# Patient Record
Sex: FEMALE | Race: BLACK OR AFRICAN AMERICAN | NOT HISPANIC OR LATINO | Employment: UNEMPLOYED | RURAL
[De-identification: names, ages, dates, MRNs, and addresses within clinical notes are randomized per-mention and may not be internally consistent; named-entity substitution may affect disease eponyms.]

---

## 2021-12-31 ENCOUNTER — HOSPITAL ENCOUNTER (INPATIENT)
Facility: HOSPITAL | Age: 41
LOS: 8 days | Discharge: ANOTHER HOSPITAL | DRG: 463 | End: 2022-01-08
Attending: FAMILY MEDICINE | Admitting: INTERNAL MEDICINE

## 2021-12-31 DIAGNOSIS — L03.90 CELLULITIS: ICD-10-CM

## 2021-12-31 DIAGNOSIS — I10 HYPERTENSION: ICD-10-CM

## 2021-12-31 DIAGNOSIS — R57.9 SHOCK: ICD-10-CM

## 2021-12-31 DIAGNOSIS — B19.20 HEPATITIS C: ICD-10-CM

## 2021-12-31 DIAGNOSIS — U07.1 COVID-19: ICD-10-CM

## 2021-12-31 DIAGNOSIS — G93.41 ENCEPHALOPATHY, METABOLIC: ICD-10-CM

## 2021-12-31 DIAGNOSIS — L02.419 CELLULITIS AND ABSCESS OF LEG: Primary | ICD-10-CM

## 2021-12-31 DIAGNOSIS — D62 ACUTE BLOOD LOSS ANEMIA: ICD-10-CM

## 2021-12-31 DIAGNOSIS — I10 PRIMARY HYPERTENSION: ICD-10-CM

## 2021-12-31 DIAGNOSIS — N17.9 AKI (ACUTE KIDNEY INJURY): ICD-10-CM

## 2021-12-31 DIAGNOSIS — L03.119 CELLULITIS AND ABSCESS OF LEG: Primary | ICD-10-CM

## 2021-12-31 DIAGNOSIS — A41.9 SEPSIS, DUE TO UNSPECIFIED ORGANISM, UNSPECIFIED WHETHER ACUTE ORGAN DYSFUNCTION PRESENT: ICD-10-CM

## 2021-12-31 DIAGNOSIS — M72.6 NECROTIZING FASCIITIS: ICD-10-CM

## 2021-12-31 LAB
ALBUMIN SERPL BCP-MCNC: 2.7 G/DL (ref 3.5–5)
ALBUMIN/GLOB SERPL: 0.7 {RATIO}
ALP SERPL-CCNC: 104 U/L (ref 37–98)
ALT SERPL W P-5'-P-CCNC: 25 U/L (ref 13–56)
AMORPH PHOS CRY #/AREA URNS LPF: ABNORMAL /LPF
ANION GAP SERPL CALCULATED.3IONS-SCNC: 21 MMOL/L (ref 7–16)
ANISOCYTOSIS BLD QL SMEAR: ABNORMAL
AST SERPL W P-5'-P-CCNC: 31 U/L (ref 15–37)
B-HCG UR QL: NEGATIVE
BACTERIA #/AREA URNS HPF: ABNORMAL /HPF
BASOPHILS # BLD AUTO: 0.01 K/UL (ref 0–0.2)
BASOPHILS NFR BLD AUTO: 0.2 % (ref 0–1)
BILIRUB SERPL-MCNC: 1.8 MG/DL (ref 0–1.2)
BILIRUB UR QL STRIP: ABNORMAL
BUN SERPL-MCNC: 18 MG/DL (ref 7–18)
BUN/CREAT SERPL: 6 (ref 6–20)
CALCIUM SERPL-MCNC: 8.6 MG/DL (ref 8.5–10.1)
CHLORIDE SERPL-SCNC: 98 MMOL/L (ref 98–107)
CLARITY UR: ABNORMAL
CO2 SERPL-SCNC: 19 MMOL/L (ref 21–32)
COLOR UR: ABNORMAL
CREAT SERPL-MCNC: 2.84 MG/DL (ref 0.55–1.02)
CRENATED CELLS: ABNORMAL
CTP QC/QA: YES
DIFFERENTIAL METHOD BLD: ABNORMAL
EOSINOPHIL # BLD AUTO: 0 K/UL (ref 0–0.5)
EOSINOPHIL NFR BLD AUTO: 0 % (ref 1–4)
ERYTHROCYTE [DISTWIDTH] IN BLOOD BY AUTOMATED COUNT: 17 % (ref 11.5–14.5)
FINE GRAN CASTS #/AREA URNS LPF: ABNORMAL /LPF
GLOBULIN SER-MCNC: 4 G/DL (ref 2–4)
GLUCOSE SERPL-MCNC: 129 MG/DL (ref 74–106)
GLUCOSE UR STRIP-MCNC: NEGATIVE MG/DL
HCT VFR BLD AUTO: 44.6 % (ref 38–47)
HGB BLD-MCNC: 14.8 G/DL (ref 12–16)
IMM GRANULOCYTES # BLD AUTO: 0.07 K/UL (ref 0–0.04)
IMM GRANULOCYTES NFR BLD: 1.2 % (ref 0–0.4)
KETONES UR STRIP-SCNC: 15 MG/DL
LACTATE SERPL-SCNC: 1.8 MMOL/L (ref 0.4–2)
LACTATE SERPL-SCNC: 3.2 MMOL/L (ref 0.4–2)
LEUKOCYTE ESTERASE UR QL STRIP: NEGATIVE
LYMPHOCYTES # BLD AUTO: 0.4 K/UL (ref 1–4.8)
LYMPHOCYTES NFR BLD AUTO: 6.8 % (ref 27–41)
LYMPHOCYTES NFR BLD MANUAL: 11 % (ref 27–41)
MCH RBC QN AUTO: 26.5 PG (ref 27–31)
MCHC RBC AUTO-ENTMCNC: 33.2 G/DL (ref 32–36)
MCV RBC AUTO: 79.8 FL (ref 80–96)
MONOCYTES # BLD AUTO: 0.81 K/UL (ref 0–0.8)
MONOCYTES NFR BLD AUTO: 13.9 % (ref 2–6)
MONOCYTES NFR BLD MANUAL: 14 % (ref 2–6)
MPC BLD CALC-MCNC: 12.7 FL (ref 9.4–12.4)
MUCOUS THREADS #/AREA URNS HPF: ABNORMAL /HPF
MYELOCYTES NFR BLD MANUAL: 1 %
NEUTROPHILS # BLD AUTO: 4.55 K/UL (ref 1.8–7.7)
NEUTROPHILS NFR BLD AUTO: 77.9 % (ref 53–65)
NEUTS BAND NFR BLD MANUAL: 32 % (ref 1–5)
NEUTS SEG NFR BLD MANUAL: 42 % (ref 50–62)
NITRITE UR QL STRIP: NEGATIVE
NRBC # BLD AUTO: 0 X10E3/UL
NRBC, AUTO (.00): 0 %
OVALOCYTES BLD QL SMEAR: ABNORMAL
PH UR STRIP: 5 PH UNITS
PLATELET # BLD AUTO: 169 K/UL (ref 150–400)
PLATELET MORPHOLOGY: ABNORMAL
POLYCHROMASIA BLD QL SMEAR: ABNORMAL
POTASSIUM SERPL-SCNC: 4 MMOL/L (ref 3.5–5.1)
PROT SERPL-MCNC: 6.7 G/DL (ref 6.4–8.2)
PROT UR QL STRIP: 100
RBC # BLD AUTO: 5.59 M/UL (ref 4.2–5.4)
RBC # UR STRIP: ABNORMAL /UL
RBC #/AREA URNS HPF: ABNORMAL /HPF
SARS-COV-2 RDRP RESP QL NAA+PROBE: POSITIVE
SODIUM SERPL-SCNC: 134 MMOL/L (ref 136–145)
SP GR UR STRIP: >=1.03
SQUAMOUS #/AREA URNS LPF: ABNORMAL /LPF
TRICHOMONAS #/AREA URNS HPF: ABNORMAL /HPF
UROBILINOGEN UR STRIP-ACNC: 4 MG/DL
WBC # BLD AUTO: 5.84 K/UL (ref 4.5–11)
WBC #/AREA URNS HPF: ABNORMAL /HPF
YEAST #/AREA URNS HPF: ABNORMAL /HPF

## 2021-12-31 PROCEDURE — 81025 URINE PREGNANCY TEST: CPT | Performed by: FAMILY MEDICINE

## 2021-12-31 PROCEDURE — 96366 THER/PROPH/DIAG IV INF ADDON: CPT

## 2021-12-31 PROCEDURE — 85025 COMPLETE CBC W/AUTO DIFF WBC: CPT | Performed by: FAMILY MEDICINE

## 2021-12-31 PROCEDURE — 63600175 PHARM REV CODE 636 W HCPCS: Performed by: HOSPITALIST

## 2021-12-31 PROCEDURE — 25000003 PHARM REV CODE 250: Performed by: FAMILY MEDICINE

## 2021-12-31 PROCEDURE — 96365 THER/PROPH/DIAG IV INF INIT: CPT

## 2021-12-31 PROCEDURE — 99222 PR INITIAL HOSPITAL CARE,LEVL II: ICD-10-PCS | Mod: ,,, | Performed by: HOSPITALIST

## 2021-12-31 PROCEDURE — 87635 SARS-COV-2 COVID-19 AMP PRB: CPT | Performed by: FAMILY MEDICINE

## 2021-12-31 PROCEDURE — 11000001 HC ACUTE MED/SURG PRIVATE ROOM

## 2021-12-31 PROCEDURE — 99285 EMERGENCY DEPT VISIT HI MDM: CPT | Mod: 25

## 2021-12-31 PROCEDURE — 25000003 PHARM REV CODE 250: Performed by: HOSPITALIST

## 2021-12-31 PROCEDURE — 81001 URINALYSIS AUTO W/SCOPE: CPT | Performed by: FAMILY MEDICINE

## 2021-12-31 PROCEDURE — 99284 PR EMERGENCY DEPT VISIT,LEVEL IV: ICD-10-PCS | Mod: ,,, | Performed by: FAMILY MEDICINE

## 2021-12-31 PROCEDURE — 80053 COMPREHEN METABOLIC PANEL: CPT | Performed by: FAMILY MEDICINE

## 2021-12-31 PROCEDURE — 63600175 PHARM REV CODE 636 W HCPCS: Performed by: FAMILY MEDICINE

## 2021-12-31 PROCEDURE — 87040 BLOOD CULTURE FOR BACTERIA: CPT | Performed by: FAMILY MEDICINE

## 2021-12-31 PROCEDURE — 81003 URINALYSIS AUTO W/O SCOPE: CPT | Performed by: FAMILY MEDICINE

## 2021-12-31 PROCEDURE — 96361 HYDRATE IV INFUSION ADD-ON: CPT

## 2021-12-31 PROCEDURE — 36415 COLL VENOUS BLD VENIPUNCTURE: CPT | Performed by: FAMILY MEDICINE

## 2021-12-31 PROCEDURE — 99284 EMERGENCY DEPT VISIT MOD MDM: CPT | Mod: ,,, | Performed by: FAMILY MEDICINE

## 2021-12-31 PROCEDURE — 83605 ASSAY OF LACTIC ACID: CPT | Performed by: FAMILY MEDICINE

## 2021-12-31 PROCEDURE — 99222 1ST HOSP IP/OBS MODERATE 55: CPT | Mod: ,,, | Performed by: HOSPITALIST

## 2021-12-31 RX ORDER — TRAZODONE HYDROCHLORIDE 50 MG/1
50 TABLET ORAL NIGHTLY PRN
Status: DISCONTINUED | OUTPATIENT
Start: 2021-12-31 | End: 2022-01-03

## 2021-12-31 RX ORDER — BISACODYL 5 MG
10 TABLET, DELAYED RELEASE (ENTERIC COATED) ORAL DAILY PRN
Status: DISCONTINUED | OUTPATIENT
Start: 2021-12-31 | End: 2022-01-08 | Stop reason: HOSPADM

## 2021-12-31 RX ORDER — ONDANSETRON 2 MG/ML
8 INJECTION INTRAMUSCULAR; INTRAVENOUS EVERY 6 HOURS PRN
Status: DISCONTINUED | OUTPATIENT
Start: 2021-12-31 | End: 2022-01-08 | Stop reason: HOSPADM

## 2021-12-31 RX ORDER — ENOXAPARIN SODIUM 100 MG/ML
40 INJECTION SUBCUTANEOUS EVERY 24 HOURS
Status: DISCONTINUED | OUTPATIENT
Start: 2021-12-31 | End: 2022-01-01

## 2021-12-31 RX ORDER — ONDANSETRON 2 MG/ML
4 INJECTION INTRAMUSCULAR; INTRAVENOUS
Status: COMPLETED | OUTPATIENT
Start: 2021-12-31 | End: 2021-12-31

## 2021-12-31 RX ORDER — OXYCODONE HYDROCHLORIDE 5 MG/1
5 TABLET ORAL EVERY 4 HOURS PRN
Status: DISCONTINUED | OUTPATIENT
Start: 2021-12-31 | End: 2022-01-08 | Stop reason: HOSPADM

## 2021-12-31 RX ORDER — SIMETHICONE 80 MG
1 TABLET,CHEWABLE ORAL 3 TIMES DAILY PRN
Status: DISCONTINUED | OUTPATIENT
Start: 2021-12-31 | End: 2022-01-08 | Stop reason: HOSPADM

## 2021-12-31 RX ORDER — MORPHINE SULFATE 4 MG/ML
4 INJECTION, SOLUTION INTRAMUSCULAR; INTRAVENOUS
Status: COMPLETED | OUTPATIENT
Start: 2021-12-31 | End: 2021-12-31

## 2021-12-31 RX ORDER — SODIUM CHLORIDE 9 MG/ML
INJECTION, SOLUTION INTRAVENOUS CONTINUOUS
Status: DISCONTINUED | OUTPATIENT
Start: 2021-12-31 | End: 2022-01-02

## 2021-12-31 RX ORDER — ACETAMINOPHEN 500 MG
1000 TABLET ORAL EVERY 6 HOURS PRN
Status: DISCONTINUED | OUTPATIENT
Start: 2021-12-31 | End: 2022-01-06

## 2021-12-31 RX ORDER — GUAIFENESIN/DEXTROMETHORPHAN 100-10MG/5
10 SYRUP ORAL EVERY 6 HOURS PRN
Status: DISCONTINUED | OUTPATIENT
Start: 2021-12-31 | End: 2022-01-08 | Stop reason: HOSPADM

## 2021-12-31 RX ORDER — SODIUM CHLORIDE 9 MG/ML
INJECTION, SOLUTION INTRAVENOUS
Status: COMPLETED | OUTPATIENT
Start: 2021-12-31 | End: 2021-12-31

## 2021-12-31 RX ORDER — HYDRALAZINE HYDROCHLORIDE 20 MG/ML
10 INJECTION INTRAMUSCULAR; INTRAVENOUS
Status: COMPLETED | OUTPATIENT
Start: 2021-12-31 | End: 2021-12-31

## 2021-12-31 RX ADMIN — ENOXAPARIN SODIUM 40 MG: 40 INJECTION SUBCUTANEOUS at 10:12

## 2021-12-31 RX ADMIN — PIPERACILLIN AND TAZOBACTAM 4.5 G: 4; .5 INJECTION, POWDER, LYOPHILIZED, FOR SOLUTION INTRAVENOUS; PARENTERAL at 10:12

## 2021-12-31 RX ADMIN — MORPHINE SULFATE 4 MG: 4 INJECTION INTRAVENOUS at 07:12

## 2021-12-31 RX ADMIN — SODIUM CHLORIDE 1000 ML/HR: 9 INJECTION, SOLUTION INTRAVENOUS at 07:12

## 2021-12-31 RX ADMIN — OXYCODONE HYDROCHLORIDE 5 MG: 5 TABLET ORAL at 10:12

## 2021-12-31 RX ADMIN — VANCOMYCIN HYDROCHLORIDE 1500 MG: 5 INJECTION, POWDER, LYOPHILIZED, FOR SOLUTION INTRAVENOUS at 07:12

## 2021-12-31 RX ADMIN — ONDANSETRON 4 MG: 2 INJECTION INTRAMUSCULAR; INTRAVENOUS at 07:12

## 2021-12-31 RX ADMIN — SODIUM CHLORIDE 100 ML/HR: 9 INJECTION, SOLUTION INTRAVENOUS at 10:12

## 2021-12-31 RX ADMIN — HYDRALAZINE HYDROCHLORIDE 10 MG: 20 INJECTION INTRAMUSCULAR; INTRAVENOUS at 09:12

## 2021-12-31 NOTE — ED TRIAGE NOTES
Presents to ED c/o pain, swelling, redness and warmth noted to left groin that has been ongoing x3-4 days.

## 2022-01-01 ENCOUNTER — ANESTHESIA (OUTPATIENT)
Dept: SURGERY | Facility: HOSPITAL | Age: 42
DRG: 463 | End: 2022-01-01

## 2022-01-01 ENCOUNTER — ANESTHESIA EVENT (OUTPATIENT)
Dept: SURGERY | Facility: HOSPITAL | Age: 42
DRG: 463 | End: 2022-01-01

## 2022-01-01 PROBLEM — L03.90 CELLULITIS: Status: ACTIVE | Noted: 2022-01-01

## 2022-01-01 PROBLEM — U07.1 COVID-19: Status: ACTIVE | Noted: 2022-01-01

## 2022-01-01 LAB
ANION GAP SERPL CALCULATED.3IONS-SCNC: 21 MMOL/L (ref 7–16)
ANISOCYTOSIS BLD QL SMEAR: ABNORMAL
APTT PPP: 38.8 SECONDS (ref 25.2–37.3)
BASOPHILS # BLD AUTO: 0.05 K/UL (ref 0–0.2)
BASOPHILS NFR BLD AUTO: 0.7 % (ref 0–1)
BUN SERPL-MCNC: 28 MG/DL (ref 7–18)
BUN/CREAT SERPL: 8 (ref 6–20)
CALCIUM SERPL-MCNC: 8 MG/DL (ref 8.5–10.1)
CHLORIDE SERPL-SCNC: 96 MMOL/L (ref 98–107)
CO2 SERPL-SCNC: 16 MMOL/L (ref 21–32)
CREAT SERPL-MCNC: 3.59 MG/DL (ref 0.55–1.02)
CRENATED CELLS: ABNORMAL
CRP SERPL-MCNC: 38.2 MG/DL (ref 0–0.8)
DIFFERENTIAL METHOD BLD: ABNORMAL
EOSINOPHIL # BLD AUTO: 0 K/UL (ref 0–0.5)
EOSINOPHIL NFR BLD AUTO: 0 % (ref 1–4)
ERYTHROCYTE [DISTWIDTH] IN BLOOD BY AUTOMATED COUNT: 16.3 % (ref 11.5–14.5)
ERYTHROCYTE [SEDIMENTATION RATE] IN BLOOD BY WESTERGREN METHOD: 16 MM/HR (ref 0–20)
EST. AVERAGE GLUCOSE BLD GHB EST-MCNC: 87 MG/DL
GLUCOSE SERPL-MCNC: 125 MG/DL (ref 74–106)
HBA1C MFR BLD HPLC: 5.2 % (ref 4.5–6.6)
HCT VFR BLD AUTO: 44.1 % (ref 38–47)
HGB BLD-MCNC: 15.3 G/DL (ref 12–16)
IMM GRANULOCYTES # BLD AUTO: 0.07 K/UL (ref 0–0.04)
IMM GRANULOCYTES NFR BLD: 1 % (ref 0–0.4)
INR BLD: 1.13 (ref 0.9–1.1)
LYMPHOCYTES # BLD AUTO: 0.57 K/UL (ref 1–4.8)
LYMPHOCYTES NFR BLD AUTO: 7.8 % (ref 27–41)
LYMPHOCYTES NFR BLD MANUAL: 12 % (ref 27–41)
MCH RBC QN AUTO: 26.6 PG (ref 27–31)
MCHC RBC AUTO-ENTMCNC: 34.7 G/DL (ref 32–36)
MCV RBC AUTO: 76.6 FL (ref 80–96)
METAMYELOCYTES NFR BLD MANUAL: 3 %
MONOCYTES # BLD AUTO: 1.04 K/UL (ref 0–0.8)
MONOCYTES NFR BLD AUTO: 14.2 % (ref 2–6)
MONOCYTES NFR BLD MANUAL: 14 % (ref 2–6)
MPC BLD CALC-MCNC: ABNORMAL G/DL
MYELOCYTES NFR BLD MANUAL: 1 %
NEUTROPHILS # BLD AUTO: 5.61 K/UL (ref 1.8–7.7)
NEUTROPHILS NFR BLD AUTO: 76.3 % (ref 53–65)
NEUTS BAND NFR BLD MANUAL: 52 % (ref 1–5)
NEUTS SEG NFR BLD MANUAL: 18 % (ref 50–62)
NRBC # BLD AUTO: 0 X10E3/UL
NRBC, AUTO (.00): 0 %
PLATELET # BLD AUTO: 148 K/UL (ref 150–400)
PLATELET MORPHOLOGY: ABNORMAL
POLYCHROMASIA BLD QL SMEAR: ABNORMAL
POTASSIUM SERPL-SCNC: 4.1 MMOL/L (ref 3.5–5.1)
PROTHROMBIN TIME: 14.5 SECONDS (ref 11.7–14.7)
RBC # BLD AUTO: 5.76 M/UL (ref 4.2–5.4)
SODIUM SERPL-SCNC: 129 MMOL/L (ref 136–145)
TARGETS BLD QL SMEAR: ABNORMAL
WBC # BLD AUTO: 7.34 K/UL (ref 4.5–11)

## 2022-01-01 PROCEDURE — 63600175 PHARM REV CODE 636 W HCPCS: Performed by: STUDENT IN AN ORGANIZED HEALTH CARE EDUCATION/TRAINING PROGRAM

## 2022-01-01 PROCEDURE — 36000707: Performed by: STUDENT IN AN ORGANIZED HEALTH CARE EDUCATION/TRAINING PROGRAM

## 2022-01-01 PROCEDURE — 27000177 HC AIRWAY, LARYNGEAL MASK: Performed by: ANESTHESIOLOGY

## 2022-01-01 PROCEDURE — 83036 HEMOGLOBIN GLYCOSYLATED A1C: CPT | Performed by: INTERNAL MEDICINE

## 2022-01-01 PROCEDURE — 71000033 HC RECOVERY, INTIAL HOUR: Performed by: STUDENT IN AN ORGANIZED HEALTH CARE EDUCATION/TRAINING PROGRAM

## 2022-01-01 PROCEDURE — M0245 HC IV INFUSION, BAMLANIVIMAB/ETESEVIMAB, INCL POST ADMIN MONIT: HCPCS | Performed by: STUDENT IN AN ORGANIZED HEALTH CARE EDUCATION/TRAINING PROGRAM

## 2022-01-01 PROCEDURE — 63600175 PHARM REV CODE 636 W HCPCS: Performed by: NURSE ANESTHETIST, CERTIFIED REGISTERED

## 2022-01-01 PROCEDURE — 27000676 HC TUBING PRIMARY PLUMSET: Performed by: ANESTHESIOLOGY

## 2022-01-01 PROCEDURE — 25000003 PHARM REV CODE 250: Performed by: STUDENT IN AN ORGANIZED HEALTH CARE EDUCATION/TRAINING PROGRAM

## 2022-01-01 PROCEDURE — 36000706: Performed by: STUDENT IN AN ORGANIZED HEALTH CARE EDUCATION/TRAINING PROGRAM

## 2022-01-01 PROCEDURE — 85730 THROMBOPLASTIN TIME PARTIAL: CPT | Performed by: HOSPITALIST

## 2022-01-01 PROCEDURE — 87075 CULTR BACTERIA EXCEPT BLOOD: CPT | Performed by: STUDENT IN AN ORGANIZED HEALTH CARE EDUCATION/TRAINING PROGRAM

## 2022-01-01 PROCEDURE — 25000003 PHARM REV CODE 250: Performed by: HOSPITALIST

## 2022-01-01 PROCEDURE — D9220A PRA ANESTHESIA: Mod: ,,, | Performed by: ANESTHESIOLOGY

## 2022-01-01 PROCEDURE — 25000003 PHARM REV CODE 250: Performed by: INTERNAL MEDICINE

## 2022-01-01 PROCEDURE — 11004 DBRDMT SKIN XTRNL GENT&PER: CPT | Mod: ,,, | Performed by: STUDENT IN AN ORGANIZED HEALTH CARE EDUCATION/TRAINING PROGRAM

## 2022-01-01 PROCEDURE — 85610 PROTHROMBIN TIME: CPT | Performed by: HOSPITALIST

## 2022-01-01 PROCEDURE — 25000003 PHARM REV CODE 250: Performed by: NURSE ANESTHETIST, CERTIFIED REGISTERED

## 2022-01-01 PROCEDURE — 87070 CULTURE OTHR SPECIMN AEROBIC: CPT | Performed by: INTERNAL MEDICINE

## 2022-01-01 PROCEDURE — 27000510 HC BLANKET BAIR HUGGER ANY SIZE: Performed by: ANESTHESIOLOGY

## 2022-01-01 PROCEDURE — 87070 CULTURE OTHR SPECIMN AEROBIC: CPT | Performed by: STUDENT IN AN ORGANIZED HEALTH CARE EDUCATION/TRAINING PROGRAM

## 2022-01-01 PROCEDURE — 86140 C-REACTIVE PROTEIN: CPT | Performed by: HOSPITALIST

## 2022-01-01 PROCEDURE — C1729 CATH, DRAINAGE: HCPCS | Performed by: STUDENT IN AN ORGANIZED HEALTH CARE EDUCATION/TRAINING PROGRAM

## 2022-01-01 PROCEDURE — 27000260 *HC AIRWAY ORAL: Performed by: ANESTHESIOLOGY

## 2022-01-01 PROCEDURE — 27201423 OPTIME MED/SURG SUP & DEVICES STERILE SUPPLY: Performed by: STUDENT IN AN ORGANIZED HEALTH CARE EDUCATION/TRAINING PROGRAM

## 2022-01-01 PROCEDURE — 11000001 HC ACUTE MED/SURG PRIVATE ROOM

## 2022-01-01 PROCEDURE — 27000716 HC OXISENSOR PROBE, ANY SIZE: Performed by: ANESTHESIOLOGY

## 2022-01-01 PROCEDURE — 63600175 PHARM REV CODE 636 W HCPCS: Performed by: HOSPITALIST

## 2022-01-01 PROCEDURE — 99233 SBSQ HOSP IP/OBS HIGH 50: CPT | Mod: ,,, | Performed by: INTERNAL MEDICINE

## 2022-01-01 PROCEDURE — 27000655: Performed by: ANESTHESIOLOGY

## 2022-01-01 PROCEDURE — 80048 BASIC METABOLIC PNL TOTAL CA: CPT | Performed by: HOSPITALIST

## 2022-01-01 PROCEDURE — 85025 COMPLETE CBC W/AUTO DIFF WBC: CPT | Performed by: HOSPITALIST

## 2022-01-01 PROCEDURE — 37000009 HC ANESTHESIA EA ADD 15 MINS: Performed by: STUDENT IN AN ORGANIZED HEALTH CARE EDUCATION/TRAINING PROGRAM

## 2022-01-01 PROCEDURE — 36415 COLL VENOUS BLD VENIPUNCTURE: CPT | Performed by: HOSPITALIST

## 2022-01-01 PROCEDURE — D9220A PRA ANESTHESIA: ICD-10-PCS | Mod: ,,, | Performed by: ANESTHESIOLOGY

## 2022-01-01 PROCEDURE — 88304 SURGICAL PATHOLOGY: ICD-10-PCS | Mod: 26,XU,, | Performed by: PATHOLOGY

## 2022-01-01 PROCEDURE — 99233 PR SUBSEQUENT HOSPITAL CARE,LEVL III: ICD-10-PCS | Mod: ,,, | Performed by: INTERNAL MEDICINE

## 2022-01-01 PROCEDURE — 85651 RBC SED RATE NONAUTOMATED: CPT | Performed by: HOSPITALIST

## 2022-01-01 PROCEDURE — 37000008 HC ANESTHESIA 1ST 15 MINUTES: Performed by: STUDENT IN AN ORGANIZED HEALTH CARE EDUCATION/TRAINING PROGRAM

## 2022-01-01 PROCEDURE — 88304 TISSUE EXAM BY PATHOLOGIST: CPT | Mod: 26,XU,, | Performed by: PATHOLOGY

## 2022-01-01 PROCEDURE — 88304 TISSUE EXAM BY PATHOLOGIST: CPT | Mod: SUR | Performed by: STUDENT IN AN ORGANIZED HEALTH CARE EDUCATION/TRAINING PROGRAM

## 2022-01-01 PROCEDURE — 11004 PR DEBRIDE NECROTIC SKIN/ TISSUE, GENIT & PERINM: ICD-10-PCS | Mod: ,,, | Performed by: STUDENT IN AN ORGANIZED HEALTH CARE EDUCATION/TRAINING PROGRAM

## 2022-01-01 RX ORDER — DIPHENHYDRAMINE HYDROCHLORIDE 50 MG/ML
25 INJECTION INTRAMUSCULAR; INTRAVENOUS EVERY 6 HOURS PRN
Status: DISCONTINUED | OUTPATIENT
Start: 2022-01-01 | End: 2022-01-01 | Stop reason: HOSPADM

## 2022-01-01 RX ORDER — HYDROMORPHONE HYDROCHLORIDE 2 MG/ML
2 INJECTION, SOLUTION INTRAMUSCULAR; INTRAVENOUS; SUBCUTANEOUS EVERY 6 HOURS PRN
Status: DISCONTINUED | OUTPATIENT
Start: 2022-01-01 | End: 2022-01-01

## 2022-01-01 RX ORDER — NALOXONE HCL 0.4 MG/ML
0.02 VIAL (ML) INJECTION
Status: DISCONTINUED | OUTPATIENT
Start: 2022-01-01 | End: 2022-01-04 | Stop reason: SDUPTHER

## 2022-01-01 RX ORDER — CLINDAMYCIN PHOSPHATE 900 MG/50ML
900 INJECTION, SOLUTION INTRAVENOUS
Status: DISCONTINUED | OUTPATIENT
Start: 2022-01-01 | End: 2022-01-04

## 2022-01-01 RX ORDER — CHOLECALCIFEROL (VITAMIN D3) 25 MCG
1000 TABLET ORAL DAILY
Status: DISCONTINUED | OUTPATIENT
Start: 2022-01-01 | End: 2022-01-03

## 2022-01-01 RX ORDER — ZINC SULFATE 50(220)MG
220 CAPSULE ORAL DAILY
Status: COMPLETED | OUTPATIENT
Start: 2022-01-01 | End: 2022-01-03

## 2022-01-01 RX ORDER — MEPERIDINE HYDROCHLORIDE 25 MG/ML
25 INJECTION INTRAMUSCULAR; INTRAVENOUS; SUBCUTANEOUS EVERY 10 MIN PRN
Status: DISCONTINUED | OUTPATIENT
Start: 2022-01-01 | End: 2022-01-01 | Stop reason: HOSPADM

## 2022-01-01 RX ORDER — ASCORBIC ACID 500 MG
500 TABLET ORAL DAILY
Status: DISCONTINUED | OUTPATIENT
Start: 2022-01-01 | End: 2022-01-03

## 2022-01-01 RX ORDER — HYDROMORPHONE HYDROCHLORIDE 2 MG/ML
INJECTION, SOLUTION INTRAMUSCULAR; INTRAVENOUS; SUBCUTANEOUS
Status: DISCONTINUED | OUTPATIENT
Start: 2022-01-01 | End: 2022-01-01

## 2022-01-01 RX ORDER — ONDANSETRON 2 MG/ML
4 INJECTION INTRAMUSCULAR; INTRAVENOUS DAILY PRN
Status: DISCONTINUED | OUTPATIENT
Start: 2022-01-01 | End: 2022-01-01 | Stop reason: HOSPADM

## 2022-01-01 RX ORDER — FENTANYL CITRATE 50 UG/ML
INJECTION, SOLUTION INTRAMUSCULAR; INTRAVENOUS
Status: DISCONTINUED | OUTPATIENT
Start: 2022-01-01 | End: 2022-01-01

## 2022-01-01 RX ORDER — MORPHINE SULFATE 10 MG/ML
4 INJECTION INTRAMUSCULAR; INTRAVENOUS; SUBCUTANEOUS EVERY 5 MIN PRN
Status: DISCONTINUED | OUTPATIENT
Start: 2022-01-01 | End: 2022-01-01 | Stop reason: HOSPADM

## 2022-01-01 RX ORDER — LIDOCAINE HYDROCHLORIDE 20 MG/ML
INJECTION INTRAVENOUS
Status: DISCONTINUED | OUTPATIENT
Start: 2022-01-01 | End: 2022-01-01

## 2022-01-01 RX ORDER — DEXAMETHASONE SODIUM PHOSPHATE 4 MG/ML
6 INJECTION, SOLUTION INTRA-ARTICULAR; INTRALESIONAL; INTRAMUSCULAR; INTRAVENOUS; SOFT TISSUE EVERY 24 HOURS
Status: DISCONTINUED | OUTPATIENT
Start: 2022-01-01 | End: 2022-01-01

## 2022-01-01 RX ORDER — ONDANSETRON 2 MG/ML
4 INJECTION INTRAMUSCULAR; INTRAVENOUS EVERY 6 HOURS PRN
Status: DISCONTINUED | OUTPATIENT
Start: 2022-01-01 | End: 2022-01-08 | Stop reason: HOSPADM

## 2022-01-01 RX ORDER — ONDANSETRON 2 MG/ML
INJECTION INTRAMUSCULAR; INTRAVENOUS
Status: DISCONTINUED | OUTPATIENT
Start: 2022-01-01 | End: 2022-01-01

## 2022-01-01 RX ORDER — FAMOTIDINE 20 MG/1
20 TABLET, FILM COATED ORAL 2 TIMES DAILY
Status: DISCONTINUED | OUTPATIENT
Start: 2022-01-01 | End: 2022-01-04

## 2022-01-01 RX ORDER — DEXAMETHASONE SODIUM PHOSPHATE 4 MG/ML
4 INJECTION, SOLUTION INTRA-ARTICULAR; INTRALESIONAL; INTRAMUSCULAR; INTRAVENOUS; SOFT TISSUE EVERY 24 HOURS
Status: DISCONTINUED | OUTPATIENT
Start: 2022-01-02 | End: 2022-01-02

## 2022-01-01 RX ORDER — SODIUM CHLORIDE 9 MG/ML
INJECTION, SOLUTION INTRAVENOUS CONTINUOUS
Status: CANCELLED | OUTPATIENT
Start: 2022-01-01

## 2022-01-01 RX ORDER — HYDROMORPHONE HYDROCHLORIDE 2 MG/ML
0.5 INJECTION, SOLUTION INTRAMUSCULAR; INTRAVENOUS; SUBCUTANEOUS EVERY 5 MIN PRN
Status: DISCONTINUED | OUTPATIENT
Start: 2022-01-01 | End: 2022-01-01 | Stop reason: HOSPADM

## 2022-01-01 RX ORDER — PROPOFOL 10 MG/ML
VIAL (ML) INTRAVENOUS
Status: DISCONTINUED | OUTPATIENT
Start: 2022-01-01 | End: 2022-01-01

## 2022-01-01 RX ORDER — PROCHLORPERAZINE EDISYLATE 5 MG/ML
5 INJECTION INTRAMUSCULAR; INTRAVENOUS EVERY 6 HOURS PRN
Status: DISCONTINUED | OUTPATIENT
Start: 2022-01-01 | End: 2022-01-08 | Stop reason: HOSPADM

## 2022-01-01 RX ORDER — HYDROMORPHONE HCL IN 0.9% NACL 6 MG/30 ML
PATIENT CONTROLLED ANALGESIA SYRINGE INTRAVENOUS CONTINUOUS
Status: DISCONTINUED | OUTPATIENT
Start: 2022-01-01 | End: 2022-01-03

## 2022-01-01 RX ORDER — HEPARIN SODIUM 5000 [USP'U]/ML
5000 INJECTION, SOLUTION INTRAVENOUS; SUBCUTANEOUS EVERY 8 HOURS
Status: DISCONTINUED | OUTPATIENT
Start: 2022-01-01 | End: 2022-01-04

## 2022-01-01 RX ADMIN — PROPOFOL 150 MG: 10 INJECTION, EMULSION INTRAVENOUS at 10:01

## 2022-01-01 RX ADMIN — Medication 1000 UNITS: at 01:01

## 2022-01-01 RX ADMIN — OXYCODONE HYDROCHLORIDE 5 MG: 5 TABLET ORAL at 05:01

## 2022-01-01 RX ADMIN — FAMOTIDINE 20 MG: 20 TABLET, FILM COATED ORAL at 09:01

## 2022-01-01 RX ADMIN — ONDANSETRON 4 MG: 2 INJECTION INTRAMUSCULAR; INTRAVENOUS at 11:01

## 2022-01-01 RX ADMIN — HYDROMORPHONE HYDROCHLORIDE 0.2 MG: 2 INJECTION, SOLUTION INTRAMUSCULAR; INTRAVENOUS; SUBCUTANEOUS at 11:01

## 2022-01-01 RX ADMIN — LIDOCAINE HYDROCHLORIDE 50 MG: 20 INJECTION, SOLUTION INTRAVENOUS at 10:01

## 2022-01-01 RX ADMIN — CLINDAMYCIN IN 5 PERCENT DEXTROSE 900 MG: 18 INJECTION, SOLUTION INTRAVENOUS at 09:01

## 2022-01-01 RX ADMIN — HYDROMORPHONE HYDROCHLORIDE 0.4 MG: 2 INJECTION, SOLUTION INTRAMUSCULAR; INTRAVENOUS; SUBCUTANEOUS at 11:01

## 2022-01-01 RX ADMIN — HEPARIN SODIUM 5000 UNITS: 5000 INJECTION INTRAVENOUS; SUBCUTANEOUS at 01:01

## 2022-01-01 RX ADMIN — FENTANYL CITRATE 100 MCG: 50 INJECTION INTRAMUSCULAR; INTRAVENOUS at 10:01

## 2022-01-01 RX ADMIN — HYDROMORPHONE HYDROCHLORIDE 2 MG: 2 INJECTION INTRAMUSCULAR; INTRAVENOUS; SUBCUTANEOUS at 01:01

## 2022-01-01 RX ADMIN — PIPERACILLIN AND TAZOBACTAM 4.5 G: 4; .5 INJECTION, POWDER, FOR SOLUTION INTRAVENOUS at 01:01

## 2022-01-01 RX ADMIN — SODIUM CHLORIDE, POTASSIUM CHLORIDE, SODIUM LACTATE AND CALCIUM CHLORIDE: 600; 310; 30; 20 INJECTION, SOLUTION INTRAVENOUS at 12:01

## 2022-01-01 RX ADMIN — ZINC SULFATE 220 MG (50 MG) CAPSULE 220 MG: CAPSULE at 01:01

## 2022-01-01 RX ADMIN — SODIUM CHLORIDE 700 MG: 9 INJECTION, SOLUTION INTRAVENOUS at 05:01

## 2022-01-01 RX ADMIN — HEPARIN SODIUM 5000 UNITS: 5000 INJECTION INTRAVENOUS; SUBCUTANEOUS at 09:01

## 2022-01-01 RX ADMIN — OXYCODONE HYDROCHLORIDE 5 MG: 5 TABLET ORAL at 09:01

## 2022-01-01 RX ADMIN — FAMOTIDINE 20 MG: 20 TABLET, FILM COATED ORAL at 08:01

## 2022-01-01 RX ADMIN — SODIUM CHLORIDE, POTASSIUM CHLORIDE, SODIUM LACTATE AND CALCIUM CHLORIDE: 600; 310; 30; 20 INJECTION, SOLUTION INTRAVENOUS at 10:01

## 2022-01-01 RX ADMIN — OXYCODONE HYDROCHLORIDE AND ACETAMINOPHEN 500 MG: 500 TABLET ORAL at 01:01

## 2022-01-01 RX ADMIN — Medication: at 05:01

## 2022-01-01 RX ADMIN — DEXAMETHASONE SODIUM PHOSPHATE 6 MG: 4 INJECTION, SOLUTION INTRAMUSCULAR; INTRAVENOUS at 09:01

## 2022-01-01 RX ADMIN — HYDROMORPHONE HYDROCHLORIDE 0.4 MG: 2 INJECTION, SOLUTION INTRAMUSCULAR; INTRAVENOUS; SUBCUTANEOUS at 12:01

## 2022-01-01 RX ADMIN — PIPERACILLIN AND TAZOBACTAM 4.5 G: 4; .5 INJECTION, POWDER, FOR SOLUTION INTRAVENOUS at 08:01

## 2022-01-01 RX ADMIN — CLINDAMYCIN IN 5 PERCENT DEXTROSE 900 MG: 18 INJECTION, SOLUTION INTRAVENOUS at 07:01

## 2022-01-01 NOTE — ED PROVIDER NOTES
Encounter Date: 12/31/2021    SCRIBE #1 NOTE: I, Lorna Azar, am scribing for, and in the presence of,  Katia Jarquin. I have scribed the entire note.       History     Chief Complaint   Patient presents with    Groin Swelling    Groin Pain     The patient is a 41 year old female with complaints of swelling and pain of the groin region. She states the issue began 3 days ago and has progressively gotten worse. She reports the swelling and pain is in the groin extending into the left labia, down to the upper part of the left thigh, and upwards towards the anterior abdomen. She also reports having chills with the other symptoms. She denies any dysuria, hematuria, fever, nausea, vomiting or diarrhea. The patient has a history of hypertension but is not compliant with her medication because she can not afford it. She is currently on her menstrual cycle.    The history is provided by the patient. No  was used.     Review of patient's allergies indicates:  No Known Allergies  Past Medical History:   Diagnosis Date    Hypertension      History reviewed. No pertinent surgical history.  History reviewed. No pertinent family history.  Social History     Tobacco Use    Smoking status: Current Some Day Smoker    Smokeless tobacco: Never Used   Substance Use Topics    Alcohol use: Not Currently    Drug use: Never     Review of Systems   Constitutional: Positive for chills. Negative for fever.   Gastrointestinal: Positive for abdominal pain. Negative for diarrhea, nausea and vomiting.   Genitourinary: Negative for dysuria and hematuria.        Swelling and pain of the genital and groin area   All other systems reviewed and are negative.      Physical Exam     Initial Vitals [12/31/21 1705]   BP Pulse Resp Temp SpO2   (!) 171/118 (!) 130 20 99.1 °F (37.3 °C) 99 %      MAP       --         Physical Exam    Constitutional: She appears well-developed and well-nourished.   HENT:   Head: Normocephalic.    Eyes: Conjunctivae and EOM are normal. Pupils are equal, round, and reactive to light.   Neck: Neck supple.   Normal range of motion.  Cardiovascular: Tachycardia present.    Pulmonary/Chest: She has wheezes (Mild expiratory wheezes).   Abdominal: Abdomen is soft. There is abdominal tenderness (LLQ has some induration and tenderness).   Genitourinary:    Genitourinary Comments: Tenderness, induration, and redness over the groin bilaterally     Musculoskeletal:         General: Normal range of motion.      Cervical back: Normal range of motion and neck supple.     Neurological: She is alert and oriented to person, place, and time.   Skin: Skin is warm and dry.   Psychiatric: She has a normal mood and affect. Thought content normal.         Medical Screening Exam   See Full Note    ED Course   Procedures  Labs Reviewed   COMPREHENSIVE METABOLIC PANEL - Abnormal; Notable for the following components:       Result Value    Sodium 134 (*)     CO2 19 (*)     Anion Gap 21 (*)     Glucose 129 (*)     Creatinine 2.84 (*)     Albumin 2.7 (*)     Bilirubin, Total 1.8 (*)     Alk Phos 104 (*)     eGFR 19 (*)     All other components within normal limits   URINALYSIS, REFLEX TO URINE CULTURE - Abnormal; Notable for the following components:    Color, UA Orange (*)     Clarity, UA Cloudy (*)     Protein,   (*)     Ketones, UA 15  (*)     Urobilinogen, UA 4.0 (*)     Bilirubin, UA Moderate (*)     Blood, UA Trace-Intact (*)     Specific Gravity, UA >=1.030 (*)     All other components within normal limits   CBC WITH DIFFERENTIAL - Abnormal; Notable for the following components:    RBC 5.59 (*)     MCV 79.8 (*)     MCH 26.5 (*)     RDW 17.0 (*)     MPV 12.7 (*)     Neutrophils % 77.9 (*)     Lymphocytes % 6.8 (*)     Monocytes % 13.9 (*)     Eosinophils % 0.0 (*)     Immature Granulocytes % 1.2 (*)     Lymphocytes, Absolute 0.40 (*)     Monocytes, Absolute 0.81 (*)     Immature Granulocytes, Absolute 0.07 (*)     All other  components within normal limits   MANUAL DIFFERENTIAL - Abnormal; Notable for the following components:    Segmented Neutrophils, Man % 42 (*)     Bands, Man % 32 (*)     Lymphocytes, Man % 11 (*)     Monocytes, Man % 14 (*)     Platelet Morphology Few Large Platelets (*)     All other components within normal limits   LACTIC ACID, PLASMA - Abnormal; Notable for the following components:    Lactic Acid 3.2 (*)     All other components within normal limits   URINALYSIS, MICROSCOPIC - Abnormal; Notable for the following components:    Fine Granular Casts, UA 0-2 (*)     Amorphous Crystals, UA Few (*)     All other components within normal limits   CULTURE, BLOOD   CULTURE, BLOOD   CBC W/ AUTO DIFFERENTIAL    Narrative:     The following orders were created for panel order CBC auto differential.  Procedure                               Abnormality         Status                     ---------                               -----------         ------                     CBC with Differential[[id number]]        Abnormal            Final result               Manual Differential[[id number]]          Abnormal            Final result                 Please view results for these tests on the individual orders.   LACTIC ACID, PLASMA   SARS-COV-2 RNA AMPLIFICATION, QUAL   POCT URINE PREGNANCY          Imaging Results          US Soft Tissue, Groin Left (Final result)  Result time 12/31/21 20:58:27   Procedure changed from US Pelvis Complete Non OB     Final result by Tan Diop MD (12/31/21 20:58:27)                 Impression:      Soft tissue edema in the left mons pubis area.  Potential small phlegmon or very early abscess measuring 9.5 mm in this area      Electronically signed by: Tna Diop  Date:    12/31/2021  Time:    20:58             Narrative:    EXAMINATION:  US SOFT TISSUE, GROIN LEFT    CLINICAL HISTORY:  inflammation;.  .  Pain and swelling left groin region    TECHNIQUE:  Real-time ultrasound images  are captured and archived.    COMPARISON:  None    FINDINGS:  There is soft tissue edema in the left mons pubis region.  There is a small 9.5 mm hypoechoic area in the subcutaneous fat in this region which could represent phlegmon or very early abscess.    Left common femoral artery and vein and left greater saphenous vein are patent at this level.                                 Medications   acetaminophen tablet 1,000 mg (has no administration in time range)   bisacodyL EC tablet 10 mg (has no administration in time range)   dextromethorphan-guaiFENesin  mg/5 ml liquid 10 mL (has no administration in time range)   ondansetron injection 8 mg (has no administration in time range)   simethicone chewable tablet 80 mg (has no administration in time range)   traZODone tablet 50 mg (has no administration in time range)   oxyCODONE immediate release tablet 5 mg (has no administration in time range)   enoxaparin injection 40 mg (has no administration in time range)   piperacillin-tazobactam (ZOSYN) 4.5 g in dextrose 5 % in water (D5W) 5 % 100 mL IVPB (MB+) (has no administration in time range)   0.9%  NaCl infusion (has no administration in time range)   piperacillin-tazobactam (ZOSYN) 4.5 g in dextrose 5 % in water (D5W) 5 % 100 mL IVPB (MB+) (has no administration in time range)   vancomycin (VANCOCIN) 1,750 mg in dextrose 5 % 500 mL IVPB (has no administration in time range)   vancomycin - pharmacy to dose (has no administration in time range)   morphine injection 4 mg (4 mg Intravenous Given 12/31/21 1906)   ondansetron injection 4 mg (4 mg Intravenous Given 12/31/21 1906)   vancomycin (VANCOCIN) 1,500 mg in dextrose 5 % 250 mL IVPB (1,500 mg Intravenous New Bag 12/31/21 1948)   0.9%  NaCl infusion ( Intravenous Stopped 12/31/21 1952)   hydrALAZINE injection 10 mg (10 mg Intravenous Given 12/31/21 2102)     Medical Decision Making:   ED Management:  Discussed case with Dr. Mccain who accepted patient admission  for cellulitis             Attending Attestation:           Physician Attestation for Scribe:  Physician Attestation Statement for Scribe #1: I, Katia Jarquin MD, reviewed documentation, as scribed by Lorna Azar in my presence, and it is both accurate and complete.                   Clinical Impression:   Final diagnoses:  [L03.119, L02.419] Cellulitis and abscess of leg (Primary)          ED Disposition Condition    Admit               Katia Jarquin MD  12/31/21 2121

## 2022-01-01 NOTE — PROGRESS NOTES
[facility] Medicine  Progress Note    Patient Name: Flor Kelley  MRN: [id number]  Patient Class: IP- Inpatient   Admission Date: 12/31/2021  Length of Stay: 1 days  Attending Physician: Jairo Anne MD  Primary Care Provider: Primary Doctor No        Subjective:     Principal Problem:Necrotizing fasciitis        HPI:  42 yo F presents to the ED with worsening LLE pain and swelling.  She states that she has not had any trauma to LLE but has noticed redness and swelling worsening over the past several days.  She has not had F/C but states that area has become warm and tender.  CT done in ED showed small abscess only.  Lactic acid 3.2 but repeat 1.8.  She has elevated BP and says she has not been able to afford any meds and that she has not seen a PCP since her diagnosis of HTN.  Her creat is 2.8 and is most likely chronic.  She has marked pronteinuria.      Patient has had a dry cough and has lost her appetitie in the last two days.  She has not had COVID vaccinations and she did attend a [employer] party recently and she is now COVID positive.  She is not hypoxic or SOB at present.  She is currently on her menstrual cycle and urine pregnancy is negative.        Overview/Hospital Course:  No notes on file    Interval History:     Pt appears to be in a lot of discomfort d/t pain, marked swelling and erythema noted in the groin and extending to her thigh. She is having trouble even moving her legs.     Discussed about ab infusion for covid, she would like to get it for now, however will have to go to surgery first since that's urgent.    She is very tachycardic, likely d/t sepsis and pain, also having Acute kidney injury, will increase her fluids.    She is low threshold transfer to icu given the extent of her infection.     Review of Systems   Constitutional: Positive for fatigue and fever. Negative for appetite change, chills and unexpected weight change.   HENT: Negative for  congestion, mouth sores, nosebleeds, sinus pain, sore throat and trouble swallowing.    Eyes: Negative for visual disturbance.   Respiratory: Positive for cough. Negative for apnea, chest tightness and shortness of breath.    Cardiovascular: Negative for chest pain, palpitations and leg swelling.   Gastrointestinal: Negative for abdominal pain, blood in stool, constipation, diarrhea, nausea and vomiting.   Endocrine: Negative for polydipsia and polyuria.   Genitourinary: Negative for decreased urine volume, difficulty urinating, dysuria, frequency and hematuria.   Musculoskeletal: Negative for arthralgias, back pain and neck pain.   Skin: Positive for color change.        Abscess/cellulitis in groin and thigh.   Neurological: Negative for syncope, light-headedness and headaches.   Hematological: Does not bruise/bleed easily.   Psychiatric/Behavioral: Negative for confusion and suicidal ideas.     Objective:     Vital Signs (Most Recent):  Temp: 98.1 °F (36.7 °C) (01/01/22 0725)  Pulse: (!) 120 (01/01/22 0725)  Resp: (!) 22 (01/01/22 0907)  BP: (!) 134/104 (01/01/22 0725)  SpO2: (!) 94 % (01/01/22 0725) Vital Signs (24h Range):  Temp:  [97.4 °F (36.3 °C)-99.1 °F (37.3 °C)] 98.1 °F (36.7 °C)  Pulse:  [119-133] 120  Resp:  [18-22] 22  SpO2:  [94 %-99 %] 94 %  BP: (118-180)/() 134/104     Weight: 82.4 kg (181 lb 9.6 oz)  Body mass index is 35.47 kg/m².    Intake/Output Summary (Last 24 hours) at 1/1/2022 1201  Last data filed at 1/1/2022 1137  Gross per 24 hour   Intake 1850 ml   Output --   Net 1850 ml      Physical Exam    Significant Labs: All pertinent labs within the past 24 hours have been reviewed.    Significant Imaging: I have reviewed all pertinent imaging results/findings within the past 24 hours.      Assessment/Plan:      * Necrotizing fasciitis    OR 1/1/22    Vanc, zosyn and clindamycin      Hypertension  Hold any therapy d/t sepsis      COVID-19  Steriods,Ppx anticoagulation and H2  blockers  Monitor for oxygen requirement      Pharmacy consulted for monoclonal ab.      Cellulitis  Continue IV zosyn and IV vancomycin, added clindamycin by Dr Murrieta  Follow blood cultures  Being taken to the OR 1/1/22  Has developed necrotizing fascitis most likely, says she kept delaying in seeking medical attention until it got unbearable.           VTE Risk Mitigation (From admission, onward)         Ordered     heparin (porcine) injection 5,000 Units  Every 8 hours         01/01/22 0628                Discharge Planning   JOSÉ MIGUEL:      Code Status: Full Code   Is the patient medically ready for discharge?:     Reason for patient still in hospital (select all that apply): Treatment                     Rehmat PAUL Anne MD  Department of Hospital Medicine   [facility]

## 2022-01-01 NOTE — SUBJECTIVE & OBJECTIVE
No current facility-administered medications on file prior to encounter.     No current outpatient medications on file prior to encounter.       Review of patient's allergies indicates:  No Known Allergies    Past Medical History:   Diagnosis Date    Hypertension      Past Surgical History:   Procedure Laterality Date    NO PAST SURGERIES       Family History     Problem Relation (Age of Onset)    Hypertension Mother        Tobacco Use    Smoking status: Current Some Day Smoker    Smokeless tobacco: Never Used   Substance and Sexual Activity    Alcohol use: Not Currently    Drug use: Never    Sexual activity: Not Currently     Review of Systems   Constitutional: Negative.  Negative for fatigue and unexpected weight change.   HENT: Negative.  Negative for trouble swallowing.    Eyes: Negative.    Respiratory: Negative.  Negative for chest tightness and shortness of breath.    Cardiovascular: Negative.  Negative for chest pain.   Gastrointestinal: Negative.  Negative for abdominal pain, blood in stool and nausea.   Endocrine: Negative.    Genitourinary: Negative.  Negative for hematuria.   Musculoskeletal: Negative.  Negative for back pain and myalgias.        Left thigh/groin/pubic pain    Neurological: Negative.  Negative for dizziness, speech difficulty, weakness and light-headedness.   Psychiatric/Behavioral: Negative.  Negative for agitation and behavioral problems.     Objective:     Vital Signs (Most Recent):  Temp: 97.4 °F (36.3 °C) (01/01/22 0400)  Pulse: (!) 133 (01/01/22 0400)  Resp: 18 (01/01/22 0532)  BP: 118/85 (01/01/22 0400)  SpO2: 97 % (01/01/22 0400) Vital Signs (24h Range):  Temp:  [97.4 °F (36.3 °C)-99.1 °F (37.3 °C)] 97.4 °F (36.3 °C)  Pulse:  [119-133] 133  Resp:  [18-20] 18  SpO2:  [96 %-99 %] 97 %  BP: (118-180)/() 118/85     Weight: 82.4 kg (181 lb 9.6 oz)  Body mass index is 35.47 kg/m².    Physical Exam  Constitutional:       General: She is not in acute distress.      Appearance: Normal appearance.   HENT:      Head: Normocephalic and atraumatic.      Nose: Nose normal.   Eyes:      General: No scleral icterus.     Pupils: Pupils are equal, round, and reactive to light.   Cardiovascular:      Rate and Rhythm: Normal rate.   Pulmonary:      Effort: Pulmonary effort is normal. No respiratory distress.      Breath sounds: Normal breath sounds.   Abdominal:      General: There is no distension.      Palpations: Abdomen is soft.      Tenderness: There is no abdominal tenderness. There is no guarding.   Musculoskeletal:         General: Normal range of motion.      Cervical back: Normal range of motion and neck supple.        Legs:       Comments: Erythema and induration across the suprapubic area extending down the left groin and left thigh; there is necrotic skin in the left inguinal region   Skin:     General: Skin is warm.      Coloration: Skin is not jaundiced.   Neurological:      General: No focal deficit present.      Mental Status: She is alert and oriented to person, place, and time.      Cranial Nerves: No cranial nerve deficit.   Psychiatric:         Mood and Affect: Mood normal.         Significant Labs:  I have reviewed all pertinent lab results within the past 24 hours.  CBC:   Recent Labs   Lab 12/31/21  1720   WBC 5.84   RBC 5.59*   HGB 14.8   HCT 44.6      MCV 79.8*   MCH 26.5*   MCHC 33.2     BMP:   Recent Labs   Lab 01/01/22  0639   *   *   K 4.1   CL 96*   CO2 16*   BUN 28*   CREATININE 3.59*   CALCIUM 8.0*       Significant Diagnostics:  I have reviewed all pertinent imaging results/findings within the past 24 hours.

## 2022-01-01 NOTE — NURSING
Received pt from ED via stretcher. Nadn. resp even and unlabored. Safety measures intact. C/o of severe pubic pain. VSS. Voiced no complaints. Will continue to monitor.

## 2022-01-01 NOTE — OP NOTE
[facility] - Periop Services  Surgery Department  Operative Note    SUMMARY     Date of Procedure: 1/1/2022     Procedure: Procedure(s) (LRB):  DEBRIDEMENT, LEFT LOWER EXTREMITY/Groin/Pubis (Left)     Surgeon(s) and Role:     * Brandyn Murrieta, DO - Primary    Assisting Surgeon: None    Pre-Operative Diagnosis: Cellulitis and abscess of leg [L03.119, L02.419]    Post-Operative Diagnosis: Post-Op Diagnosis Codes:     * Cellulitis and abscess of leg [L03.119, L02.419]    Anesthesia: General    Operative Findings (including complications, if any):  Necrotizing soft tissue infection extending down to the fascia in the left upper medial thigh and extending to the bilateral labia    Description of Technical Procedures:  Patient was brought to the operating room placed on the operating table in the supine/lithotomy position.  Patient underwent general anesthesia per the anesthesia team.  The groin and bilateral inner thighs were then prepped and draped in usual sterile fashion.  There was necrotic skin on the left medial thigh.  We used electrocautery and circumferentially dissected this necrotic skin and extended the incision down through the subcutaneous tissue and down to the muscle.  We found necrotic fat and some disease fascia which we excised and sent to pathology.  The total length of the incision on the left medial thigh will 20 by 5 x 8 cm.  Bleeding was controlled electrocautery.  Cultures were taken and sent to microbiology.  There was a fluctuance over the left labia with some necrotic skin which measured 3 x 3 cm. We then made a 5 cm vertical elliptical incision around this necrotic skin and excised down to subcutaneous tissue.  We probed through the subcutaneous tissue and connected with a pocket of purulent drainage in the right labia extended all the way down inferiorly.  We made a counter incision with the cautery at the inferior border of the right labia and connected the tunnel.  More purulent  drainage was expressed and cultured and sent to microbiology.  We irrigated all the cavities and then packed with Kerlix, 4x4s and ABD pads.  I inserted a Steele catheter; patient was awakened, extubated and taken to the PACU in stable condition.      Estimated Blood Loss (EBL): 15 cc           Implants: * No implants in log *    Specimens:   Specimen (24h ago, onward)             Start     Ordered    01/01/22 1136  Surgical Pathology  RELEASE UPON ORDERING         01/01/22 1136                        Condition: Good    Disposition: PACU - hemodynamically stable.    Attestation: I was present and scrubbed for the entire procedure.

## 2022-01-01 NOTE — NURSING
Pt returned from via regular bed. resp even & unlabored. Pt drowsy but responds to verbal stimuli. VS obtained. Dressing to groin and left thigh intact. Pt denies needs. Safety ms ongoing. cb in reach. Wctm.

## 2022-01-01 NOTE — ANESTHESIA PROCEDURE NOTES
Intubation  Performed by: Coby Padron CRNA  Authorized by: Matt Brewer MD     Intubation:     Induction:  Intravenous    Intubated:  Postinduction    Attempts:  1    Attempted By:  CRNA    Difficult Airway Encountered?: No      Complications:  None    Airway Device:  Supraglottic airway/LMA    Airway Device Size:  4.0    Style/Cuff Inflation:  Cuffed    Placement Verified By:  Capnometry    Complicating Factors:  None    Findings Post-Intubation:  BS equal bilateral and atraumatic/condition of teeth unchanged

## 2022-01-01 NOTE — ASSESSMENT & PLAN NOTE
Will start BB.  Patient has not sees PCP since diagnosis  Renal failure most likely chronic due to HTN.  Will need to follow up as OP.

## 2022-01-01 NOTE — ASSESSMENT & PLAN NOTE
Steriods,Ppx anticoagulation and H2 blockers  Monitor for oxygen requirement      Pharmacy consulted for monoclonal ab.

## 2022-01-01 NOTE — ANESTHESIA POSTPROCEDURE EVALUATION
Anesthesia Post Evaluation    Patient: Flor Kelley    Procedure(s) Performed: Procedure(s) (LRB):  DEBRIDEMENT, LEFT LOWER EXTREMITY/Groin/Pubis (Left)    Final Anesthesia Type: general      Patient location during evaluation: PACU  Post-procedure vital signs: reviewed and stable  Pain management: adequate  Airway patency: patent    PONV status at discharge: No PONV  Anesthetic complications: no      Cardiovascular status: hemodynamically stable  Respiratory status: unassisted  Hydration status: euvolemic  Follow-up not needed.          Vitals Value Taken Time   /73 01/01/22 1500   Temp 36.7 °C (98 °F) 01/01/22 1500   Pulse 121 01/01/22 1500   Resp 22 01/01/22 1727   SpO2 94 % 01/01/22 1500         Event Time   Out of Recovery 01/01/2022 12:37:26         Pain/Angel Luis Score: Pain Rating Prior to Med Admin: 10 (1/1/2022  5:27 PM)  Pain Rating Post Med Admin: 4 (1/1/2022  6:32 AM)  Angel Luis Score: 9 (1/1/2022 12:36 PM)

## 2022-01-01 NOTE — SUBJECTIVE & OBJECTIVE
Interval History:     Pt appears to be in a lot of discomfort d/t pain, marked swelling and erythema noted in the groin and extending to her thigh. She is having trouble even moving her legs.     Discussed about ab infusion for covid, she would like to get it for now, however will have to go to surgery first since that's urgent.    She is very tachycardic, likely d/t sepsis and pain, also having Acute kidney injury, will increase her fluids.    She is low threshold transfer to icu given the extent of her infection.     Review of Systems   Constitutional: Positive for fatigue and fever. Negative for appetite change, chills and unexpected weight change.   HENT: Negative for congestion, mouth sores, nosebleeds, sinus pain, sore throat and trouble swallowing.    Eyes: Negative for visual disturbance.   Respiratory: Positive for cough. Negative for apnea, chest tightness and shortness of breath.    Cardiovascular: Negative for chest pain, palpitations and leg swelling.   Gastrointestinal: Negative for abdominal pain, blood in stool, constipation, diarrhea, nausea and vomiting.   Endocrine: Negative for polydipsia and polyuria.   Genitourinary: Negative for decreased urine volume, difficulty urinating, dysuria, frequency and hematuria.   Musculoskeletal: Negative for arthralgias, back pain and neck pain.   Skin: Positive for color change.        Abscess/cellulitis in groin and thigh.   Neurological: Negative for syncope, light-headedness and headaches.   Hematological: Does not bruise/bleed easily.   Psychiatric/Behavioral: Negative for confusion and suicidal ideas.     Objective:     Vital Signs (Most Recent):  Temp: 98.1 °F (36.7 °C) (01/01/22 0725)  Pulse: (!) 120 (01/01/22 0725)  Resp: (!) 22 (01/01/22 0907)  BP: (!) 134/104 (01/01/22 0725)  SpO2: (!) 94 % (01/01/22 0725) Vital Signs (24h Range):  Temp:  [97.4 °F (36.3 °C)-99.1 °F (37.3 °C)] 98.1 °F (36.7 °C)  Pulse:  [119-133] 120  Resp:  [18-22] 22  SpO2:  [94 %-99  %] 94 %  BP: (118-180)/() 134/104     Weight: 82.4 kg (181 lb 9.6 oz)  Body mass index is 35.47 kg/m².    Intake/Output Summary (Last 24 hours) at 1/1/2022 1201  Last data filed at 1/1/2022 1137  Gross per 24 hour   Intake 1850 ml   Output --   Net 1850 ml      Physical Exam    Significant Labs: All pertinent labs within the past 24 hours have been reviewed.    Significant Imaging: I have reviewed all pertinent imaging results/findings within the past 24 hours.

## 2022-01-01 NOTE — PLAN OF CARE
Problem: Adult Inpatient Plan of Care  Goal: Plan of Care Review  Outcome: Ongoing, Progressing  Goal: Patient-Specific Goal (Individualized)  Outcome: Ongoing, Progressing  Goal: Absence of Hospital-Acquired Illness or Injury  Outcome: Ongoing, Progressing  Goal: Optimal Comfort and Wellbeing  Outcome: Ongoing, Progressing  Goal: Readiness for Transition of Care  Outcome: Ongoing, Progressing     Problem: Bariatric Environmental Safety  Goal: Safety Maintained with Care  Outcome: Ongoing, Progressing

## 2022-01-01 NOTE — ASSESSMENT & PLAN NOTE
Continue IV zosyn and IV vancomycin, added clindamycin by Dr Murrieta  Follow blood cultures  Being taken to the OR 1/1/22  Has developed necrotizing fascitis most likely, says she kept delaying in seeking medical attention until it got unbearable.

## 2022-01-01 NOTE — HPI
41-year-old  female presented to the ER with complaints of lower left extremity pain in the thigh was spreading up to the groin and pubic area.  Patient states this started on Tuesday and she thought she has had a little folliculitis and this has progressively worsened.  Patient did develop cough few days ago and has tested positive for COVID.  Soft tissue ultrasound performed which showed phlegmon versus early abscess around the area.  This morning there was necrotic skin in the thigh in the pubic area is extremely tender and indurated.  Denies any chest pain or current shortness of breath

## 2022-01-01 NOTE — ANESTHESIA PREPROCEDURE EVALUATION
01/01/2022  Flor Kelley is a 41 y.o., female.    Anesthesia Evaluation    I have reviewed the Patient Summary Reports.   I have reviewed the NPO Status.   I have reviewed the Medications.     Review of Systems         Anesthesia Plan  Type of Anesthesia, risks & benefits discussed:  Anesthesia Type:  general    Patient's Preference:   Plan Factors:          Intra-op Monitoring Plan: standard ASA monitors  Intra-op Monitoring Plan Comments:   Post Op Pain Control Plan: IV/PO Opioids PRN  Post Op Pain Control Plan Comments:     Induction:   IV  Beta Blocker:         Informed Consent: Patient understands risks and agrees with Anesthesia plan.  Questions answered. Anesthesia consent signed with patient.  ASA Score: 3     Day of Surgery Review of History & Physical:            Ready For Surgery From Anesthesia Perspective.     NPO greater than 8 hours  NAC  NKDA    Hct 44  Cr 3.6  Ca 8.0    HTN  COVID positive (airborne/contact/droplet precautions)  Necrotizing fasciitis of left thigh, groin and pubic region  CKD (likely ROSHAN superimposed upon CKD)  Hypocalcemia    Airway exam deferred (COVID precautions); adequate ROM at neck.

## 2022-01-01 NOTE — PROGRESS NOTES
Pharmacy consulted to assist in the management of vancomycin therapy for this 41 year-old female with suspected skin and soft tissue infection. Goal vancomycin trough for this indication is 10-15 mcg/mL. Current patient info includes estimated CrCl of 26, BUN=18, Scr=2.84, weight=95.3 kg. Based on this will initiate vancomycin 1750 mg IV every 48 hours starting 12/31 at 2330. Trough ordered for 1/4 at 2300  before 3rd dose. Pharmacy will continue to monitor daily and make adjustments as necessary.    Thank you for the consult,  Yael Little, PharmD  Ext. [phone]

## 2022-01-01 NOTE — ASSESSMENT & PLAN NOTE
To the OR for debridement of left thigh, groin and pubic region.    Risks and benefits explained with the patient including risks for infection, bleeding, injury to surrounding structures, hematoma/seroma formation with need for possible evacuation, possible open.  The patient verbalized understanding, agrees and wishes to proceed with surgery.    We will add clindamycin IV; continue Zosyn and vancomycin until cultures speciate.    NPO; Starting Heparin SQ q8; stop Eliquis for now (patient has not received a dose yet).

## 2022-01-01 NOTE — HPI
40 yo F presents to the ED with worsening LLE pain and swelling.  She states that she has not had any trauma to LLE but has noticed redness and swelling worsening over the past several days.  She has not had F/C but states that area has become warm and tender.  CT done in ED showed small abscess only.  Lactic acid 3.2 but repeat 1.8.  She has elevated BP and says she has not been able to afford any meds and that she has not seen a PCP since her diagnosis of HTN.  Her creat is 2.8 and is most likely chronic.  She has marked pronteinuria.      Patient has had a dry cough and has lost her appetitie in the last two days.  She has not had COVID vaccinations and she did attend a [place] party recently and she is now COVID positive.  She is not hypoxic or SOB at present.  She is currently on her menstrual cycle and urine pregnancy is negative.

## 2022-01-01 NOTE — ASSESSMENT & PLAN NOTE
Steriods, anticoagulation and H2 blockers  Monitor for oxygen requirement  Recommend monoclonal antibodies if can be given IP.

## 2022-01-01 NOTE — PLAN OF CARE
[facility] No family present at the hospital.

## 2022-01-01 NOTE — TRANSFER OF CARE
Anesthesia Transfer of Care Note    Patient: Flor Kelley    Procedure(s) Performed: Procedure(s) (LRB):  DEBRIDEMENT, LEFT LOWER EXTREMITY/Groin/Pubis (Left)    Patient location: PACU    Anesthesia Type: general    Transport from OR: Transported from OR on room air with adequate spontaneous ventilation    Post pain: adequate analgesia    Post assessment: no apparent anesthetic complications    Post vital signs: stable    Level of consciousness: responds to stimulation    Nausea/Vomiting: no nausea/vomiting    Complications: none    Transfer of care protocol was followed      Last vitals:   Visit Vitals  /83 (BP Location: Right arm, Patient Position: Lying)   Pulse (!) 116   Temp 36.6 °C (97.8 °F) (Oral)   Resp (!) 24   Ht 5' (1.524 m)   Wt 82.4 kg (181 lb 9.6 oz)   LMP 12/31/2021   SpO2 97%   Breastfeeding No   BMI 35.47 kg/m²

## 2022-01-01 NOTE — CONSULTS
[address]  General Surgery  Consult Note    Patient Name: Flor Kelley  MRN: [id number]  Code Status: Full Code  Admission Date: 12/31/2021  Hospital Length of Stay: 1 days  Attending Physician: Jairo Anne MD  Primary Care Provider: Primary Doctor No    Patient information was obtained from patient and ER records.     Inpatient consult to General Surgery  Consult performed by: Brandyn Murrieta DO  Consult ordered by: Flor Mccain MD        Subjective:     Principal Problem: Necrotizing fasciitis    History of Present Illness: 41-year-old  female presented to the ER with complaints of lower left extremity pain in the thigh was spreading up to the groin and pubic area.  Patient states this started on Tuesday and she thought she has had a little folliculitis and this has progressively worsened.  Patient did develop cough few days ago and has tested positive for COVID.  Soft tissue ultrasound performed which showed phlegmon versus early abscess around the area.  This morning there was necrotic skin in the thigh in the pubic area is extremely tender and indurated.  Denies any chest pain or current shortness of breath      No current facility-administered medications on file prior to encounter.     No current outpatient medications on file prior to encounter.       Review of patient's allergies indicates:  No Known Allergies    Past Medical History:   Diagnosis Date    Hypertension      Past Surgical History:   Procedure Laterality Date    NO PAST SURGERIES       Family History     Problem Relation (Age of Onset)    Hypertension Mother        Tobacco Use    Smoking status: Current Some Day Smoker    Smokeless tobacco: Never Used   Substance and Sexual Activity    Alcohol use: Not Currently    Drug use: Never    Sexual activity: Not Currently     Review of Systems   Constitutional: Negative.  Negative for fatigue and unexpected weight change.   HENT:  Negative.  Negative for trouble swallowing.    Eyes: Negative.    Respiratory: Negative.  Negative for chest tightness and shortness of breath.    Cardiovascular: Negative.  Negative for chest pain.   Gastrointestinal: Negative.  Negative for abdominal pain, blood in stool and nausea.   Endocrine: Negative.    Genitourinary: Negative.  Negative for hematuria.   Musculoskeletal: Negative.  Negative for back pain and myalgias.        Left thigh/groin/pubic pain    Neurological: Negative.  Negative for dizziness, speech difficulty, weakness and light-headedness.   Psychiatric/Behavioral: Negative.  Negative for agitation and behavioral problems.     Objective:     Vital Signs (Most Recent):  Temp: 97.4 °F (36.3 °C) (01/01/22 0400)  Pulse: (!) 133 (01/01/22 0400)  Resp: 18 (01/01/22 0532)  BP: 118/85 (01/01/22 0400)  SpO2: 97 % (01/01/22 0400) Vital Signs (24h Range):  Temp:  [97.4 °F (36.3 °C)-99.1 °F (37.3 °C)] 97.4 °F (36.3 °C)  Pulse:  [119-133] 133  Resp:  [18-20] 18  SpO2:  [96 %-99 %] 97 %  BP: (118-180)/() 118/85     Weight: 82.4 kg (181 lb 9.6 oz)  Body mass index is 35.47 kg/m².    Physical Exam  Constitutional:       General: She is not in acute distress.     Appearance: Normal appearance.   HENT:      Head: Normocephalic and atraumatic.      Nose: Nose normal.   Eyes:      General: No scleral icterus.     Pupils: Pupils are equal, round, and reactive to light.   Cardiovascular:      Rate and Rhythm: Normal rate.   Pulmonary:      Effort: Pulmonary effort is normal. No respiratory distress.      Breath sounds: Normal breath sounds.   Abdominal:      General: There is no distension.      Palpations: Abdomen is soft.      Tenderness: There is no abdominal tenderness. There is no guarding.   Musculoskeletal:         General: Normal range of motion.      Cervical back: Normal range of motion and neck supple.        Legs:       Comments: Erythema and induration across the suprapubic area extending down the  left groin and left thigh; there is necrotic skin in the left inguinal region   Skin:     General: Skin is warm.      Coloration: Skin is not jaundiced.   Neurological:      General: No focal deficit present.      Mental Status: She is alert and oriented to person, place, and time.      Cranial Nerves: No cranial nerve deficit.   Psychiatric:         Mood and Affect: Mood normal.         Significant Labs:  I have reviewed all pertinent lab results within the past 24 hours.  CBC:   Recent Labs   Lab 12/31/21  1720   WBC 5.84   RBC 5.59*   HGB 14.8   HCT 44.6      MCV 79.8*   MCH 26.5*   MCHC 33.2     BMP:   Recent Labs   Lab 01/01/22  0639   *   *   K 4.1   CL 96*   CO2 16*   BUN 28*   CREATININE 3.59*   CALCIUM 8.0*       Significant Diagnostics:  I have reviewed all pertinent imaging results/findings within the past 24 hours.    Assessment/Plan:     * Necrotizing fasciitis  To the OR for debridement of left thigh, groin and pubic region.    Risks and benefits explained with the patient including risks for infection, bleeding, injury to surrounding structures, hematoma/seroma formation with need for possible evacuation, possible open.  The patient verbalized understanding, agrees and wishes to proceed with surgery.    We will add clindamycin IV; continue Zosyn and vancomycin until cultures speciate.    NPO; Starting Heparin SQ q8; stop Eliquis for now (patient has not received a dose yet).      VTE Risk Mitigation (From admission, onward)         Ordered     heparin (porcine) injection 5,000 Units  Every 8 hours         01/01/22 0658                Thank you for your consult. I will follow-up with patient. Please contact us if you have any additional questions.    Brandyn Motley, DO  General Surgery  [facility] - [address] [place] [address]

## 2022-01-01 NOTE — ASSESSMENT & PLAN NOTE
Continue IV zosyn and IV vancomycin  Follow blood cultures  Appreciate surgical recommendations

## 2022-01-01 NOTE — SUBJECTIVE & OBJECTIVE
Past Medical History:   Diagnosis Date    Hypertension        Past Surgical History:   Procedure Laterality Date    NO PAST SURGERIES         Review of patient's allergies indicates:  No Known Allergies    No current facility-administered medications on file prior to encounter.     No current outpatient medications on file prior to encounter.     Family History     Problem Relation (Age of Onset)    Hypertension Mother        Tobacco Use    Smoking status: Current Some Day Smoker    Smokeless tobacco: Never Used   Substance and Sexual Activity    Alcohol use: Not Currently    Drug use: Never    Sexual activity: Not Currently     Review of Systems   Constitutional: Negative for appetite change, chills, fatigue, fever and unexpected weight change.   HENT: Negative for congestion, mouth sores, nosebleeds, sinus pain, sore throat and trouble swallowing.    Eyes: Negative for visual disturbance.   Respiratory: Positive for cough. Negative for apnea, chest tightness and shortness of breath.    Cardiovascular: Negative for chest pain, palpitations and leg swelling.   Gastrointestinal: Negative for abdominal pain, blood in stool, constipation, diarrhea, nausea and vomiting.   Endocrine: Negative for polydipsia and polyuria.   Genitourinary: Negative for decreased urine volume, difficulty urinating, dysuria, frequency and hematuria.   Musculoskeletal: Negative for arthralgias, back pain and neck pain.   Skin: Negative for rash.   Neurological: Negative for syncope, light-headedness and headaches.   Hematological: Does not bruise/bleed easily.   Psychiatric/Behavioral: Negative for confusion and suicidal ideas.     Objective:     Vital Signs (Most Recent):  Temp: 98.5 °F (36.9 °C) (01/01/22 0030)  Pulse: (!) 119 (01/01/22 0030)  Resp: 20 (01/01/22 0138)  BP: (!) 146/107 (01/01/22 0030)  SpO2: 97 % (01/01/22 0030) Vital Signs (24h Range):  Temp:  [98.5 °F (36.9 °C)-99.1 °F (37.3 °C)] 98.5 °F (36.9 °C)  Pulse:  [119-130]  119  Resp:  [20] 20  SpO2:  [96 %-99 %] 97 %  BP: (146-180)/(107-118) 146/107     Weight: 82.4 kg (181 lb 9.6 oz)  Body mass index is 35.47 kg/m².    Physical Exam  Vitals and nursing note reviewed. Exam conducted with a chaperone present.   Constitutional:       Appearance: She is obese. She is ill-appearing.   HENT:      Head: Atraumatic.      Mouth/Throat:      Mouth: Mucous membranes are moist.      Pharynx: Oropharynx is clear.   Eyes:      Conjunctiva/sclera: Conjunctivae normal.      Pupils: Pupils are equal, round, and reactive to light.   Cardiovascular:      Rate and Rhythm: Normal rate and regular rhythm.      Pulses: Normal pulses.      Heart sounds: Normal heart sounds.   Pulmonary:      Effort: Pulmonary effort is normal.      Breath sounds: Normal breath sounds.   Abdominal:      General: Abdomen is flat. Bowel sounds are normal.      Palpations: Abdomen is soft.   Musculoskeletal:         General: Normal range of motion.      Cervical back: Neck supple.   Skin:     General: Skin is warm and dry.      Capillary Refill: Capillary refill takes 2 to 3 seconds.      Coloration: Skin is not jaundiced or pale.      Findings: Erythema present. No bruising, lesion or rash.   Neurological:      General: No focal deficit present.      Mental Status: She is alert and oriented to person, place, and time.   Psychiatric:         Mood and Affect: Mood normal.           CRANIAL NERVES     CN III, IV, VI   Pupils are equal, round, and reactive to light.       Significant Labs: All pertinent labs within the past 24 hours have been reviewed.    Significant Imaging: I have reviewed all pertinent imaging results/findings within the past 24 hours.

## 2022-01-02 LAB
ALBUMIN SERPL BCP-MCNC: 1.3 G/DL (ref 3.5–5)
ALBUMIN/GLOB SERPL: 0.3 {RATIO}
ALP SERPL-CCNC: 66 U/L (ref 37–98)
ALT SERPL W P-5'-P-CCNC: 24 U/L (ref 13–56)
ANION GAP SERPL CALCULATED.3IONS-SCNC: 25 MMOL/L (ref 7–16)
AST SERPL W P-5'-P-CCNC: 51 U/L (ref 15–37)
BILIRUB SERPL-MCNC: 3.5 MG/DL (ref 0–1.2)
BUN SERPL-MCNC: 52 MG/DL (ref 7–18)
BUN/CREAT SERPL: 44 (ref 6–20)
CALCIUM SERPL-MCNC: 7.6 MG/DL (ref 8.5–10.1)
CHLORIDE SERPL-SCNC: 91 MMOL/L (ref 98–107)
CO2 SERPL-SCNC: 15 MMOL/L (ref 21–32)
CREAT SERPL-MCNC: 1.19 MG/DL (ref 0.55–1.02)
GLOBULIN SER-MCNC: 5.2 G/DL (ref 2–4)
GLUCOSE SERPL-MCNC: 106 MG/DL (ref 74–106)
POTASSIUM SERPL-SCNC: 4.4 MMOL/L (ref 3.5–5.1)
PROT SERPL-MCNC: 6.5 G/DL (ref 6.4–8.2)
SODIUM SERPL-SCNC: 127 MMOL/L (ref 136–145)

## 2022-01-02 PROCEDURE — 63600175 PHARM REV CODE 636 W HCPCS: Performed by: STUDENT IN AN ORGANIZED HEALTH CARE EDUCATION/TRAINING PROGRAM

## 2022-01-02 PROCEDURE — 99900035 HC TECH TIME PER 15 MIN (STAT)

## 2022-01-02 PROCEDURE — 80053 COMPREHEN METABOLIC PANEL: CPT | Performed by: INTERNAL MEDICINE

## 2022-01-02 PROCEDURE — 36415 COLL VENOUS BLD VENIPUNCTURE: CPT | Performed by: INTERNAL MEDICINE

## 2022-01-02 PROCEDURE — 25000003 PHARM REV CODE 250: Performed by: STUDENT IN AN ORGANIZED HEALTH CARE EDUCATION/TRAINING PROGRAM

## 2022-01-02 PROCEDURE — 99233 PR SUBSEQUENT HOSPITAL CARE,LEVL III: ICD-10-PCS | Mod: ,,, | Performed by: INTERNAL MEDICINE

## 2022-01-02 PROCEDURE — 11000001 HC ACUTE MED/SURG PRIVATE ROOM

## 2022-01-02 PROCEDURE — 25000003 PHARM REV CODE 250: Performed by: INTERNAL MEDICINE

## 2022-01-02 PROCEDURE — 99233 SBSQ HOSP IP/OBS HIGH 50: CPT | Mod: ,,, | Performed by: INTERNAL MEDICINE

## 2022-01-02 RX ORDER — SODIUM CHLORIDE, SODIUM GLUCONATE, SODIUM ACETATE, POTASSIUM CHLORIDE AND MAGNESIUM CHLORIDE 30; 37; 368; 526; 502 MG/100ML; MG/100ML; MG/100ML; MG/100ML; MG/100ML
1000 INJECTION, SOLUTION INTRAVENOUS CONTINUOUS
Status: DISCONTINUED | OUTPATIENT
Start: 2022-01-02 | End: 2022-01-03

## 2022-01-02 RX ADMIN — HEPARIN SODIUM 5000 UNITS: 5000 INJECTION INTRAVENOUS; SUBCUTANEOUS at 05:01

## 2022-01-02 RX ADMIN — FAMOTIDINE 20 MG: 20 TABLET, FILM COATED ORAL at 09:01

## 2022-01-02 RX ADMIN — HEPARIN SODIUM 5000 UNITS: 5000 INJECTION INTRAVENOUS; SUBCUTANEOUS at 01:01

## 2022-01-02 RX ADMIN — PIPERACILLIN AND TAZOBACTAM 4.5 G: 4; .5 INJECTION, POWDER, FOR SOLUTION INTRAVENOUS at 03:01

## 2022-01-02 RX ADMIN — PIPERACILLIN AND TAZOBACTAM 4.5 G: 4; .5 INJECTION, POWDER, FOR SOLUTION INTRAVENOUS at 07:01

## 2022-01-02 RX ADMIN — OXYCODONE HYDROCHLORIDE AND ACETAMINOPHEN 500 MG: 500 TABLET ORAL at 09:01

## 2022-01-02 RX ADMIN — CLINDAMYCIN IN 5 PERCENT DEXTROSE 900 MG: 18 INJECTION, SOLUTION INTRAVENOUS at 01:01

## 2022-01-02 RX ADMIN — CLINDAMYCIN IN 5 PERCENT DEXTROSE 900 MG: 18 INJECTION, SOLUTION INTRAVENOUS at 03:01

## 2022-01-02 RX ADMIN — Medication 1000 UNITS: at 09:01

## 2022-01-02 RX ADMIN — HEPARIN SODIUM 5000 UNITS: 5000 INJECTION INTRAVENOUS; SUBCUTANEOUS at 09:01

## 2022-01-02 RX ADMIN — Medication: at 11:01

## 2022-01-02 RX ADMIN — ZINC SULFATE 220 MG (50 MG) CAPSULE 220 MG: CAPSULE at 09:01

## 2022-01-02 RX ADMIN — PIPERACILLIN AND TAZOBACTAM 4.5 G: 4; .5 INJECTION, POWDER, FOR SOLUTION INTRAVENOUS at 12:01

## 2022-01-02 RX ADMIN — SODIUM CHLORIDE, SODIUM GLUCONATE, SODIUM ACETATE, POTASSIUM CHLORIDE AND MAGNESIUM CHLORIDE 1000 ML: 526; 502; 368; 37; 30 INJECTION, SOLUTION INTRAVENOUS at 01:01

## 2022-01-02 RX ADMIN — CLINDAMYCIN IN 5 PERCENT DEXTROSE 900 MG: 18 INJECTION, SOLUTION INTRAVENOUS at 07:01

## 2022-01-02 RX ADMIN — VANCOMYCIN HYDROCHLORIDE 1750 MG: 5 INJECTION, POWDER, LYOPHILIZED, FOR SOLUTION INTRAVENOUS at 09:01

## 2022-01-02 NOTE — PROGRESS NOTES
[facility] - [facility]  General Surgery  Progress Note    Subjective:     History of Present Illness:  41-year-old  female presented to the ER with complaints of lower left extremity pain in the thigh was spreading up to the groin and pubic area.  Patient states this started on Tuesday and she thought she has had a little folliculitis and this has progressively worsened.  Patient did develop cough few days ago and has tested positive for COVID.  Soft tissue ultrasound performed which showed phlegmon versus early abscess around the area.  This morning there was necrotic skin in the thigh in the pubic area is extremely tender and indurated.  Denies any chest pain or current shortness of breath      Post-Op Info:  Procedure(s) (LRB):  DEBRIDEMENT, LEFT LOWER EXTREMITY/Groin/Pubis (Left)   1 Day Post-Op     Interval History:  Stable, no acute events overnight.  Patient states pain has improved some    Medications:  Continuous Infusions:   sodium chloride 0.9% 150 mL/hr at 01/01/22 0934    hydromorphone in 0.9 % NaCl 6 mg/30 ml       Scheduled Meds:   ascorbic acid (vitamin C)  500 mg Oral Daily    clindamycin (CLEOCIN) IVPB  900 mg Intravenous Q8H    dexamethasone  4 mg Intravenous Daily    famotidine  20 mg Oral BID    heparin (porcine)  5,000 Units Subcutaneous Q8H    piperacillin-tazobactam (ZOSYN) IVPB  4.5 g Intravenous Q8H    vancomycin (VANCOCIN) IVPB  1,750 mg Intravenous Q48H    vitamin D  1,000 Units Oral Daily    zinc sulfate  220 mg Oral Daily     PRN Meds:acetaminophen, bisacodyL, dextromethorphan-guaiFENesin  mg/5 ml, naloxone, ondansetron, ondansetron, oxyCODONE, prochlorperazine, simethicone, traZODone, vancomycin - pharmacy to dose     Review of patient's allergies indicates:  No Known Allergies  Objective:     Vital Signs (Most Recent):  Temp: 98.2 °F (36.8 °C) (01/02/22 0455)  Pulse: 109 (01/02/22 0455)  Resp: (!) 22 (01/02/22 0455)  BP: 127/62  (01/02/22 0455)  SpO2: 98 % (01/02/22 0455) Vital Signs (24h Range):  Temp:  [97.5 °F (36.4 °C)-99.1 °F (37.3 °C)] 98.2 °F (36.8 °C)  Pulse:  [109-125] 109  Resp:  [20-25] 22  SpO2:  [93 %-98 %] 98 %  BP: (108-139)/(62-94) 127/62     Weight: 82.4 kg (181 lb 10.5 oz)  Body mass index is 35.48 kg/m².    Intake/Output - Last 3 Shifts       12/31 0700  01/01 0659 01/01 0700  01/02 0659 01/02 0700  01/03 0659    I.V. (mL/kg) 1000 (12.1)      IV Piggyback 350 1050     Total Intake(mL/kg) 1350 (16.4) 1050 (12.7)     Net +1350 +1050                  Physical Exam  Constitutional:       General: She is not in acute distress.     Appearance: Normal appearance.   HENT:      Head: Normocephalic.   Cardiovascular:      Rate and Rhythm: Normal rate.   Pulmonary:      Effort: Pulmonary effort is normal. No respiratory distress.   Abdominal:      General: There is no distension.      Tenderness: There is no abdominal tenderness.   Musculoskeletal:         General: Normal range of motion.      Comments: Dressing to pubic area and left thigh clean and intact   Skin:     General: Skin is warm.      Coloration: Skin is not jaundiced.   Neurological:      General: No focal deficit present.      Mental Status: She is alert and oriented to person, place, and time.      Cranial Nerves: No cranial nerve deficit.         Significant Labs:  I have reviewed all pertinent lab results within the past 24 hours.  CBC:   Recent Labs   Lab 01/01/22  0634   WBC 7.34   RBC 5.76*   HGB 15.3   HCT 44.1   *   MCV 76.6*   MCH 26.6*   MCHC 34.7     BMP:   Recent Labs   Lab 01/01/22  0639   *   *   K 4.1   CL 96*   CO2 16*   BUN 28*   CREATININE 3.59*   CALCIUM 8.0*       Significant Diagnostics:  I have reviewed all pertinent imaging results/findings within the past 24 hours.    Assessment/Plan:     * Necrotizing fasciitis  To the OR for debridement of left thigh, groin and pubic region.    Risks and benefits explained with the patient  including risks for infection, bleeding, injury to surrounding structures, hematoma/seroma formation with need for possible evacuation, possible open.  The patient verbalized understanding, agrees and wishes to proceed with surgery.    We will add clindamycin IV; continue Zosyn and vancomycin until cultures speciate.    NPO; Starting Heparin SQ q8; stop Eliquis for now (patient has not received a dose yet).    1/2:  Continue antibiotics.  Continue PCA.  Plan for OR tomorrow for debridement washout        Brandyn Motley, DO  General Surgery  [address]

## 2022-01-02 NOTE — PLAN OF CARE
Problem: Adult Inpatient Plan of Care  Goal: Plan of Care Review  Outcome: Ongoing, Progressing  Goal: Patient-Specific Goal (Individualized)  Outcome: Ongoing, Progressing  Goal: Absence of Hospital-Acquired Illness or Injury  Outcome: Ongoing, Progressing  Goal: Optimal Comfort and Wellbeing  Outcome: Ongoing, Progressing  Goal: Readiness for Transition of Care  Outcome: Ongoing, Progressing     Problem: Bariatric Environmental Safety  Goal: Safety Maintained with Care  Outcome: Ongoing, Progressing     Problem: Bariatric Environmental Safety  Goal: Safety Maintained with Care  Outcome: Ongoing, Progressing

## 2022-01-02 NOTE — SUBJECTIVE & OBJECTIVE
Interval History:  Stable, no acute events overnight.  Patient states pain has improved some    Medications:  Continuous Infusions:   sodium chloride 0.9% 150 mL/hr at 01/01/22 0934    hydromorphone in 0.9 % NaCl 6 mg/30 ml       Scheduled Meds:   ascorbic acid (vitamin C)  500 mg Oral Daily    clindamycin (CLEOCIN) IVPB  900 mg Intravenous Q8H    dexamethasone  4 mg Intravenous Daily    famotidine  20 mg Oral BID    heparin (porcine)  5,000 Units Subcutaneous Q8H    piperacillin-tazobactam (ZOSYN) IVPB  4.5 g Intravenous Q8H    vancomycin (VANCOCIN) IVPB  1,750 mg Intravenous Q48H    vitamin D  1,000 Units Oral Daily    zinc sulfate  220 mg Oral Daily     PRN Meds:acetaminophen, bisacodyL, dextromethorphan-guaiFENesin  mg/5 ml, naloxone, ondansetron, ondansetron, oxyCODONE, prochlorperazine, simethicone, traZODone, vancomycin - pharmacy to dose     Review of patient's allergies indicates:  No Known Allergies  Objective:     Vital Signs (Most Recent):  Temp: 98.2 °F (36.8 °C) (01/02/22 0455)  Pulse: 109 (01/02/22 0455)  Resp: (!) 22 (01/02/22 0455)  BP: 127/62 (01/02/22 0455)  SpO2: 98 % (01/02/22 0455) Vital Signs (24h Range):  Temp:  [97.5 °F (36.4 °C)-99.1 °F (37.3 °C)] 98.2 °F (36.8 °C)  Pulse:  [109-125] 109  Resp:  [20-25] 22  SpO2:  [93 %-98 %] 98 %  BP: (108-139)/(62-94) 127/62     Weight: 82.4 kg (181 lb 10.5 oz)  Body mass index is 35.48 kg/m².    Intake/Output - Last 3 Shifts       12/31 0700  01/01 0659 01/01 0700  01/02 0659 01/02 0700  01/03 0659    I.V. (mL/kg) 1000 (12.1)      IV Piggyback 350 1050     Total Intake(mL/kg) 1350 (16.4) 1050 (12.7)     Net +1350 +1050                  Physical Exam  Constitutional:       General: She is not in acute distress.     Appearance: Normal appearance.   HENT:      Head: Normocephalic.   Cardiovascular:      Rate and Rhythm: Normal rate.   Pulmonary:      Effort: Pulmonary effort is normal. No respiratory distress.   Abdominal:      General:  There is no distension.      Tenderness: There is no abdominal tenderness.   Musculoskeletal:         General: Normal range of motion.      Comments: Dressing to pubic area and left thigh clean and intact   Skin:     General: Skin is warm.      Coloration: Skin is not jaundiced.   Neurological:      General: No focal deficit present.      Mental Status: She is alert and oriented to person, place, and time.      Cranial Nerves: No cranial nerve deficit.         Significant Labs:  I have reviewed all pertinent lab results within the past 24 hours.  CBC:   Recent Labs   Lab 01/01/22  0634   WBC 7.34   RBC 5.76*   HGB 15.3   HCT 44.1   *   MCV 76.6*   MCH 26.6*   MCHC 34.7     BMP:   Recent Labs   Lab 01/01/22  0639   *   *   K 4.1   CL 96*   CO2 16*   BUN 28*   CREATININE 3.59*   CALCIUM 8.0*       Significant Diagnostics:  I have reviewed all pertinent imaging results/findings within the past 24 hours.

## 2022-01-02 NOTE — ASSESSMENT & PLAN NOTE
To the OR for debridement of left thigh, groin and pubic region.    Risks and benefits explained with the patient including risks for infection, bleeding, injury to surrounding structures, hematoma/seroma formation with need for possible evacuation, possible open.  The patient verbalized understanding, agrees and wishes to proceed with surgery.    We will add clindamycin IV; continue Zosyn and vancomycin until cultures speciate.    NPO; Starting Heparin SQ q8; stop Eliquis for now (patient has not received a dose yet).    1/2:  Continue antibiotics.  Continue PCA.  Plan for OR tomorrow for debridement washout

## 2022-01-02 NOTE — SUBJECTIVE & OBJECTIVE
Interval History:     Pt has no respiratory symptoms, no SOB COUGH OR DYSPNEA, satting >95% on ra, s/p antibody infusion yesterday, will therefore d/c steroids elle she is in the setting of active bacterial infection     Review of Systems   Constitutional: Positive for fatigue and fever. Negative for appetite change, chills and unexpected weight change.   HENT: Negative for congestion, mouth sores, nosebleeds, sinus pain, sore throat and trouble swallowing.    Eyes: Negative for visual disturbance.   Respiratory: Positive for cough. Negative for apnea, chest tightness and shortness of breath.    Cardiovascular: Negative for chest pain, palpitations and leg swelling.   Gastrointestinal: Negative for abdominal pain, blood in stool, constipation, diarrhea, nausea and vomiting.   Endocrine: Negative for polydipsia and polyuria.   Genitourinary: Negative for decreased urine volume, difficulty urinating, dysuria, frequency and hematuria.   Musculoskeletal: Negative for arthralgias, back pain and neck pain.   Skin: Positive for color change.        Abscess/cellulitis in groin and thigh.   Neurological: Negative for syncope, light-headedness and headaches.   Hematological: Does not bruise/bleed easily.   Psychiatric/Behavioral: Negative for confusion and suicidal ideas.     Objective:     Vital Signs (Most Recent):  Temp: 98.3 °F (36.8 °C) (01/02/22 1145)  Pulse: (!) 112 (01/02/22 1145)  Resp: 20 (01/02/22 1150)  BP: (!) 155/99 (01/02/22 1145)  SpO2: 99 % (01/02/22 1145) Vital Signs (24h Range):  Temp:  [97.5 °F (36.4 °C)-98.9 °F (37.2 °C)] 98.3 °F (36.8 °C)  Pulse:  [109-125] 112  Resp:  [20-22] 20  SpO2:  [93 %-99 %] 99 %  BP: (108-155)/(62-99) 155/99     Weight: 82.4 kg (181 lb 10.5 oz)  Body mass index is 35.48 kg/m².  No intake or output data in the 24 hours ending 01/02/22 1256   Physical Exam  Constitutional:       General: She is not in acute distress.     Appearance: Normal appearance.   HENT:      Head:  Normocephalic.   Cardiovascular:      Rate and Rhythm: Normal rate.   Pulmonary:      Effort: Pulmonary effort is normal. No respiratory distress.   Abdominal:      General: There is no distension.      Tenderness: There is no abdominal tenderness.   Musculoskeletal:         General: Normal range of motion.      Comments: Dressing to pubic area and left thigh clean and intact   Skin:     General: Skin is warm.      Coloration: Skin is not jaundiced.   Neurological:      General: No focal deficit present.      Mental Status: She is alert and oriented to person, place, and time.      Cranial Nerves: No cranial nerve deficit.         Significant Labs: All pertinent labs within the past 24 hours have been reviewed.    Significant Imaging: I have reviewed all pertinent imaging results/findings within the past 24 hours.

## 2022-01-02 NOTE — ASSESSMENT & PLAN NOTE
DVT Ppx and H2 blockers  Monitor for oxygen requirement  No steroids as she is asymptomatic  S/p bam therapy      Pharmacy consulted for monoclonal ab.

## 2022-01-02 NOTE — PROGRESS NOTES
[facility] - [facility] Medicine  Progress Note    Patient Name: Flor Kelley  MRN: [id number]  Patient Class: IP- Inpatient   Admission Date: 12/31/2021  Length of Stay: 2 days  Attending Physician: Jairo Anne MD  Primary Care Provider: Primary Doctor No        Subjective:     Principal Problem:Necrotizing fasciitis        HPI:  40 yo F presents to the ED with worsening LLE pain and swelling.  She states that she has not had any trauma to LLE but has noticed redness and swelling worsening over the past several days.  She has not had F/C but states that area has become warm and tender.  CT done in ED showed small abscess only.  Lactic acid 3.2 but repeat 1.8.  She has elevated BP and says she has not been able to afford any meds and that she has not seen a PCP since her diagnosis of HTN.  Her creat is 2.8 and is most likely chronic.  She has marked pronteinuria.      Patient has had a dry cough and has lost her appetitie in the last two days.  She has not had COVID vaccinations and she did attend a [employer] party recently and she is now COVID positive.  She is not hypoxic or SOB at present.  She is currently on her menstrual cycle and urine pregnancy is negative.        Overview/Hospital Course:  No notes on file    Interval History:     Pt has no respiratory symptoms, no SOB COUGH OR DYSPNEA, satting >95% on ra, s/p antibody infusion yesterday, will therefore d/c steroids elle she is in the setting of active bacterial infection     Review of Systems   Constitutional: Positive for fatigue and fever. Negative for appetite change, chills and unexpected weight change.   HENT: Negative for congestion, mouth sores, nosebleeds, sinus pain, sore throat and trouble swallowing.    Eyes: Negative for visual disturbance.   Respiratory: Positive for cough. Negative for apnea, chest tightness and shortness of breath.    Cardiovascular: Negative for chest pain, palpitations and leg swelling.    Gastrointestinal: Negative for abdominal pain, blood in stool, constipation, diarrhea, nausea and vomiting.   Endocrine: Negative for polydipsia and polyuria.   Genitourinary: Negative for decreased urine volume, difficulty urinating, dysuria, frequency and hematuria.   Musculoskeletal: Negative for arthralgias, back pain and neck pain.   Skin: Positive for color change.        Abscess/cellulitis in groin and thigh.   Neurological: Negative for syncope, light-headedness and headaches.   Hematological: Does not bruise/bleed easily.   Psychiatric/Behavioral: Negative for confusion and suicidal ideas.     Objective:     Vital Signs (Most Recent):  Temp: 98.3 °F (36.8 °C) (01/02/22 1145)  Pulse: (!) 112 (01/02/22 1145)  Resp: 20 (01/02/22 1150)  BP: (!) 155/99 (01/02/22 1145)  SpO2: 99 % (01/02/22 1145) Vital Signs (24h Range):  Temp:  [97.5 °F (36.4 °C)-98.9 °F (37.2 °C)] 98.3 °F (36.8 °C)  Pulse:  [109-125] 112  Resp:  [20-22] 20  SpO2:  [93 %-99 %] 99 %  BP: (108-155)/(62-99) 155/99     Weight: 82.4 kg (181 lb 10.5 oz)  Body mass index is 35.48 kg/m².  No intake or output data in the 24 hours ending 01/02/22 1256   Physical Exam  Constitutional:       General: She is not in acute distress.     Appearance: Normal appearance.   HENT:      Head: Normocephalic.   Cardiovascular:      Rate and Rhythm: Normal rate.   Pulmonary:      Effort: Pulmonary effort is normal. No respiratory distress.   Abdominal:      General: There is no distension.      Tenderness: There is no abdominal tenderness.   Musculoskeletal:         General: Normal range of motion.      Comments: Dressing to pubic area and left thigh clean and intact   Skin:     General: Skin is warm.      Coloration: Skin is not jaundiced.   Neurological:      General: No focal deficit present.      Mental Status: She is alert and oriented to person, place, and time.      Cranial Nerves: No cranial nerve deficit.         Significant Labs: All pertinent labs within  the past 24 hours have been reviewed.    Significant Imaging: I have reviewed all pertinent imaging results/findings within the past 24 hours.      Assessment/Plan:      * Necrotizing fasciitis    OR 1/1/22    Vanc, zosyn and clindamycin          Hypertension  Hold any therapy d/t sepsis      COVID-19  DVT Ppx and H2 blockers  Monitor for oxygen requirement  No steroids as she is asymptomatic  S/p bam therapy      Pharmacy consulted for monoclonal ab.      Cellulitis  Continue IV zosyn and IV vancomycin, added clindamycin by Dr Murrieta  Follow blood cultures  Being taken to the OR 1/1/22  Has developed necrotizing fascitis most likely, says she kept delaying in seeking medical attention until it got unbearable.           VTE Risk Mitigation (From admission, onward)         Ordered     Place sequential compression device  Until discontinued         01/01/22 1548     heparin (porcine) injection 5,000 Units  Every 8 hours         01/01/22 0628                Discharge Planning   JOSÉ MIGUEL:      Code Status: Full Code   Is the patient medically ready for discharge?:     Reason for patient still in hospital (select all that apply): Treatment                     Rehmat PAUL Anne MD  Department of Hospital Medicine   [address]

## 2022-01-03 ENCOUNTER — ANESTHESIA (OUTPATIENT)
Dept: SURGERY | Facility: HOSPITAL | Age: 42
DRG: 463 | End: 2022-01-03

## 2022-01-03 ENCOUNTER — ANESTHESIA EVENT (OUTPATIENT)
Dept: SURGERY | Facility: HOSPITAL | Age: 42
DRG: 463 | End: 2022-01-03

## 2022-01-03 PROBLEM — G93.41 ENCEPHALOPATHY, METABOLIC: Status: ACTIVE | Noted: 2022-01-03

## 2022-01-03 PROBLEM — N17.9 AKI (ACUTE KIDNEY INJURY): Status: ACTIVE | Noted: 2022-01-03

## 2022-01-03 LAB
ALBUMIN SERPL BCP-MCNC: 0.9 G/DL (ref 3.5–5)
ALBUMIN/GLOB SERPL: 0.2 {RATIO}
ALP SERPL-CCNC: 78 U/L (ref 37–98)
ALT SERPL W P-5'-P-CCNC: 19 U/L (ref 13–56)
ANION GAP SERPL CALCULATED.3IONS-SCNC: 25 MMOL/L (ref 7–16)
ANISOCYTOSIS BLD QL SMEAR: ABNORMAL
AST SERPL W P-5'-P-CCNC: 46 U/L (ref 15–37)
BACTERIA BIOCHEM PROFILE ISLT CULT: ABNORMAL
BASOPHILS # BLD AUTO: 0.01 K/UL (ref 0–0.2)
BASOPHILS NFR BLD AUTO: 0.1 % (ref 0–1)
BILIRUB SERPL-MCNC: 3.4 MG/DL (ref 0–1.2)
BUN SERPL-MCNC: 65 MG/DL (ref 7–18)
BUN SERPL-MCNC: 65 MG/DL (ref 7–18)
BUN/CREAT SERPL: 10 (ref 6–20)
BUN/CREAT SERPL: 10 (ref 6–20)
CALCIUM SERPL-MCNC: 7.2 MG/DL (ref 8.5–10.1)
CHLORIDE SERPL-SCNC: 90 MMOL/L (ref 98–107)
CO2 SERPL-SCNC: 14 MMOL/L (ref 21–32)
CREAT SERPL-MCNC: 6.53 MG/DL (ref 0.55–1.02)
CREAT SERPL-MCNC: 6.53 MG/DL (ref 0.55–1.02)
CRENATED CELLS: ABNORMAL
DIFFERENTIAL METHOD BLD: ABNORMAL
EOSINOPHIL # BLD AUTO: 0 K/UL (ref 0–0.5)
EOSINOPHIL NFR BLD AUTO: 0 % (ref 1–4)
ERYTHROCYTE [DISTWIDTH] IN BLOOD BY AUTOMATED COUNT: 15.6 % (ref 11.5–14.5)
GLOBULIN SER-MCNC: 5.3 G/DL (ref 2–4)
GLUCOSE SERPL-MCNC: 149 MG/DL (ref 70–105)
GLUCOSE SERPL-MCNC: 156 MG/DL (ref 70–105)
GLUCOSE SERPL-MCNC: 71 MG/DL (ref 74–106)
HBV SURFACE AB SER-ACNC: NORMAL M[IU]/ML
HBV SURFACE AG SERPL QL IA: NORMAL
HCT VFR BLD AUTO: 29.1 % (ref 38–47)
HCT VFR BLD AUTO: 31.6 % (ref 38–47)
HCV AB SER QL: REACTIVE
HGB BLD-MCNC: 10 G/DL (ref 12–16)
HGB BLD-MCNC: 11.3 G/DL (ref 12–16)
HIV 1+O+2 AB SERPL QL: NORMAL
IMM GRANULOCYTES # BLD AUTO: 0.18 K/UL (ref 0–0.04)
IMM GRANULOCYTES NFR BLD: 1.1 % (ref 0–0.4)
LYMPHOCYTES # BLD AUTO: 0.55 K/UL (ref 1–4.8)
LYMPHOCYTES NFR BLD AUTO: 3.2 % (ref 27–41)
LYMPHOCYTES NFR BLD MANUAL: 7 % (ref 27–41)
MCH RBC QN AUTO: 26.3 PG (ref 27–31)
MCHC RBC AUTO-ENTMCNC: 35.8 G/DL (ref 32–36)
MCV RBC AUTO: 73.5 FL (ref 80–96)
MICROORGANISM SPEC CULT: ABNORMAL
MONOCYTES # BLD AUTO: 0.62 K/UL (ref 0–0.8)
MONOCYTES NFR BLD AUTO: 3.7 % (ref 2–6)
MONOCYTES NFR BLD MANUAL: 6 % (ref 2–6)
MPC BLD CALC-MCNC: 11.1 FL (ref 9.4–12.4)
NEUTROPHILS # BLD AUTO: 15.58 K/UL (ref 1.8–7.7)
NEUTROPHILS NFR BLD AUTO: 91.9 % (ref 53–65)
NEUTS BAND NFR BLD MANUAL: 42 % (ref 1–5)
NEUTS SEG NFR BLD MANUAL: 45 % (ref 50–62)
NRBC # BLD AUTO: 0.11 X10E3/UL
NRBC, AUTO (.00): 0.6 %
PLATELET # BLD AUTO: 126 K/UL (ref 150–400)
PLATELET MORPHOLOGY: ABNORMAL
POTASSIUM SERPL-SCNC: 5.4 MMOL/L (ref 3.5–5.1)
PROT SERPL-MCNC: 6.2 G/DL (ref 6.4–8.2)
RBC # BLD AUTO: 4.3 M/UL (ref 4.2–5.4)
SODIUM SERPL-SCNC: 124 MMOL/L (ref 136–145)
WBC # BLD AUTO: 16.94 K/UL (ref 4.5–11)

## 2022-01-03 PROCEDURE — 87340 HEPATITIS B SURFACE AG IA: CPT | Performed by: INTERNAL MEDICINE

## 2022-01-03 PROCEDURE — 25000003 PHARM REV CODE 250: Performed by: STUDENT IN AN ORGANIZED HEALTH CARE EDUCATION/TRAINING PROGRAM

## 2022-01-03 PROCEDURE — 11004 PR DEBRIDE NECROTIC SKIN/ TISSUE, GENIT & PERINM: ICD-10-PCS | Mod: 22,,, | Performed by: STUDENT IN AN ORGANIZED HEALTH CARE EDUCATION/TRAINING PROGRAM

## 2022-01-03 PROCEDURE — 82962 GLUCOSE BLOOD TEST: CPT

## 2022-01-03 PROCEDURE — 85014 HEMATOCRIT: CPT | Performed by: STUDENT IN AN ORGANIZED HEALTH CARE EDUCATION/TRAINING PROGRAM

## 2022-01-03 PROCEDURE — 63600175 PHARM REV CODE 636 W HCPCS: Performed by: STUDENT IN AN ORGANIZED HEALTH CARE EDUCATION/TRAINING PROGRAM

## 2022-01-03 PROCEDURE — 27000510 HC BLANKET BAIR HUGGER ANY SIZE: Performed by: ANESTHESIOLOGY

## 2022-01-03 PROCEDURE — 99255 IP/OBS CONSLTJ NEW/EST HI 80: CPT | Mod: ,,, | Performed by: INTERNAL MEDICINE

## 2022-01-03 PROCEDURE — 99255 PR INITIAL INPATIENT CONSULT,LEVL V: ICD-10-PCS | Mod: ,,, | Performed by: INTERNAL MEDICINE

## 2022-01-03 PROCEDURE — 63600175 PHARM REV CODE 636 W HCPCS: Performed by: NURSE ANESTHETIST, CERTIFIED REGISTERED

## 2022-01-03 PROCEDURE — 11004 DBRDMT SKIN XTRNL GENT&PER: CPT | Mod: 22,,, | Performed by: STUDENT IN AN ORGANIZED HEALTH CARE EDUCATION/TRAINING PROGRAM

## 2022-01-03 PROCEDURE — 86704 HEP B CORE ANTIBODY TOTAL: CPT | Mod: 90 | Performed by: INTERNAL MEDICINE

## 2022-01-03 PROCEDURE — 27201423 OPTIME MED/SURG SUP & DEVICES STERILE SUPPLY: Performed by: STUDENT IN AN ORGANIZED HEALTH CARE EDUCATION/TRAINING PROGRAM

## 2022-01-03 PROCEDURE — 99233 PR SUBSEQUENT HOSPITAL CARE,LEVL III: ICD-10-PCS | Mod: ,,, | Performed by: INTERNAL MEDICINE

## 2022-01-03 PROCEDURE — 71000033 HC RECOVERY, INTIAL HOUR: Performed by: STUDENT IN AN ORGANIZED HEALTH CARE EDUCATION/TRAINING PROGRAM

## 2022-01-03 PROCEDURE — 36000707: Performed by: STUDENT IN AN ORGANIZED HEALTH CARE EDUCATION/TRAINING PROGRAM

## 2022-01-03 PROCEDURE — 80053 COMPREHEN METABOLIC PANEL: CPT | Performed by: INTERNAL MEDICINE

## 2022-01-03 PROCEDURE — 36415 COLL VENOUS BLD VENIPUNCTURE: CPT | Performed by: STUDENT IN AN ORGANIZED HEALTH CARE EDUCATION/TRAINING PROGRAM

## 2022-01-03 PROCEDURE — 27000177 HC AIRWAY, LARYNGEAL MASK: Performed by: ANESTHESIOLOGY

## 2022-01-03 PROCEDURE — 36000706: Performed by: STUDENT IN AN ORGANIZED HEALTH CARE EDUCATION/TRAINING PROGRAM

## 2022-01-03 PROCEDURE — 99233 SBSQ HOSP IP/OBS HIGH 50: CPT | Mod: ,,, | Performed by: INTERNAL MEDICINE

## 2022-01-03 PROCEDURE — 63600175 PHARM REV CODE 636 W HCPCS: Performed by: INTERNAL MEDICINE

## 2022-01-03 PROCEDURE — D9220A PRA ANESTHESIA: ICD-10-PCS | Mod: ,,, | Performed by: ANESTHESIOLOGY

## 2022-01-03 PROCEDURE — 25000003 PHARM REV CODE 250: Performed by: INTERNAL MEDICINE

## 2022-01-03 PROCEDURE — 27000716 HC OXISENSOR PROBE, ANY SIZE: Performed by: ANESTHESIOLOGY

## 2022-01-03 PROCEDURE — 37000009 HC ANESTHESIA EA ADD 15 MINS: Performed by: STUDENT IN AN ORGANIZED HEALTH CARE EDUCATION/TRAINING PROGRAM

## 2022-01-03 PROCEDURE — 11000001 HC ACUTE MED/SURG PRIVATE ROOM

## 2022-01-03 PROCEDURE — 25000003 PHARM REV CODE 250: Performed by: NURSE ANESTHETIST, CERTIFIED REGISTERED

## 2022-01-03 PROCEDURE — D9220A PRA ANESTHESIA: Mod: ,,, | Performed by: ANESTHESIOLOGY

## 2022-01-03 PROCEDURE — 86803 HEPATITIS C AB TEST: CPT | Performed by: INTERNAL MEDICINE

## 2022-01-03 PROCEDURE — 87389 HIV-1 AG W/HIV-1&-2 AB AG IA: CPT | Performed by: INTERNAL MEDICINE

## 2022-01-03 PROCEDURE — 86706 HEP B SURFACE ANTIBODY: CPT | Performed by: INTERNAL MEDICINE

## 2022-01-03 PROCEDURE — 85025 COMPLETE CBC W/AUTO DIFF WBC: CPT | Performed by: STUDENT IN AN ORGANIZED HEALTH CARE EDUCATION/TRAINING PROGRAM

## 2022-01-03 PROCEDURE — 87522 HEPATITIS C REVRS TRNSCRPJ: CPT | Mod: 90 | Performed by: INTERNAL MEDICINE

## 2022-01-03 PROCEDURE — 37000008 HC ANESTHESIA 1ST 15 MINUTES: Performed by: STUDENT IN AN ORGANIZED HEALTH CARE EDUCATION/TRAINING PROGRAM

## 2022-01-03 RX ORDER — CEFAZOLIN SODIUM 1 G/3ML
INJECTION, POWDER, FOR SOLUTION INTRAMUSCULAR; INTRAVENOUS
Status: DISCONTINUED | OUTPATIENT
Start: 2022-01-03 | End: 2022-01-03

## 2022-01-03 RX ORDER — MEPERIDINE HYDROCHLORIDE 25 MG/ML
25 INJECTION INTRAMUSCULAR; INTRAVENOUS; SUBCUTANEOUS EVERY 10 MIN PRN
Status: DISCONTINUED | OUTPATIENT
Start: 2022-01-03 | End: 2022-01-03 | Stop reason: HOSPADM

## 2022-01-03 RX ORDER — PROPOFOL 10 MG/ML
INJECTION, EMULSION INTRAVENOUS
Status: DISCONTINUED | OUTPATIENT
Start: 2022-01-03 | End: 2022-01-03

## 2022-01-03 RX ORDER — MORPHINE SULFATE 10 MG/ML
4 INJECTION INTRAMUSCULAR; INTRAVENOUS; SUBCUTANEOUS EVERY 5 MIN PRN
Status: DISCONTINUED | OUTPATIENT
Start: 2022-01-03 | End: 2022-01-03 | Stop reason: HOSPADM

## 2022-01-03 RX ORDER — ONDANSETRON 2 MG/ML
4 INJECTION INTRAMUSCULAR; INTRAVENOUS DAILY PRN
Status: DISCONTINUED | OUTPATIENT
Start: 2022-01-03 | End: 2022-01-03 | Stop reason: HOSPADM

## 2022-01-03 RX ORDER — LIDOCAINE HYDROCHLORIDE 20 MG/ML
INJECTION, SOLUTION EPIDURAL; INFILTRATION; INTRACAUDAL; PERINEURAL
Status: DISCONTINUED | OUTPATIENT
Start: 2022-01-03 | End: 2022-01-03

## 2022-01-03 RX ORDER — SODIUM CHLORIDE, SODIUM LACTATE, POTASSIUM CHLORIDE, CALCIUM CHLORIDE 600; 310; 30; 20 MG/100ML; MG/100ML; MG/100ML; MG/100ML
INJECTION, SOLUTION INTRAVENOUS CONTINUOUS PRN
Status: DISCONTINUED | OUTPATIENT
Start: 2022-01-03 | End: 2022-01-03

## 2022-01-03 RX ORDER — OXYCODONE HYDROCHLORIDE 5 MG/1
5 TABLET ORAL
Status: DISCONTINUED | OUTPATIENT
Start: 2022-01-03 | End: 2022-01-03 | Stop reason: HOSPADM

## 2022-01-03 RX ORDER — SODIUM BICARBONATE 1 MEQ/ML
50 SYRINGE (ML) INTRAVENOUS ONCE
Status: COMPLETED | OUTPATIENT
Start: 2022-01-03 | End: 2022-01-03

## 2022-01-03 RX ORDER — HYDROMORPHONE HYDROCHLORIDE 2 MG/ML
0.5 INJECTION, SOLUTION INTRAMUSCULAR; INTRAVENOUS; SUBCUTANEOUS EVERY 5 MIN PRN
Status: DISCONTINUED | OUTPATIENT
Start: 2022-01-03 | End: 2022-01-03 | Stop reason: HOSPADM

## 2022-01-03 RX ORDER — FENTANYL CITRATE 50 UG/ML
INJECTION, SOLUTION INTRAMUSCULAR; INTRAVENOUS
Status: DISCONTINUED | OUTPATIENT
Start: 2022-01-03 | End: 2022-01-03

## 2022-01-03 RX ORDER — DIPHENHYDRAMINE HYDROCHLORIDE 50 MG/ML
25 INJECTION INTRAMUSCULAR; INTRAVENOUS EVERY 6 HOURS PRN
Status: DISCONTINUED | OUTPATIENT
Start: 2022-01-03 | End: 2022-01-03 | Stop reason: HOSPADM

## 2022-01-03 RX ADMIN — LIDOCAINE HYDROCHLORIDE 50 MG: 20 INJECTION, SOLUTION EPIDURAL; INFILTRATION; INTRACAUDAL; PERINEURAL at 08:01

## 2022-01-03 RX ADMIN — PENICILLIN G POTASSIUM 2 MILLION UNITS: 5000000 INJECTION, POWDER, FOR SOLUTION INTRAMUSCULAR; INTRAVENOUS at 09:01

## 2022-01-03 RX ADMIN — ZINC SULFATE 220 MG (50 MG) CAPSULE 220 MG: CAPSULE at 12:01

## 2022-01-03 RX ADMIN — FAMOTIDINE 20 MG: 20 TABLET, FILM COATED ORAL at 09:01

## 2022-01-03 RX ADMIN — SODIUM BICARBONATE: 84 INJECTION, SOLUTION INTRAVENOUS at 03:01

## 2022-01-03 RX ADMIN — PIPERACILLIN AND TAZOBACTAM 4.5 G: 4; .5 INJECTION, POWDER, FOR SOLUTION INTRAVENOUS at 12:01

## 2022-01-03 RX ADMIN — SODIUM CHLORIDE, POTASSIUM CHLORIDE, SODIUM LACTATE AND CALCIUM CHLORIDE: 600; 310; 30; 20 INJECTION, SOLUTION INTRAVENOUS at 07:01

## 2022-01-03 RX ADMIN — FAMOTIDINE 20 MG: 20 TABLET, FILM COATED ORAL at 12:01

## 2022-01-03 RX ADMIN — HEPARIN SODIUM 5000 UNITS: 5000 INJECTION INTRAVENOUS; SUBCUTANEOUS at 02:01

## 2022-01-03 RX ADMIN — SODIUM BICARBONATE 50 MEQ: 84 INJECTION, SOLUTION INTRAVENOUS at 02:01

## 2022-01-03 RX ADMIN — FENTANYL CITRATE 100 MCG: 50 INJECTION INTRAMUSCULAR; INTRAVENOUS at 08:01

## 2022-01-03 RX ADMIN — PENICILLIN G POTASSIUM 2 MILLION UNITS: 5000000 INJECTION, POWDER, FOR SOLUTION INTRAMUSCULAR; INTRAVENOUS at 05:01

## 2022-01-03 RX ADMIN — CALCIUM GLUCONATE 1 G: 98 INJECTION, SOLUTION INTRAVENOUS at 02:01

## 2022-01-03 RX ADMIN — HEPARIN SODIUM 5000 UNITS: 5000 INJECTION INTRAVENOUS; SUBCUTANEOUS at 09:01

## 2022-01-03 RX ADMIN — CLINDAMYCIN IN 5 PERCENT DEXTROSE 900 MG: 18 INJECTION, SOLUTION INTRAVENOUS at 03:01

## 2022-01-03 RX ADMIN — PROPOFOL 170 MG: 10 INJECTION, EMULSION INTRAVENOUS at 08:01

## 2022-01-03 RX ADMIN — PIPERACILLIN AND TAZOBACTAM 4.5 G: 4; .5 INJECTION, POWDER, FOR SOLUTION INTRAVENOUS at 04:01

## 2022-01-03 RX ADMIN — OXYCODONE HYDROCHLORIDE AND ACETAMINOPHEN 500 MG: 500 TABLET ORAL at 12:01

## 2022-01-03 RX ADMIN — Medication: at 04:01

## 2022-01-03 RX ADMIN — CLINDAMYCIN IN 5 PERCENT DEXTROSE 900 MG: 18 INJECTION, SOLUTION INTRAVENOUS at 09:01

## 2022-01-03 RX ADMIN — CEFAZOLIN 2 G: 1 INJECTION, POWDER, FOR SOLUTION INTRAMUSCULAR; INTRAVENOUS; PARENTERAL at 08:01

## 2022-01-03 RX ADMIN — Medication 1000 UNITS: at 12:01

## 2022-01-03 RX ADMIN — CLINDAMYCIN IN 5 PERCENT DEXTROSE 900 MG: 18 INJECTION, SOLUTION INTRAVENOUS at 12:01

## 2022-01-03 RX ADMIN — SODIUM ZIRCONIUM CYCLOSILICATE 10 G: 10 POWDER, FOR SUSPENSION ORAL at 02:01

## 2022-01-03 RX ADMIN — ONDANSETRON 4 MG: 2 INJECTION INTRAMUSCULAR; INTRAVENOUS at 08:01

## 2022-01-03 NOTE — CONSULTS
[address]  Nephrology  Consult Note    Patient Name: Flor Kelley  MRN: [id number]  Admission Date: 12/31/2021  Hospital Length of Stay: 3 days  Attending Provider: Jairo Anne MD   Primary Care Physician: Primary Doctor No  Principal Problem:Necrotizing fasciitis    Consults  Subjective:     HPI: Ms. Kelley has ARF and presented 12-31 with necrotizing fasciitis. She had further debridement today, bess cath placed and she's oliguric at present.    Past Medical History:   Diagnosis Date    Hypertension        Past Surgical History:   Procedure Laterality Date    DEBRIDEMENT OF LOWER EXTREMITY Left 1/1/2022    Procedure: DEBRIDEMENT, LEFT LOWER EXTREMITY/Groin/Pubis;  Surgeon: Brandyn Murrieta DO;  Location: [facility];  Service: General;  Laterality: Left;    NO PAST SURGERIES         Review of patient's allergies indicates:  No Known Allergies  Current Facility-Administered Medications   Medication Frequency    acetaminophen tablet 1,000 mg Q6H PRN    ascorbic acid (vitamin C) tablet 500 mg Daily    bisacodyL EC tablet 10 mg Daily PRN    clindamycin in D5W 900 mg/50 mL IVPB 900 mg Q8H    dextromethorphan-guaiFENesin  mg/5 ml liquid 10 mL Q6H PRN    famotidine tablet 20 mg BID    heparin (porcine) injection 5,000 Units Q8H    naloxone 0.4 mg/mL injection 0.02 mg PRN    ondansetron injection 4 mg Q6H PRN    ondansetron injection 8 mg Q6H PRN    oxyCODONE immediate release tablet 5 mg Q4H PRN    [START ON 1/4/2022] piperacillin-tazobactam (ZOSYN) 4.5 g in dextrose 5 % in water (D5W) 5 % 100 mL IVPB (MB+) Q12H    prochlorperazine injection Soln 5 mg Q6H PRN    simethicone chewable tablet 80 mg TID PRN    sodium bicarbonate 150 mEq in dextrose 5 % 1,000 mL infusion Continuous    vitamin D 1000 units tablet 1,000 Units Daily     Family History     Problem Relation (Age of Onset)    Hypertension Mother        Tobacco Use    Smoking status: Current Some Day  Smoker    Smokeless tobacco: Never Used   Substance and Sexual Activity    Alcohol use: Not Currently    Drug use: Never    Sexual activity: Not Currently     Review of Systems  Objective:     Vital Signs (Most Recent):  Temp: 98.2 °F (36.8 °C) (01/03/22 1145)  Pulse: 104 (01/03/22 1145)  Resp: 16 (01/03/22 1225)  BP: 137/88 (01/03/22 1145)  SpO2: 98 % (01/03/22 1145)  O2 Device (Oxygen Therapy): room air (01/03/22 1145) Vital Signs (24h Range):  Temp:  [97.3 °F (36.3 °C)-98.8 °F (37.1 °C)] 98.2 °F (36.8 °C)  Pulse:  [] 104  Resp:  [14-26] 16  SpO2:  [96 %-100 %] 98 %  BP: (123-166)/(83-96) 137/88     Weight: 82.4 kg (181 lb 10.5 oz) (01/03/22 0440)  Body mass index is 35.48 kg/m².  Body surface area is 1.87 meters squared.    No intake/output data recorded.    Physical Exam Alert now post anesthesia. Chest clear. Systolic bp 110.     Significant Labs:  BMP:   Recent Labs   Lab 01/03/22  0330   GLU 71*   *   K 5.4*   CL 90*   CO2 14*   BUN 65*  65*   CREATININE 6.53*  6.53*   CALCIUM 7.2*     CBC:   Recent Labs   Lab 01/03/22  0330 01/03/22  0330 01/03/22  1022   WBC 16.94*  --   --    RBC 4.30  --   --    HGB 11.3*   < > 10.0*   HCT 31.6*   < > 29.1*   *  --   --    MCV 73.5*  --   --    MCH 26.3*  --   --    MCHC 35.8  --   --     < > = values in this interval not displayed.     Microbiology Results (last 7 days)     Procedure Component Value Units Date/Time    Culture, Wound [[id number]]  (Abnormal)  (Susceptibility) Collected: 01/01/22 1200    Order Status: Completed Specimen: Wound from Thigh, Left Updated: 01/03/22 1001     Culture, Wound/Abscess Heavy Growth Methicillin resistant Staphylococcus aureus    Culture, Genital [[id number]]  (Abnormal)  (Susceptibility) Collected: 01/01/22 1342    Order Status: Completed Specimen: Genital from Labia Updated: 01/03/22 0959     Culture, Genital Heavy Growth Methicillin resistant Staphylococcus aureus    Blood Culture #1 [[id number]] Collected:  12/31/21 1942    Order Status: Completed Specimen: Blood from Peripheral, Antecubital, Right Updated: 01/03/22 0758     Culture, Blood No Growth At 48 Hours    Blood Culture #2 [[id number]] Collected: 12/31/21 1943    Order Status: Completed Specimen: Blood from Peripheral, Forearm, Right Updated: 01/03/22 0758     Culture, Blood No Growth At 48 Hours    Culture, Anaerobe [[id number]] Collected: 01/01/22 1339    Order Status: Resulted Specimen: Abscess from Thigh, Left Updated: 01/01/22 1339    Culture, Anaerobe [[id number]] Collected: 01/01/22 1339    Order Status: Canceled Specimen: Abscess from Thigh, Left Updated: 01/01/22 1339    Culture, Anaerobe [[id number]] Collected: 01/01/22 1125    Order Status: Resulted Specimen: Wound from Groin Updated: 01/01/22 1215    Culture, Wound [[id number]] Collected: 01/01/22 1125    Order Status: Canceled Specimen: Wound from Groin Updated: 01/01/22 1215    Culture, Anaerobe [[id number]]     Order Status: Canceled Specimen: Abscess from Thigh, Left         All labs within the past 24 hours have been reviewed.    Significant Imaging:  Labs: Reviewed    Assessment/Plan:     Active Diagnoses:    Diagnosis Date Noted POA    PRINCIPAL PROBLEM:  Necrotizing fasciitis [M72.6]  Yes    ROSHAN (acute kidney injury) [N17.9] 01/03/2022 Yes    Encephalopathy, metabolic [G93.41] 01/03/2022 No    Cellulitis [L03.90] 01/01/2022 Yes    COVID-19 [U07.1] 01/01/2022 Yes    Hypertension [I10]  Yes      Problems Resolved During this Admission:       She'll continue with iv fluids and we'll reassess tomorrow. She will require dialysis If she remains oliguric.    Thank you for your consult. I will follow-up with patient. Please contact us if you have any additional questions.    Ronaldo Kinsey MD  Nephrology  [address]

## 2022-01-03 NOTE — PLAN OF CARE
Problem: Adult Inpatient Plan of Care  Goal: Plan of Care Review  Outcome: Ongoing, Progressing  Goal: Patient-Specific Goal (Individualized)  Outcome: Ongoing, Progressing  Goal: Absence of Hospital-Acquired Illness or Injury  Outcome: Ongoing, Progressing  Goal: Optimal Comfort and Wellbeing  Outcome: Ongoing, Progressing  Goal: Readiness for Transition of Care  Outcome: Ongoing, Progressing     Problem: Bariatric Environmental Safety  Goal: Safety Maintained with Care  Outcome: Ongoing, Progressing     Problem: Infection  Goal: Absence of Infection Signs and Symptoms  Outcome: Ongoing, Progressing

## 2022-01-03 NOTE — PROGRESS NOTES
[facility] - Periop Services  Wound Care    Patient Name:  Flor Kelley   MRN:  [id number]  Date: 1/3/2022  Diagnosis: Necrotizing fasciitis    History:     Past Medical History:   Diagnosis Date    Hypertension        Social History     Socioeconomic History    Marital status: Single   Tobacco Use    Smoking status: Current Some Day Smoker    Smokeless tobacco: Never Used   Substance and Sexual Activity    Alcohol use: Not Currently    Drug use: Never    Sexual activity: Not Currently       Precautions:     Allergies as of 12/31/2021    (No Known Allergies)       WOC Assessment Details/Treatment        01/03/22 1101   WOCN Assessment   WOCN Total Time (mins) 15   Visit Date 01/03/22   Visit Time 1101   Consult Type New   WOCN Speciality Wound   Wound surgical;cellulitis   Intervention chart review;team conference     Wound is being followed by surgeon - multiple debridements done - last one today 1/3/21. Refer to surgeon's orders for dressing changes.     I will sign off. Please reconsult per surgery team or once surgery signs off. Thank you.        01/03/2022

## 2022-01-03 NOTE — OR NURSING
1035 received care of pt in OR for recovery d/t covid + status.    1100 pt arouses to repeated verbal stimulation. Was advised at handoff that pt was very lethargic.    1120 Dr Brand at bedside, states pt at baseline; is on pca pump.    1125 recovery complete, return to 669    1140 report given to KAILEE Rivera rn. 98.2; 16; 107; 137/88; 100% room air. States pt difficult to arouse prior to surgery d/t pca pump. I reported pt difficult to arouse in recovery; spoke with md; pt at baseline.

## 2022-01-03 NOTE — NURSING
Taken via bed to OR. PCA stopped and off. Pt very drowsy. Arouses but drifts to sleep in mmid conversation. O2 sats 100% on room air resp rate 14. Reported to OR nurse for transport.

## 2022-01-03 NOTE — OP NOTE
[facility] [place] - Periop Services  Surgery Department  Operative Note    SUMMARY     Date of Procedure: 1/3/2022     Procedure: Procedure(s) (LRB):  DEBRIDEMENT, WOUND (Left)  DEBRIDEMENT, LOWER EXTREMITY (Left) \  Repair of Femoral Venotomy    Surgeon(s) and Role:  Panel 1:     * Brandyn Murrieta, DO - Primary  Panel 2:     * Brandyn Murrieta DO - Primary    Assisting Surgeon: None    Pre-Operative Diagnosis: Necrotizing fasciitis [M72.6]    Post-Operative Diagnosis: Post-Op Diagnosis Codes:     * Necrotizing fasciitis [M72.6]    Anesthesia: General    Operative Findings (including complications, if any):  Necrotizing fasciitis extending to the right labia and right pubis.  Also spreading to left medial thigh inferiorly; greater saphenous vein sheared off of left common femoral vein, repaired with 5-0 Prolene suture    Description of Technical Procedures:  The patient was taken the operating room and placed on the operating table in the lithotomy position.  Patient underwent general anesthesia per the anesthesia team.  The bilateral groin and buttock and thighs were prepped and draped in usual sterile fashion.  Steele catheter was reinserted.  We found increased skin necrosis extending to the right pubis and labia with necrotic subcutaneous tissue extending down to the fascia.  We debrided to healthy skin, subcutaneous tissue and muscle with electrocautery on the right side.  We also had extending incision on the left medial thigh and down into the left buttock, taken down necrotic subcutaneous tissue down to the fascia.  The amount of tissue taken on the right measured 3 by 8 by 3 cm; amount of tissue on the left measured 2 by 12 x 3 cm.  There was some bleeding coming from small area which 1st look like a clot.  As we dissected around the area we found that the greater saphenous vein had sheared off of the common femoral vein.  We dissected around the femoral vein both proximally and distally in inferiorly and  placed vessel loops around all branches to obtain control.  There was lot of edema in the dissection took an additional 60 minutes.  I will add a 22 modifier to the case.  We used a side-biting DeBakey clamp and clamped over the defect and then performed a vein ostomy closure with a 5-0 Prolene in a running fashion.  Clamp was released, vessel loops released and we obtained hemostasis.  We irrigated the whole wound cavity with Dakin solution and then packed with Kerlix gauze, 4 x 4, abdominal pad and tape.  Patient was awakened, extubated taken back to the PACU in stable condition      Estimated Blood Loss (EBL): 150 mL           Implants: * No implants in log *    Specimens:   Specimen (24h ago, onward)            None                  Condition: Good    Disposition: PACU - hemodynamically stable.    Attestation: I was present and scrubbed for the entire procedure.

## 2022-01-03 NOTE — PLAN OF CARE
[facility] - [address] [place], [place] [address]  Initial Discharge Assessment       Primary Care Provider: Primary Doctor No    Admission Diagnosis: Cellulitis and abscess of leg [L03.119, L02.419]    Admission Date: 12/31/2021  Expected Discharge Date:     Discharge Barriers Identified: None    Payor: /     Extended Emergency Contact Information  Primary Emergency Contact: Ibrahima Lewis  Mobile Phone: [phone]  Relation: Other  Preferred language: English   needed? No    Discharge Plan A: Home with family  Discharge Plan B: Home with family      [facility] [address] [place] [address] [address] [place], [place] [address] [address] [place] [id number]  Phone: [phone] Fax: [phone]      Initial Assessment (most recent)     Adult Discharge Assessment - 01/03/22 1319        Discharge Assessment    Assessment Type Discharge Planning Assessment     Source of Information patient     Lives With spouse     Do you expect to return to your current living situation? Yes     Current cognitive status: Alert/Oriented     Equipment Currently Used at Home none     Do you currently have service(s) that help you manage your care at home? No     Discharge Plan A Home with family     Discharge Plan B Home with family     DME Needed Upon Discharge  none     Discharge Plan discussed with: Patient     Discharge Barriers Identified None        Relationship/Environment    Name(s) of Who Lives With Patient ibrahima lewis, spouse                 SS spoke with pt, states she lives at [facility] with her spouse Ibrahima Lewis. Not current with  and uses no DME. Plan at d/c is to return to [facility] with spouse. SS following for d/c needs.

## 2022-01-03 NOTE — SUBJECTIVE & OBJECTIVE
Interval History:     Pt is confused today, however is easily directed  Will d/c PCA pump  Her Cr also jumped significantly , likely responsible for her encephalopathy    Will give calcium gluconate, bicarb and sodium zirconate for hyperkalemia, will consult renal as well for sudden rise in Cr.     Review of Systems   Constitutional: Positive for fatigue.   HENT: Negative.    Respiratory: Negative.    Cardiovascular: Negative.    Genitourinary: Positive for pelvic pain.   Psychiatric/Behavioral: Positive for confusion.     Objective:     Vital Signs (Most Recent):  Temp: 97.3 °F (36.3 °C) (01/03/22 1033)  Pulse: 104 (01/03/22 1125)  Resp: 16 (01/03/22 1225)  BP: (!) 133/96 (01/03/22 1125)  SpO2: 100 % (01/03/22 1125) Vital Signs (24h Range):  Temp:  [97.3 °F (36.3 °C)-98.8 °F (37.1 °C)] 97.3 °F (36.3 °C)  Pulse:  [] 104  Resp:  [14-26] 16  SpO2:  [96 %-100 %] 100 %  BP: (123-166)/(83-96) 133/96     Weight: 82.4 kg (181 lb 10.5 oz)  Body mass index is 35.48 kg/m².    Intake/Output Summary (Last 24 hours) at 1/3/2022 1347  Last data filed at 1/3/2022 1125  Gross per 24 hour   Intake 100 ml   Output 230 ml   Net -130 ml      Physical Exam  Constitutional:       General: She is not in acute distress.     Appearance: Normal appearance.   HENT:      Head: Normocephalic.   Cardiovascular:      Rate and Rhythm: Normal rate.   Pulmonary:      Effort: Pulmonary effort is normal. No respiratory distress.   Abdominal:      General: There is no distension.      Tenderness: There is no abdominal tenderness.   Musculoskeletal:         General: Normal range of motion.      Comments: Dressing to pubic area and left thigh clean and intact   Skin:     General: Skin is warm.      Coloration: Skin is not jaundiced.   Neurological:      General: No focal deficit present.      Mental Status: She is alert. She is disoriented.      Cranial Nerves: No cranial nerve deficit.         Significant Labs: All pertinent labs within the past  24 hours have been reviewed.    Significant Imaging: I have reviewed all pertinent imaging results/findings within the past 24 hours.

## 2022-01-03 NOTE — PROGRESS NOTES
[facility] - [facility]  Progress Note    Patient Name: Flor Kelley  MRN: [id number]  Patient Class: IP- Inpatient   Admission Date: 12/31/2021  Length of Stay: 3 days  Attending Physician: Jairo Anne MD  Primary Care Provider: Primary Doctor No        Subjective:     Principal Problem:Necrotizing fasciitis        HPI:  42 yo F presents to the ED with worsening LLE pain and swelling.  She states that she has not had any trauma to LLE but has noticed redness and swelling worsening over the past several days.  She has not had F/C but states that area has become warm and tender.  CT done in ED showed small abscess only.  Lactic acid 3.2 but repeat 1.8.  She has elevated BP and says she has not been able to afford any meds and that she has not seen a PCP since her diagnosis of HTN.  Her creat is 2.8 and is most likely chronic.  She has marked pronteinuria.      Patient has had a dry cough and has lost her appetitie in the last two days.  She has not had COVID vaccinations and she did attend a [employer] party recently and she is now COVID positive.  She is not hypoxic or SOB at present.  She is currently on her menstrual cycle and urine pregnancy is negative.        Overview/Hospital Course:  No notes on file    Interval History:     Pt is confused today, however is easily directed  Will d/c PCA pump  Her Cr also jumped significantly , likely responsible for her encephalopathy    Will give calcium gluconate, bicarb and sodium zirconate for hyperkalemia, will consult renal as well for sudden rise in Cr.     Review of Systems   Constitutional: Positive for fatigue.   HENT: Negative.    Respiratory: Negative.    Cardiovascular: Negative.    Genitourinary: Positive for pelvic pain.   Psychiatric/Behavioral: Positive for confusion.     Objective:     Vital Signs (Most Recent):  Temp: 97.3 °F (36.3 °C) (01/03/22 1033)  Pulse: 104 (01/03/22 1125)  Resp: 16 (01/03/22 1225)  BP: (!)  133/96 (01/03/22 1125)  SpO2: 100 % (01/03/22 1125) Vital Signs (24h Range):  Temp:  [97.3 °F (36.3 °C)-98.8 °F (37.1 °C)] 97.3 °F (36.3 °C)  Pulse:  [] 104  Resp:  [14-26] 16  SpO2:  [96 %-100 %] 100 %  BP: (123-166)/(83-96) 133/96     Weight: 82.4 kg (181 lb 10.5 oz)  Body mass index is 35.48 kg/m².    Intake/Output Summary (Last 24 hours) at 1/3/2022 1347  Last data filed at 1/3/2022 1125  Gross per 24 hour   Intake 100 ml   Output 230 ml   Net -130 ml      Physical Exam  Constitutional:       General: She is not in acute distress.     Appearance: Normal appearance.   HENT:      Head: Normocephalic.   Cardiovascular:      Rate and Rhythm: Normal rate.   Pulmonary:      Effort: Pulmonary effort is normal. No respiratory distress.   Abdominal:      General: There is no distension.      Tenderness: There is no abdominal tenderness.   Musculoskeletal:         General: Normal range of motion.      Comments: Dressing to pubic area and left thigh clean and intact   Skin:     General: Skin is warm.      Coloration: Skin is not jaundiced.   Neurological:      General: No focal deficit present.      Mental Status: She is alert. She is disoriented.      Cranial Nerves: No cranial nerve deficit.         Significant Labs: All pertinent labs within the past 24 hours have been reviewed.    Significant Imaging: I have reviewed all pertinent imaging results/findings within the past 24 hours.      Assessment/Plan:      * Necrotizing fasciitis    OR 1/1/22, 1/3/22    Vanc, zosyn and clindamycin    ID consulted          Encephalopathy, metabolic  Likely d/t metabolic derangements  D/c PCA pump.   No focal neuroligcal deficit, easily directed       ROSHAN (acute kidney injury)  Acidoses, hyperkalemia  Start bicarb drip  Calcium gluconate and sodium zirconate for hyperkalemia    Consult nephrology       Hypertension  Hold any therapy d/t sepsis      COVID-19  DVT Ppx and H2 blockers  Monitor for oxygen requirement  No steroids as  she is asymptomatic  S/p bam therapy      Pharmacy consulted for monoclonal ab.      Cellulitis  Continue IV zosyn and IV vancomycin, added clindamycin by Dr Murrieta  Follow blood cultures  Being taken to the OR 1/1/22, 1/3/22  Has developed necrotizing fascitis   Consult ID for abx management  Consult pharmacy for vanc and zosyn dosing.           VTE Risk Mitigation (From admission, onward)         Ordered     Place sequential compression device  Until discontinued         01/01/22 1548     heparin (porcine) injection 5,000 Units  Every 8 hours         01/01/22 0628                Discharge Planning   JOSÉ MIGUEL:      Code Status: Full Code   Is the patient medically ready for discharge?:     Reason for patient still in hospital (select all that apply): Treatment  Discharge Plan A: Home with family                  Rehmat PAUL Anne MD  Department of Hospital Medicine   [address]

## 2022-01-03 NOTE — ASSESSMENT & PLAN NOTE
Likely d/t metabolic derangements  D/c PCA pump.   No focal neuroligcal deficit, easily directed

## 2022-01-03 NOTE — CONSULTS
INFECTIOUS DISEASE CONSULT NOTE   Admit Date: 12/31/2021  CONSULT FOR :  Antibiotic assistance  Chief Complaint:  Necrotizing fasciitis    HPI:      History obtained from review of the patient's records as she was confused.  She presented to hospital 3 days ago with worsening left thigh pain extending up to the groin.  Started empirically on vancomycin and Zosyn and following day was taken to the operating room for I&D and found to have necrotizing fasciitis extending from left thigh to bilateral labia.  Clindamycin was added postoperatively.  Yesterday there was some no worsening of the necrosis to groin area and so today which she was taking back to the operating room for repeat I&D.  Today she has been poorly responsive and it was felt to be multifactorial including medications as she had been on PCA pump since her 1st surgery.  Additionally the patient's creatinine jumped from 1.2 yesterday to 6.5 today and she has been oliguric for the entire day.  Of note patient also tested positive for COVID on admission.  I am asked to assist with antimicrobial management.    ROS:      Unable to obtain as the patient is confused    PMHx:      Past Medical History:   Diagnosis Date    Hypertension         PMSx:      Past Surgical History:   Procedure Laterality Date    DEBRIDEMENT OF LOWER EXTREMITY Left 1/1/2022    Procedure: DEBRIDEMENT, LEFT LOWER EXTREMITY/Groin/Pubis;  Surgeon: Brandyn Murrieta DO;  Location: [facility];  Service: General;  Laterality: Left;    NO PAST SURGERIES          Social Hx:      Social History     Socioeconomic History    Marital status: Single   Tobacco Use    Smoking status: Current Some Day Smoker    Smokeless tobacco: Never Used   Substance and Sexual Activity    Alcohol use: Not Currently    Drug use: Never    Sexual activity: Not Currently        Family Hx:      Family History   Problem Relation Age of Onset    Hypertension Mother     `  Review of patient's  allergies indicates:  No Known Allergies    Medications:      Current Facility-Administered Medications   Medication    acetaminophen tablet 1,000 mg    ascorbic acid (vitamin C) tablet 500 mg    bisacodyL EC tablet 10 mg    clindamycin in D5W 900 mg/50 mL IVPB 900 mg    dextromethorphan-guaiFENesin  mg/5 ml liquid 10 mL    famotidine tablet 20 mg    heparin (porcine) injection 5,000 Units    naloxone 0.4 mg/mL injection 0.02 mg    ondansetron injection 4 mg    ondansetron injection 8 mg    oxyCODONE immediate release tablet 5 mg    [START ON 1/4/2022] piperacillin-tazobactam (ZOSYN) 4.5 g in dextrose 5 % in water (D5W) 5 % 100 mL IVPB (MB+)    prochlorperazine injection Soln 5 mg    simethicone chewable tablet 80 mg    sodium bicarbonate 150 mEq in dextrose 5 % 1,000 mL infusion    vitamin D 1000 units tablet 1,000 Units       VITALS:      Vital Signs Range (Last 24H):  Temp:  [97.3 °F (36.3 °C)-98.8 °F (37.1 °C)]   Pulse:  []   Resp:  [14-22]   BP: (123-166)/(83-96)   SpO2:  [98 %-100 %]     I & O (Last 24H):    Intake/Output Summary (Last 24 hours) at 1/3/2022 1610  Last data filed at 1/3/2022 1125  Gross per 24 hour   Intake 100 ml   Output 230 ml   Net -130 ml       Physical Exam   Constitutional: Pt is very drowsy but arousable, confused. Appears well-developed and obese.   Head: Normocephalic and atraumatic.   Eyes, nose and throat:  Pale moist mucosa, moderately icteric, acyanotic, SHARLENE, there appears to be at least mild proptosis bilaterally, no obvious oral exudates but her a mouth appears dry  Neck: Neck supple, submandibular lymphadenopathy present, no thyroid gland enlargement   Cardiovascular: Normal rate and regular rhythm.  S1 and S2 appreciated by ascultation, no murmurs  Pulmonary/Chest: Effort normal, no crepitations or wheezes auscultated   Abdomen:  Soft, not distended, decreased bowel sounds. Soft. Non-tender.  Musculoskeletal:  Bandages overall groin and left thigh  surgical wounds  Neurological: Pt is lethargic but arousable.  No obvious focal deficits.  Extremities: No edema. No clubbing or cyanosis  Skin: Warm and dry. No rashes.    Laboratory Data:  Reviewed and noted in plan where applicable- Please see chart for full laboratory data.    No results for input(s): CPK, CPKMB, TROPONINI, MB in the last 24 hours. No results for input(s): POCTGLUCOSE in the last 24 hours.     Lab Results   Component Value Date    INR 1.13 (H) 01/01/2022       Lab Results   Component Value Date    WBC 16.94 (H) 01/03/2022    HGB 10.0 (L) 01/03/2022    HCT 29.1 (L) 01/03/2022    MCV 73.5 (L) 01/03/2022     (L) 01/03/2022       BMP  Recent Labs   Lab 01/03/22  0330   GLU 71*   *   K 5.4*   CL 90*   CO2 14*   BUN 65*  65*   CREATININE 6.53*  6.53*   CALCIUM 7.2*     Microbiology Results (last 7 days)     Procedure Component Value Units Date/Time    Culture, Wound [[id number]]  (Abnormal)  (Susceptibility) Collected: 01/01/22 1200    Order Status: Completed Specimen: Wound from Thigh, Left Updated: 01/03/22 1001     Culture, Wound/Abscess Heavy Growth Methicillin resistant Staphylococcus aureus    Culture, Genital [[id number]]  (Abnormal)  (Susceptibility) Collected: 01/01/22 1342    Order Status: Completed Specimen: Genital from Labia Updated: 01/03/22 0959     Culture, Genital Heavy Growth Methicillin resistant Staphylococcus aureus    Blood Culture #1 [[id number]] Collected: 12/31/21 1942    Order Status: Completed Specimen: Blood from Peripheral, Antecubital, Right Updated: 01/03/22 0758     Culture, Blood No Growth At 48 Hours    Blood Culture #2 [[id number]] Collected: 12/31/21 1943    Order Status: Completed Specimen: Blood from Peripheral, Forearm, Right Updated: 01/03/22 0758     Culture, Blood No Growth At 48 Hours    Culture, Anaerobe [[id number]] Collected: 01/01/22 1339    Order Status: Resulted Specimen: Abscess from Thigh, Left Updated: 01/01/22 1339    Culture, Anaerobe  [[id number]] Collected: 01/01/22 1339    Order Status: Canceled Specimen: Abscess from Thigh, Left Updated: 01/01/22 1339    Culture, Anaerobe [[id number]] Collected: 01/01/22 1125    Order Status: Resulted Specimen: Wound from Groin Updated: 01/01/22 1215    Culture, Wound [[id number]] Collected: 01/01/22 1125    Order Status: Canceled Specimen: Wound from Groin Updated: 01/01/22 1215    Culture, Anaerobe [[id number]]     Order Status: Canceled Specimen: Abscess from Thigh, Left             Radiology:  Reviewed and noted in plan where applicable- Please see chart for full radiology data.      ASSESSMENT/PLAN:     ACTIVE PROBLEMS:    Patient Active Problem List   Diagnosis    Cellulitis    COVID-19    Hypertension    Necrotizing fasciitis    ROSHAN (acute kidney injury)    Encephalopathy, metabolic       ASSESSMENT:  1. Necrotizing fasciitis, so far only MRSA (clindamycin susceptible) isolated from tissue cultures  2. Acute renal failure, oliguric, likely multifactorial no including medications  3. Jaundice with mild transaminitis, possibly related to the sepsis  4. COVID19 positive, s/p monoclonal antibody Rx      PLAN:  1. Discontinue vancomycin  2. Agree with clindamycin  3. Switch from Zosyn to penicillin G2 million units IV q.4 hours  4. HIV and hepatitis serologies  5. Ultrasound of liver and also kidneys    Thank you very much for the consult.  Will follow.  Prognosis guarded  D/W Dr Anne

## 2022-01-03 NOTE — TRANSFER OF CARE
Anesthesia Transfer of Care Note    Patient: Flor Kelley    Procedure(s) Performed: Procedure(s) (LRB):  DEBRIDEMENT, WOUND (Left)  DEBRIDEMENT, LOWER EXTREMITY (Left)    Patient location: PACU    Anesthesia Type: general    Transport from OR: Transported from OR on 6-10 L/min O2 by face mask with adequate spontaneous ventilation    Post pain: adequate analgesia    Post assessment: no apparent anesthetic complications    Post vital signs: stable    Level of consciousness: sedated    Nausea/Vomiting: no nausea/vomiting    Complications: none    Transfer of care protocol was followed      Last vitals:   Visit Vitals  /87   Pulse 105   Temp 36.3 °C (97.3 °F)   Resp 16   Ht 5' (1.524 m)   Wt 82.4 kg (181 lb 10.5 oz)   LMP 12/31/2021   SpO2 100%   Breastfeeding No   BMI 35.48 kg/m²

## 2022-01-03 NOTE — ASSESSMENT & PLAN NOTE
Continue IV zosyn and IV vancomycin, added clindamycin by Dr Murrieta  Follow blood cultures  Being taken to the OR 1/1/22, 1/3/22  Has developed necrotizing fascitis   Consult ID for abx management  Consult pharmacy for vanc and zosyn dosing.

## 2022-01-03 NOTE — PROGRESS NOTES
To the OR for debridement of left thigh/groin/pubis.    Risks and benefits explained with the patient including risks for infection, bleeding, injury to surrounding structures, hematoma/seroma formation with need for possible evacuation, possible open.  The patient verbalized understanding, agrees and wishes to proceed with surgery.

## 2022-01-03 NOTE — PROGRESS NOTES
Pharmacy holding vanc due to increase in creatinine. Random level ordered for 1/4/22 1900 before restarting.

## 2022-01-03 NOTE — ASSESSMENT & PLAN NOTE
Acidoses, hyperkalemia  Start bicarb drip  Calcium gluconate and sodium zirconate for hyperkalemia    Consult nephrology

## 2022-01-03 NOTE — ANESTHESIA PREPROCEDURE EVALUATION
01/03/2022  Flor Kelley is a 41 y.o., female.    Anesthesia Evaluation    I have reviewed the Patient Summary Reports.    I have reviewed the Nursing Notes. I have reviewed the NPO Status.   I have reviewed the Medications.     Review of Systems  Anesthesia Hx:  No problems with previous Anesthesia    Social:  Non-Smoker, No Alcohol Use    Hematology/Oncology:  Hematology Normal   Oncology Normal     EENT/Dental:EENT/Dental Normal   Cardiovascular:   Hypertension    Pulmonary:   COVID POSITIVE   Renal/:  Renal/ Normal     Hepatic/GI:  Hepatic/GI Normal    Musculoskeletal:   Nec. Fasciitis    Neurological:  Neurology Normal    Endocrine:  Endocrine Normal    Dermatological:  Skin Normal    Psych:  Psychiatric Normal           Physical Exam  General:  Obesity    Airway/Jaw/Neck:  Airway Findings: Mouth Opening: Normal Mallampati: II     Eyes/Ears/Nose:  Eyes/Ears/Nose Findings:     Chest/Lungs:  Chest/Lungs Findings:        Mental Status:  Mental Status Findings:  Cooperative, Alert and Oriented         Chemistry        Component Value Date/Time     (L) 01/02/2022 1019    K 4.4 01/02/2022 1019    CL 91 (L) 01/02/2022 1019    CO2 15 (L) 01/02/2022 1019    BUN 52 (H) 01/02/2022 1019    CREATININE 1.19 (H) 01/02/2022 1019     01/02/2022 1019        Component Value Date/Time    CALCIUM 7.6 (L) 01/02/2022 1019    ALKPHOS 66 01/02/2022 1019    AST 51 (H) 01/02/2022 1019    ALT 24 01/02/2022 1019    BILITOT 3.5 (H) 01/02/2022 1019    EGFRNONAA 53 (L) 01/02/2022 1019        Lab Results   Component Value Date    WBC 7.34 01/01/2022    RBC 5.76 (H) 01/01/2022    HGB 15.3 01/01/2022    MCV 76.6 (L) 01/01/2022    MCH 26.6 (L) 01/01/2022    MCHC 34.7 01/01/2022    RDW 16.3 (H) 01/01/2022     (L) 01/01/2022    MPV 12.7 (H) 12/31/2021    LYMPH 7.8 (L) 01/01/2022    LYMPH 0.57 (L) 01/01/2022    LYMPH  12 (L) 01/01/2022    MONO 14.2 (H) 01/01/2022    MONO 14 (H) 01/01/2022    EOS 0.00 01/01/2022    BASO 0.05 01/01/2022     No results found for this or any previous visit.      Anesthesia Plan  Type of Anesthesia, risks & benefits discussed:  Anesthesia Type:  general    Patient's Preference:   Plan Factors:          Intra-op Monitoring Plan: standard ASA monitors  Intra-op Monitoring Plan Comments:   Post Op Pain Control Plan: per primary service following discharge from PACU and multimodal analgesia  Post Op Pain Control Plan Comments:     Induction:   IV  Beta Blocker:  Patient is not currently on a Beta-Blocker (No further documentation required).       Informed Consent: Patient understands risks and agrees with Anesthesia plan.  Questions answered. Anesthesia consent signed with patient.  ASA Score: 3     Day of Surgery Review of History & Physical: I have interviewed and examined the patient. I have reviewed the patient's H&P dated:  There are no significant changes.          Ready For Surgery From Anesthesia Perspective.

## 2022-01-04 ENCOUNTER — ANESTHESIA EVENT (OUTPATIENT)
Dept: SURGERY | Facility: HOSPITAL | Age: 42
DRG: 463 | End: 2022-01-04

## 2022-01-04 ENCOUNTER — ANESTHESIA (OUTPATIENT)
Dept: SURGERY | Facility: HOSPITAL | Age: 42
DRG: 463 | End: 2022-01-04

## 2022-01-04 PROBLEM — A41.9 SEPSIS: Status: ACTIVE | Noted: 2022-01-04

## 2022-01-04 PROBLEM — B19.20 HEPATITIS C: Status: ACTIVE | Noted: 2022-01-04

## 2022-01-04 PROBLEM — L03.90 CELLULITIS: Status: RESOLVED | Noted: 2022-01-01 | Resolved: 2022-01-04

## 2022-01-04 PROBLEM — D62 ACUTE BLOOD LOSS ANEMIA: Status: ACTIVE | Noted: 2022-01-04

## 2022-01-04 LAB
ABO + RH BLD: NORMAL
ABO + RH BLD: NORMAL
ALBUMIN SERPL BCP-MCNC: 0.7 G/DL (ref 3.5–5)
ALBUMIN SERPL BCP-MCNC: 0.9 G/DL (ref 3.5–5)
ALBUMIN/GLOB SERPL: 0.2 {RATIO}
ALBUMIN/GLOB SERPL: 0.2 {RATIO}
ALP SERPL-CCNC: 133 U/L (ref 37–98)
ALP SERPL-CCNC: 73 U/L (ref 37–98)
ALT SERPL W P-5'-P-CCNC: 10 U/L (ref 13–56)
ALT SERPL W P-5'-P-CCNC: 9 U/L (ref 13–56)
ANION GAP SERPL CALCULATED.3IONS-SCNC: 23 MMOL/L (ref 7–16)
ANION GAP SERPL CALCULATED.3IONS-SCNC: 26 MMOL/L (ref 7–16)
ANISOCYTOSIS BLD QL SMEAR: ABNORMAL
AST SERPL W P-5'-P-CCNC: 40 U/L (ref 15–37)
AST SERPL W P-5'-P-CCNC: 41 U/L (ref 15–37)
BASOPHILS # BLD AUTO: 0.04 K/UL (ref 0–0.2)
BASOPHILS # BLD AUTO: 0.05 K/UL (ref 0–0.2)
BASOPHILS # BLD AUTO: 0.1 K/UL (ref 0–0.2)
BASOPHILS NFR BLD AUTO: 0.1 % (ref 0–1)
BASOPHILS NFR BLD AUTO: 0.1 % (ref 0–1)
BASOPHILS NFR BLD AUTO: 0.3 % (ref 0–1)
BILIRUB SERPL-MCNC: 2.5 MG/DL (ref 0–1.2)
BILIRUB SERPL-MCNC: 3.1 MG/DL (ref 0–1.2)
BLD PROD TYP BPU: NORMAL
BLD PROD TYP BPU: NORMAL
BLOOD UNIT EXPIRATION DATE: NORMAL
BLOOD UNIT EXPIRATION DATE: NORMAL
BLOOD UNIT TYPE CODE: 6200
BLOOD UNIT TYPE CODE: 6200
BUN SERPL-MCNC: 36 MG/DL (ref 7–18)
BUN SERPL-MCNC: 81 MG/DL (ref 7–18)
BUN/CREAT SERPL: 11 (ref 6–20)
BUN/CREAT SERPL: 11 (ref 6–20)
CA-I SERPL-MCNC: 0.88 MMOL/L (ref 1.15–1.35)
CALCIUM SERPL-MCNC: 7.1 MG/DL (ref 8.5–10.1)
CALCIUM SERPL-MCNC: 7.7 MG/DL (ref 8.5–10.1)
CHLORIDE SERPL-SCNC: 84 MMOL/L (ref 98–107)
CHLORIDE SERPL-SCNC: 92 MMOL/L (ref 98–107)
CK SERPL-CCNC: 611 U/L (ref 26–192)
CO2 SERPL-SCNC: 20 MMOL/L (ref 21–32)
CO2 SERPL-SCNC: 22 MMOL/L (ref 21–32)
CREAT SERPL-MCNC: 3.35 MG/DL (ref 0.55–1.02)
CREAT SERPL-MCNC: 7.44 MG/DL (ref 0.55–1.02)
CRENATED CELLS: ABNORMAL
CROSSMATCH INTERPRETATION: NORMAL
CROSSMATCH INTERPRETATION: NORMAL
DIFFERENTIAL METHOD BLD: ABNORMAL
DISPENSE STATUS: NORMAL
DISPENSE STATUS: NORMAL
EOSINOPHIL # BLD AUTO: 0 K/UL (ref 0–0.5)
EOSINOPHIL # BLD AUTO: 0 K/UL (ref 0–0.5)
EOSINOPHIL # BLD AUTO: 0.01 K/UL (ref 0–0.5)
EOSINOPHIL NFR BLD AUTO: 0 % (ref 1–4)
ERYTHROCYTE [DISTWIDTH] IN BLOOD BY AUTOMATED COUNT: 15.5 % (ref 11.5–14.5)
ERYTHROCYTE [DISTWIDTH] IN BLOOD BY AUTOMATED COUNT: 15.6 % (ref 11.5–14.5)
ERYTHROCYTE [DISTWIDTH] IN BLOOD BY AUTOMATED COUNT: 16.4 % (ref 11.5–14.5)
ESTROGEN SERPL-MCNC: NORMAL PG/ML
GLOBULIN SER-MCNC: 4.5 G/DL (ref 2–4)
GLOBULIN SER-MCNC: 4.9 G/DL (ref 2–4)
GLUCOSE SERPL-MCNC: 116 MG/DL (ref 74–106)
GLUCOSE SERPL-MCNC: 119 MG/DL (ref 74–106)
GLUCOSE SERPL-MCNC: 136 MG/DL (ref 70–105)
HCO3 UR-SCNC: 23.5 MMOL/L (ref 18–23)
HCT VFR BLD AUTO: 15.3 % (ref 38–47)
HCT VFR BLD AUTO: 15.8 % (ref 38–47)
HCT VFR BLD AUTO: 17.1 % (ref 38–47)
HCT VFR BLD AUTO: 20 % (ref 38–47)
HGB BLD-MCNC: 5.5 G/DL (ref 12–16)
HGB BLD-MCNC: 6 G/DL (ref 12–16)
HGB BLD-MCNC: 6.4 G/DL (ref 12–16)
HGB BLD-MCNC: 7 G/DL (ref 12–16)
HYPOCHROMIA BLD QL SMEAR: ABNORMAL
IMM GRANULOCYTES # BLD AUTO: 1.59 K/UL (ref 0–0.04)
IMM GRANULOCYTES # BLD AUTO: 2.14 K/UL (ref 0–0.04)
IMM GRANULOCYTES # BLD AUTO: 2.69 K/UL (ref 0–0.04)
IMM GRANULOCYTES NFR BLD: 4.4 % (ref 0–0.4)
IMM GRANULOCYTES NFR BLD: 5.5 % (ref 0–0.4)
IMM GRANULOCYTES NFR BLD: 5.9 % (ref 0–0.4)
INDIRECT COOMBS: NORMAL
LAB AP GROSS DESCRIPTION: NORMAL
LAB AP LABORATORY NOTES: NORMAL
LACTATE SERPL-SCNC: 1.9 MMOL/L (ref 0.4–2)
LACTATE SERPL-SCNC: 6.4 MMOL/L (ref 0.4–2)
LYMPHOCYTES # BLD AUTO: 1.22 K/UL (ref 1–4.8)
LYMPHOCYTES # BLD AUTO: 1.42 K/UL (ref 1–4.8)
LYMPHOCYTES # BLD AUTO: 1.5 K/UL (ref 1–4.8)
LYMPHOCYTES NFR BLD AUTO: 2.7 % (ref 27–41)
LYMPHOCYTES NFR BLD AUTO: 3.9 % (ref 27–41)
LYMPHOCYTES NFR BLD AUTO: 3.9 % (ref 27–41)
LYMPHOCYTES NFR BLD MANUAL: 4 % (ref 27–41)
LYMPHOCYTES NFR BLD MANUAL: 6 % (ref 27–41)
LYMPHOCYTES NFR BLD MANUAL: 6 % (ref 27–41)
MACROCYTES BLD QL SMEAR: ABNORMAL
MCH RBC QN AUTO: 26.2 PG (ref 27–31)
MCH RBC QN AUTO: 26.5 PG (ref 27–31)
MCH RBC QN AUTO: 26.6 PG (ref 27–31)
MCHC RBC AUTO-ENTMCNC: 35 G/DL (ref 32–36)
MCHC RBC AUTO-ENTMCNC: 35.9 G/DL (ref 32–36)
MCHC RBC AUTO-ENTMCNC: 38 G/DL (ref 32–36)
MCV RBC AUTO: 69.9 FL (ref 80–96)
MCV RBC AUTO: 73.9 FL (ref 80–96)
MCV RBC AUTO: 74.9 FL (ref 80–96)
METAMYELOCYTES NFR BLD MANUAL: 2 %
METAMYELOCYTES NFR BLD MANUAL: 4 %
MICROCYTES BLD QL SMEAR: ABNORMAL
MICROCYTES BLD QL SMEAR: ABNORMAL
MONOCYTES # BLD AUTO: 0.67 K/UL (ref 0–0.8)
MONOCYTES # BLD AUTO: 0.83 K/UL (ref 0–0.8)
MONOCYTES # BLD AUTO: 1.1 K/UL (ref 0–0.8)
MONOCYTES NFR BLD AUTO: 1.8 % (ref 2–6)
MONOCYTES NFR BLD AUTO: 2.1 % (ref 2–6)
MONOCYTES NFR BLD AUTO: 2.4 % (ref 2–6)
MONOCYTES NFR BLD MANUAL: 1 % (ref 2–6)
MONOCYTES NFR BLD MANUAL: 3 % (ref 2–6)
MONOCYTES NFR BLD MANUAL: 3 % (ref 2–6)
MPC BLD CALC-MCNC: 11 FL (ref 9.4–12.4)
MPC BLD CALC-MCNC: 11.1 FL (ref 9.4–12.4)
MPC BLD CALC-MCNC: 11.7 FL (ref 9.4–12.4)
NEUTROPHILS # BLD AUTO: 32.8 K/UL (ref 1.8–7.7)
NEUTROPHILS # BLD AUTO: 34.21 K/UL (ref 1.8–7.7)
NEUTROPHILS # BLD AUTO: 40.48 K/UL (ref 1.8–7.7)
NEUTROPHILS NFR BLD AUTO: 88.2 % (ref 53–65)
NEUTROPHILS NFR BLD AUTO: 88.9 % (ref 53–65)
NEUTROPHILS NFR BLD AUTO: 89.8 % (ref 53–65)
NEUTS BAND NFR BLD MANUAL: 21 % (ref 1–5)
NEUTS BAND NFR BLD MANUAL: 26 % (ref 1–5)
NEUTS BAND NFR BLD MANUAL: 31 % (ref 1–5)
NEUTS SEG NFR BLD MANUAL: 58 % (ref 50–62)
NEUTS SEG NFR BLD MANUAL: 65 % (ref 50–62)
NEUTS SEG NFR BLD MANUAL: 70 % (ref 50–62)
NRBC # BLD AUTO: 0.12 X10E3/UL
NRBC # BLD AUTO: 0.13 X10E3/UL
NRBC # BLD AUTO: 0.17 X10E3/UL
NRBC, AUTO (.00): 0.3 %
NRBC, AUTO (.00): 0.3 %
NRBC, AUTO (.00): 0.4 %
OVALOCYTES BLD QL SMEAR: ABNORMAL
PCO2 BLDA: 38 MMHG
PH SMN: 7.4 [PH]
PLATELET # BLD AUTO: 135 K/UL (ref 150–400)
PLATELET # BLD AUTO: 142 K/UL (ref 150–400)
PLATELET # BLD AUTO: 166 K/UL (ref 150–400)
PLATELET MORPHOLOGY: ABNORMAL
PO2 BLDA: 66 MMHG (ref 83–108)
POC A-ADO2: ABNORMAL MMHG
POC BASE EXCESS: -1.1 MMOL/L
POC SATURATED O2: 94.6 % (ref 95–98)
POTASSIUM SERPL-SCNC: 3.4 MMOL/L (ref 3.5–5.1)
POTASSIUM SERPL-SCNC: 5.1 MMOL/L (ref 3.5–5.1)
PROT SERPL-MCNC: 5.2 G/DL (ref 6.4–8.2)
PROT SERPL-MCNC: 5.8 G/DL (ref 6.4–8.2)
RBC # BLD AUTO: 2.07 M/UL (ref 4.2–5.4)
RBC # BLD AUTO: 2.26 M/UL (ref 4.2–5.4)
RBC # BLD AUTO: 2.67 M/UL (ref 4.2–5.4)
RH BLD: NORMAL
SODIUM SERPL-SCNC: 125 MMOL/L (ref 136–145)
SODIUM SERPL-SCNC: 134 MMOL/L (ref 136–145)
T3RU NFR SERPL: NORMAL %
TARGETS BLD QL SMEAR: ABNORMAL
UNIT NUMBER: NORMAL
UNIT NUMBER: NORMAL
WBC # BLD AUTO: 36.53 K/UL (ref 4.5–11)
WBC # BLD AUTO: 38.79 K/UL (ref 4.5–11)
WBC # BLD AUTO: 45.53 K/UL (ref 4.5–11)

## 2022-01-04 PROCEDURE — 36000706: Performed by: STUDENT IN AN ORGANIZED HEALTH CARE EDUCATION/TRAINING PROGRAM

## 2022-01-04 PROCEDURE — 63600175 PHARM REV CODE 636 W HCPCS: Performed by: STUDENT IN AN ORGANIZED HEALTH CARE EDUCATION/TRAINING PROGRAM

## 2022-01-04 PROCEDURE — 25000003 PHARM REV CODE 250: Performed by: INTERNAL MEDICINE

## 2022-01-04 PROCEDURE — 51798 US URINE CAPACITY MEASURE: CPT

## 2022-01-04 PROCEDURE — 83605 ASSAY OF LACTIC ACID: CPT | Performed by: NURSE PRACTITIONER

## 2022-01-04 PROCEDURE — 63600175 PHARM REV CODE 636 W HCPCS: Performed by: INTERNAL MEDICINE

## 2022-01-04 PROCEDURE — D9220A PRA ANESTHESIA: Mod: ,,, | Performed by: ANESTHESIOLOGY

## 2022-01-04 PROCEDURE — 36000707: Performed by: STUDENT IN AN ORGANIZED HEALTH CARE EDUCATION/TRAINING PROGRAM

## 2022-01-04 PROCEDURE — 86923 COMPATIBILITY TEST ELECTRIC: CPT | Mod: 91 | Performed by: INTERNAL MEDICINE

## 2022-01-04 PROCEDURE — 36415 COLL VENOUS BLD VENIPUNCTURE: CPT | Performed by: INTERNAL MEDICINE

## 2022-01-04 PROCEDURE — 63600175 PHARM REV CODE 636 W HCPCS: Performed by: NURSE ANESTHETIST, CERTIFIED REGISTERED

## 2022-01-04 PROCEDURE — D9220A PRA ANESTHESIA: ICD-10-PCS | Mod: ,,, | Performed by: ANESTHESIOLOGY

## 2022-01-04 PROCEDURE — 85014 HEMATOCRIT: CPT | Performed by: INTERNAL MEDICINE

## 2022-01-04 PROCEDURE — C1750 CATH, HEMODIALYSIS,LONG-TERM: HCPCS | Performed by: STUDENT IN AN ORGANIZED HEALTH CARE EDUCATION/TRAINING PROGRAM

## 2022-01-04 PROCEDURE — 36591 DRAW BLOOD OFF VENOUS DEVICE: CPT | Performed by: INTERNAL MEDICINE

## 2022-01-04 PROCEDURE — 80053 COMPREHEN METABOLIC PANEL: CPT | Performed by: INTERNAL MEDICINE

## 2022-01-04 PROCEDURE — 27000655: Performed by: ANESTHESIOLOGY

## 2022-01-04 PROCEDURE — 27201423 OPTIME MED/SURG SUP & DEVICES STERILE SUPPLY: Performed by: STUDENT IN AN ORGANIZED HEALTH CARE EDUCATION/TRAINING PROGRAM

## 2022-01-04 PROCEDURE — P9016 RBC LEUKOCYTES REDUCED: HCPCS | Performed by: INTERNAL MEDICINE

## 2022-01-04 PROCEDURE — 25000003 PHARM REV CODE 250: Performed by: STUDENT IN AN ORGANIZED HEALTH CARE EDUCATION/TRAINING PROGRAM

## 2022-01-04 PROCEDURE — C9113 INJ PANTOPRAZOLE SODIUM, VIA: HCPCS | Performed by: INTERNAL MEDICINE

## 2022-01-04 PROCEDURE — 27000221 HC OXYGEN, UP TO 24 HOURS

## 2022-01-04 PROCEDURE — 99255 PR INITIAL INPATIENT CONSULT,LEVL V: ICD-10-PCS | Mod: ,,, | Performed by: INTERNAL MEDICINE

## 2022-01-04 PROCEDURE — 99900035 HC TECH TIME PER 15 MIN (STAT)

## 2022-01-04 PROCEDURE — 99233 SBSQ HOSP IP/OBS HIGH 50: CPT | Mod: ,,, | Performed by: INTERNAL MEDICINE

## 2022-01-04 PROCEDURE — 36558 PR INSERT TUNNELED CV CATH W/O PORT OR PUMP: ICD-10-PCS | Mod: RT,,, | Performed by: STUDENT IN AN ORGANIZED HEALTH CARE EDUCATION/TRAINING PROGRAM

## 2022-01-04 PROCEDURE — 87040 BLOOD CULTURE FOR BACTERIA: CPT | Performed by: NURSE PRACTITIONER

## 2022-01-04 PROCEDURE — 82550 ASSAY OF CK (CPK): CPT | Performed by: NURSE PRACTITIONER

## 2022-01-04 PROCEDURE — 82803 BLOOD GASES ANY COMBINATION: CPT

## 2022-01-04 PROCEDURE — 77001 FLUOROGUIDE FOR VEIN DEVICE: CPT | Mod: 26,,, | Performed by: STUDENT IN AN ORGANIZED HEALTH CARE EDUCATION/TRAINING PROGRAM

## 2022-01-04 PROCEDURE — 82330 ASSAY OF CALCIUM: CPT | Performed by: NURSE PRACTITIONER

## 2022-01-04 PROCEDURE — 99233 PR SUBSEQUENT HOSPITAL CARE,LEVL III: ICD-10-PCS | Mod: ,,, | Performed by: INTERNAL MEDICINE

## 2022-01-04 PROCEDURE — 99255 IP/OBS CONSLTJ NEW/EST HI 80: CPT | Mod: ,,, | Performed by: INTERNAL MEDICINE

## 2022-01-04 PROCEDURE — 25000003 PHARM REV CODE 250

## 2022-01-04 PROCEDURE — 86850 RBC ANTIBODY SCREEN: CPT | Performed by: INTERNAL MEDICINE

## 2022-01-04 PROCEDURE — 36558 INSERT TUNNELED CV CATH: CPT | Mod: RT,,, | Performed by: STUDENT IN AN ORGANIZED HEALTH CARE EDUCATION/TRAINING PROGRAM

## 2022-01-04 PROCEDURE — 27000716 HC OXISENSOR PROBE, ANY SIZE: Performed by: ANESTHESIOLOGY

## 2022-01-04 PROCEDURE — 37000009 HC ANESTHESIA EA ADD 15 MINS: Performed by: STUDENT IN AN ORGANIZED HEALTH CARE EDUCATION/TRAINING PROGRAM

## 2022-01-04 PROCEDURE — 20000000 HC ICU ROOM

## 2022-01-04 PROCEDURE — 71000033 HC RECOVERY, INTIAL HOUR: Performed by: STUDENT IN AN ORGANIZED HEALTH CARE EDUCATION/TRAINING PROGRAM

## 2022-01-04 PROCEDURE — 27000177 HC AIRWAY, LARYNGEAL MASK: Performed by: ANESTHESIOLOGY

## 2022-01-04 PROCEDURE — 82962 GLUCOSE BLOOD TEST: CPT

## 2022-01-04 PROCEDURE — 77001 CHG FLUOROGUIDE CNTRL VEN ACCESS,PLACE,REPLACE,REMOVE: ICD-10-PCS | Mod: 26,,, | Performed by: STUDENT IN AN ORGANIZED HEALTH CARE EDUCATION/TRAINING PROGRAM

## 2022-01-04 PROCEDURE — 36600 WITHDRAWAL OF ARTERIAL BLOOD: CPT

## 2022-01-04 PROCEDURE — 94761 N-INVAS EAR/PLS OXIMETRY MLT: CPT

## 2022-01-04 PROCEDURE — 83605 ASSAY OF LACTIC ACID: CPT | Performed by: INTERNAL MEDICINE

## 2022-01-04 PROCEDURE — 36415 COLL VENOUS BLD VENIPUNCTURE: CPT | Performed by: NURSE PRACTITIONER

## 2022-01-04 PROCEDURE — 85025 COMPLETE CBC W/AUTO DIFF WBC: CPT | Performed by: INTERNAL MEDICINE

## 2022-01-04 PROCEDURE — 37000008 HC ANESTHESIA 1ST 15 MINUTES: Performed by: STUDENT IN AN ORGANIZED HEALTH CARE EDUCATION/TRAINING PROGRAM

## 2022-01-04 PROCEDURE — 85025 COMPLETE CBC W/AUTO DIFF WBC: CPT | Performed by: NURSE PRACTITIONER

## 2022-01-04 PROCEDURE — 80100014 HC HEMODIALYSIS 1:1

## 2022-01-04 PROCEDURE — 86923 COMPATIBILITY TEST ELECTRIC: CPT | Mod: 91 | Performed by: FAMILY MEDICINE

## 2022-01-04 RX ORDER — ACETAMINOPHEN 10 MG/ML
1000 INJECTION, SOLUTION INTRAVENOUS ONCE
Status: DISCONTINUED | OUTPATIENT
Start: 2022-01-04 | End: 2022-01-05

## 2022-01-04 RX ORDER — ONDANSETRON 4 MG/1
8 TABLET, ORALLY DISINTEGRATING ORAL EVERY 8 HOURS PRN
Status: DISCONTINUED | OUTPATIENT
Start: 2022-01-04 | End: 2022-01-05

## 2022-01-04 RX ORDER — MORPHINE SULFATE 1 MG/ML
INJECTION INTRAVENOUS CONTINUOUS
Status: DISCONTINUED | OUTPATIENT
Start: 2022-01-04 | End: 2022-01-04

## 2022-01-04 RX ORDER — DIPHENHYDRAMINE HYDROCHLORIDE 50 MG/ML
25 INJECTION INTRAMUSCULAR; INTRAVENOUS EVERY 6 HOURS PRN
Status: DISCONTINUED | OUTPATIENT
Start: 2022-01-04 | End: 2022-01-04 | Stop reason: HOSPADM

## 2022-01-04 RX ORDER — SODIUM CHLORIDE 9 MG/ML
INJECTION, SOLUTION INTRAVENOUS
Status: CANCELLED | OUTPATIENT
Start: 2022-01-04

## 2022-01-04 RX ORDER — MORPHINE SULFATE 10 MG/ML
4 INJECTION INTRAMUSCULAR; INTRAVENOUS; SUBCUTANEOUS EVERY 5 MIN PRN
Status: DISCONTINUED | OUTPATIENT
Start: 2022-01-04 | End: 2022-01-04 | Stop reason: HOSPADM

## 2022-01-04 RX ORDER — DIPHENHYDRAMINE HCL 25 MG
25 CAPSULE ORAL EVERY 4 HOURS PRN
Status: DISCONTINUED | OUTPATIENT
Start: 2022-01-04 | End: 2022-01-05

## 2022-01-04 RX ORDER — BUPIVACAINE HYDROCHLORIDE 2.5 MG/ML
INJECTION, SOLUTION EPIDURAL; INFILTRATION; INTRACAUDAL
Status: DISCONTINUED | OUTPATIENT
Start: 2022-01-04 | End: 2022-01-04 | Stop reason: HOSPADM

## 2022-01-04 RX ORDER — HEPARIN SODIUM 1000 [USP'U]/ML
4000 INJECTION, SOLUTION INTRAVENOUS; SUBCUTANEOUS
Status: DISCONTINUED | OUTPATIENT
Start: 2022-01-04 | End: 2022-01-08 | Stop reason: HOSPADM

## 2022-01-04 RX ORDER — LINEZOLID 2 MG/ML
600 INJECTION, SOLUTION INTRAVENOUS
Status: DISCONTINUED | OUTPATIENT
Start: 2022-01-04 | End: 2022-01-08 | Stop reason: HOSPADM

## 2022-01-04 RX ORDER — SODIUM CHLORIDE 9 MG/ML
INJECTION, SOLUTION INTRAVENOUS CONTINUOUS
Status: DISCONTINUED | OUTPATIENT
Start: 2022-01-04 | End: 2022-01-04

## 2022-01-04 RX ORDER — NALOXONE HCL 0.4 MG/ML
0.2 VIAL (ML) INJECTION
Status: DISCONTINUED | OUTPATIENT
Start: 2022-01-04 | End: 2022-01-05

## 2022-01-04 RX ORDER — MEPERIDINE HYDROCHLORIDE 25 MG/ML
25 INJECTION INTRAMUSCULAR; INTRAVENOUS; SUBCUTANEOUS EVERY 10 MIN PRN
Status: DISCONTINUED | OUTPATIENT
Start: 2022-01-04 | End: 2022-01-04 | Stop reason: HOSPADM

## 2022-01-04 RX ORDER — LIDOCAINE HCL/PF 100 MG/5ML
SYRINGE (ML) INTRAVENOUS
Status: DISCONTINUED | OUTPATIENT
Start: 2022-01-04 | End: 2022-01-04

## 2022-01-04 RX ORDER — MUPIROCIN 20 MG/G
OINTMENT TOPICAL 2 TIMES DAILY
Status: DISCONTINUED | OUTPATIENT
Start: 2022-01-04 | End: 2022-01-08 | Stop reason: HOSPADM

## 2022-01-04 RX ORDER — HEPARIN SODIUM 5000 [USP'U]/ML
5000 INJECTION, SOLUTION INTRAVENOUS; SUBCUTANEOUS EVERY 12 HOURS
Status: DISCONTINUED | OUTPATIENT
Start: 2022-01-04 | End: 2022-01-08 | Stop reason: HOSPADM

## 2022-01-04 RX ORDER — PANTOPRAZOLE SODIUM 40 MG/10ML
40 INJECTION, POWDER, LYOPHILIZED, FOR SOLUTION INTRAVENOUS 2 TIMES DAILY
Status: DISCONTINUED | OUTPATIENT
Start: 2022-01-04 | End: 2022-01-08 | Stop reason: HOSPADM

## 2022-01-04 RX ORDER — HYDROMORPHONE HYDROCHLORIDE 2 MG/ML
0.5 INJECTION, SOLUTION INTRAMUSCULAR; INTRAVENOUS; SUBCUTANEOUS EVERY 5 MIN PRN
Status: DISCONTINUED | OUTPATIENT
Start: 2022-01-04 | End: 2022-01-04 | Stop reason: HOSPADM

## 2022-01-04 RX ORDER — MIDAZOLAM HYDROCHLORIDE 1 MG/ML
INJECTION INTRAMUSCULAR; INTRAVENOUS
Status: DISCONTINUED | OUTPATIENT
Start: 2022-01-04 | End: 2022-01-04

## 2022-01-04 RX ORDER — METOCLOPRAMIDE HYDROCHLORIDE 5 MG/ML
5 INJECTION INTRAMUSCULAR; INTRAVENOUS EVERY 6 HOURS PRN
Status: DISCONTINUED | OUTPATIENT
Start: 2022-01-04 | End: 2022-01-05

## 2022-01-04 RX ORDER — SODIUM CHLORIDE 9 MG/ML
INJECTION, SOLUTION INTRAVENOUS ONCE
Status: CANCELLED | OUTPATIENT
Start: 2022-01-04 | End: 2022-01-04

## 2022-01-04 RX ORDER — HYDROCODONE BITARTRATE AND ACETAMINOPHEN 500; 5 MG/1; MG/1
TABLET ORAL
Status: DISCONTINUED | OUTPATIENT
Start: 2022-01-04 | End: 2022-01-06

## 2022-01-04 RX ORDER — HEPARIN SODIUM 1000 [USP'U]/ML
INJECTION, SOLUTION INTRAVENOUS; SUBCUTANEOUS
Status: DISCONTINUED | OUTPATIENT
Start: 2022-01-04 | End: 2022-01-04 | Stop reason: HOSPADM

## 2022-01-04 RX ORDER — PROPOFOL 10 MG/ML
INJECTION, EMULSION INTRAVENOUS
Status: DISCONTINUED | OUTPATIENT
Start: 2022-01-04 | End: 2022-01-04

## 2022-01-04 RX ORDER — LIDOCAINE HYDROCHLORIDE AND EPINEPHRINE 10; 10 MG/ML; UG/ML
INJECTION, SOLUTION INFILTRATION; PERINEURAL
Status: DISCONTINUED | OUTPATIENT
Start: 2022-01-04 | End: 2022-01-04 | Stop reason: HOSPADM

## 2022-01-04 RX ORDER — ONDANSETRON 2 MG/ML
4 INJECTION INTRAMUSCULAR; INTRAVENOUS DAILY PRN
Status: DISCONTINUED | OUTPATIENT
Start: 2022-01-04 | End: 2022-01-04 | Stop reason: HOSPADM

## 2022-01-04 RX ADMIN — SODIUM BICARBONATE: 84 INJECTION, SOLUTION INTRAVENOUS at 03:01

## 2022-01-04 RX ADMIN — MUPIROCIN: 20 OINTMENT TOPICAL at 08:01

## 2022-01-04 RX ADMIN — PROPOFOL 100 MG: 10 INJECTION, EMULSION INTRAVENOUS at 11:01

## 2022-01-04 RX ADMIN — CLINDAMYCIN IN 5 PERCENT DEXTROSE 900 MG: 18 INJECTION, SOLUTION INTRAVENOUS at 03:01

## 2022-01-04 RX ADMIN — HEPARIN SODIUM 5000 UNITS: 5000 INJECTION INTRAVENOUS; SUBCUTANEOUS at 02:01

## 2022-01-04 RX ADMIN — HEPARIN SODIUM 5000 UNITS: 5000 INJECTION INTRAVENOUS; SUBCUTANEOUS at 08:01

## 2022-01-04 RX ADMIN — CLINDAMYCIN IN 5 PERCENT DEXTROSE 900 MG: 18 INJECTION, SOLUTION INTRAVENOUS at 11:01

## 2022-01-04 RX ADMIN — PENICILLIN G POTASSIUM 2 MILLION UNITS: 5000000 INJECTION, POWDER, FOR SOLUTION INTRAMUSCULAR; INTRAVENOUS at 01:01

## 2022-01-04 RX ADMIN — PIPERACILLIN AND TAZOBACTAM 4.5 G: 4; .5 INJECTION, POWDER, LYOPHILIZED, FOR SOLUTION INTRAVENOUS; PARENTERAL at 05:01

## 2022-01-04 RX ADMIN — PANTOPRAZOLE SODIUM 40 MG: 40 INJECTION, POWDER, FOR SOLUTION INTRAVENOUS at 02:01

## 2022-01-04 RX ADMIN — SODIUM ZIRCONIUM CYCLOSILICATE 10 G: 10 POWDER, FOR SUSPENSION ORAL at 02:01

## 2022-01-04 RX ADMIN — PENICILLIN G POTASSIUM 2 MILLION UNITS: 5000000 INJECTION, POWDER, FOR SOLUTION INTRAMUSCULAR; INTRAVENOUS at 10:01

## 2022-01-04 RX ADMIN — SODIUM CHLORIDE: 9 INJECTION, SOLUTION INTRAVENOUS at 05:01

## 2022-01-04 RX ADMIN — LINEZOLID 600 MG: 600 INJECTION, SOLUTION INTRAVENOUS at 05:01

## 2022-01-04 RX ADMIN — MIDAZOLAM HYDROCHLORIDE 2 MG: 1 INJECTION, SOLUTION INTRAMUSCULAR; INTRAVENOUS at 11:01

## 2022-01-04 RX ADMIN — Medication 100 MG: at 11:01

## 2022-01-04 RX ADMIN — OXYCODONE HYDROCHLORIDE 5 MG: 5 TABLET ORAL at 05:01

## 2022-01-04 RX ADMIN — HEPARIN SODIUM 5000 UNITS: 5000 INJECTION INTRAVENOUS; SUBCUTANEOUS at 05:01

## 2022-01-04 RX ADMIN — FAMOTIDINE 20 MG: 20 TABLET, FILM COATED ORAL at 10:01

## 2022-01-04 RX ADMIN — PANTOPRAZOLE SODIUM 40 MG: 40 INJECTION, POWDER, FOR SOLUTION INTRAVENOUS at 08:01

## 2022-01-04 RX ADMIN — HEPARIN SODIUM 4000 UNITS: 1000 INJECTION INTRAVENOUS; SUBCUTANEOUS at 03:01

## 2022-01-04 RX ADMIN — PENICILLIN G POTASSIUM 2 MILLION UNITS: 5000000 INJECTION, POWDER, FOR SOLUTION INTRAMUSCULAR; INTRAVENOUS at 04:01

## 2022-01-04 RX ADMIN — OXYCODONE HYDROCHLORIDE 5 MG: 5 TABLET ORAL at 02:01

## 2022-01-04 NOTE — PROGRESS NOTES
[facility] - [address] [place] [address]  Nephrology  Progress Note    Patient Name: Flor Kelley  MRN: [id number]  Admission Date: 12/31/2021  Hospital Length of Stay: 4 days  Attending Provider: Jairo Anne MD   Primary Care Physician: Primary Doctor No  Principal Problem:Necrotizing fasciitis    Consults  Subjective:     Interval History: Ms. Kelley is seen in f/u of her ARF. She is anuric. She'll need dialysis.    Review of patient's allergies indicates:  No Known Allergies  Current Facility-Administered Medications   Medication Frequency    acetaminophen tablet 1,000 mg Q6H PRN    bisacodyL EC tablet 10 mg Daily PRN    clindamycin in D5W 900 mg/50 mL IVPB 900 mg Q8H    dextromethorphan-guaiFENesin  mg/5 ml liquid 10 mL Q6H PRN    famotidine tablet 20 mg BID    heparin (porcine) injection 5,000 Units Q8H    naloxone 0.4 mg/mL injection 0.02 mg PRN    ondansetron injection 4 mg Q6H PRN    ondansetron injection 8 mg Q6H PRN    oxyCODONE immediate release tablet 5 mg Q4H PRN    penicillin G potassium 2 Million Units in dextrose 5 % 50 mL IVPB Q4H    prochlorperazine injection Soln 5 mg Q6H PRN    simethicone chewable tablet 80 mg TID PRN    sodium bicarbonate 150 mEq in dextrose 5 % 1,000 mL infusion Continuous       Objective:     Vital Signs (Most Recent):  Temp: 98 °F (36.7 °C) (01/04/22 0720)  Pulse: 94 (01/04/22 0720)  Resp: 16 (01/04/22 0720)  BP: 134/80 (01/04/22 0720)  SpO2: 98 % (01/04/22 0720)  O2 Device (Oxygen Therapy): room air (01/04/22 0720) Vital Signs (24h Range):  Temp:  [97.3 °F (36.3 °C)-98.3 °F (36.8 °C)] 98 °F (36.7 °C)  Pulse:  [] 94  Resp:  [14-20] 16  SpO2:  [95 %-100 %] 98 %  BP: (106-146)/() 134/80     Weight: 82.4 kg (181 lb 10.5 oz) (01/03/22 0440)  Body mass index is 35.48 kg/m².  Body surface area is 1.87 meters squared.    I/O last 3 completed shifts:  In: 2020 [P.O.:120; I.V.:1600; IV Piggyback:300]  Out: 230 [Urine:30; Blood:200]    Physical Exam  No distress. Icteric sclera. No edema. Chest clear    Significant Labs:sure  BMP:   Recent Labs   Lab 01/03/22  0330   GLU 71*   *   K 5.4*   CL 90*   CO2 14*   BUN 65*  65*   CREATININE 6.53*  6.53*   CALCIUM 7.2*     CBC:   Recent Labs   Lab 01/03/22 0330 01/03/22  0330 01/03/22  1022   WBC 16.94*  --   --    RBC 4.30  --   --    HGB 11.3*   < > 10.0*   HCT 31.6*   < > 29.1*   *  --   --    MCV 73.5*  --   --    MCH 26.3*  --   --    MCHC 35.8  --   --     < > = values in this interval not displayed.     LFTs:   Recent Labs   Lab 01/03/22 0330   ALT 19   AST 46*   ALKPHOS 78   BILITOT 3.4*   PROT 6.2*   ALBUMIN 0.9*     All labs within the past 24 hours have been reviewed.    Significant Imaging:  Labs: Reviewed    Assessment/Plan:     Active Diagnoses:    Diagnosis Date Noted POA    PRINCIPAL PROBLEM:  Necrotizing fasciitis [M72.6]  Yes    Hepatitis C [B19.20] 01/04/2022 Yes    ROSHAN (acute kidney injury) [N17.9] 01/03/2022 Yes    Encephalopathy, metabolic [G93.41] 01/03/2022 No    Cellulitis [L03.90] 01/01/2022 Yes    COVID-19 [U07.1] 01/01/2022 Yes    Hypertension [I10]  Yes      Problems Resolved During this Admission:       We'll ask for placement of dialysis cath and we'll begin dialysis. Discussed with pt and she's agreeable.    Thank you for your consult. I will follow-up with patient. Please contact us if you have any additional questions.    Ronaldo Kinsey MD  Nephrology  [address]

## 2022-01-04 NOTE — CONSULTS
[address]  Pulmonology  Consult Note    Patient Name: Flor Kelley  MRN: [id number]  Admission Date: 12/31/2021  Hospital Length of Stay: 4 days  Code Status: Full Code  Attending Physician: Nico Muller MD  Primary Care Provider: Primary Doctor No   Principal Problem: Necrotizing fasciitis    Inpatient consult to Pulmonology  Consult performed by: Michelle Jeffries, INOCENCIA-[facility]  Consult ordered by: Jairo Anne MD  Reason for consult: critical care eval   Assessment/Recommendations: Transfer to ICU for continued care of sepsis, ROSHAN and metabolic encephalopathy.    - check Labs including lactic acid, BC and CK   -  ABGs to rule out hypercapnia         Subjective:     HPI:  41-year-old female who was admitted to hospitalist service for abscess in her left mons pubis.  She also had a CT of the creatinine 2.8.  Surgery was consulted.  She has had a total of 2 I and D for necrotizing fasciitis.  Her last side 8 yesterday revealed spread to the left medial thigh.  Wound cultures positive for MRSA state that clindamycin.  Creatinine include:  1/0 2 to 1.19 but dramatically increased to 7.44 today.  Cardiology was consult.  Is at 30 cc urine output last 24 hours.  Hemodialysis catheter was placed in the OR today and she is currently on hemodialysis.  She is scheduled to return to the OR of debridement in the morning.          Past Medical History:   Diagnosis Date    Hypertension        Past Surgical History:   Procedure Laterality Date    DEBRIDEMENT OF LOWER EXTREMITY Left 1/1/2022    Procedure: DEBRIDEMENT, LEFT LOWER EXTREMITY/Groin/Pubis;  Surgeon: Brandyn Murrieta DO;  Location: [facility] OR;  Service: General;  Laterality: Left;    DEBRIDEMENT OF LOWER EXTREMITY Left 1/3/2022    Procedure: DEBRIDEMENT, LOWER EXTREMITY;  Surgeon: Brandyn Murrieta DO;  Location: [facility] OR;  Service: General;  Laterality: Left;    NO PAST SURGERIES      WOUND DEBRIDEMENT Left 1/3/2022     Procedure: DEBRIDEMENT, WOUND;  Surgeon: Brandyn Murrieta DO;  Location: [facility];  Service: General;  Laterality: Left;       Review of patient's allergies indicates:  No Known Allergies    Family History     Problem Relation (Age of Onset)    Hypertension Mother        Tobacco Use    Smoking status: Current Some Day Smoker    Smokeless tobacco: Never Used   Substance and Sexual Activity    Alcohol use: Not Currently    Drug use: Never    Sexual activity: Not Currently         Review of Systems   Unable to perform ROS: Mental status change   HENT: Negative for dental problem.      Objective:     Vital Signs (Most Recent):  Temp: 97 °F (36.1 °C) (01/04/22 1605)  Pulse: (!) 112 (01/04/22 1606)  Resp: 20 (01/04/22 1605)  BP: (!) 146/90 (01/04/22 1606)  SpO2: 100 % (01/04/22 1315) Vital Signs (24h Range):  Temp:  [96.8 °F (36 °C)-98.2 °F (36.8 °C)] 97 °F (36.1 °C)  Pulse:  [] 112  Resp:  [16-22] 20  SpO2:  [97 %-100 %] 100 %  BP: ()/() 146/90     Weight: 82.4 kg (181 lb 10.5 oz)  Body mass index is 35.48 kg/m².      Intake/Output Summary (Last 24 hours) at 1/4/2022 1623  Last data filed at 1/4/2022 1606  Gross per 24 hour   Intake 2620 ml   Output 1085 ml   Net 1535 ml       Physical Exam  Vitals reviewed.   Constitutional:       Appearance: She is obese.   HENT:      Right Ear: External ear normal.      Left Ear: External ear normal.      Nose: Nose normal.      Mouth/Throat:      Mouth: Mucous membranes are dry.   Cardiovascular:      Rate and Rhythm: Normal rate.      Pulses: Normal pulses.   Pulmonary:      Effort: Pulmonary effort is normal. No respiratory distress.      Breath sounds: Normal breath sounds.   Abdominal:      Palpations: Abdomen is soft.      Tenderness: There is abdominal tenderness.   Musculoskeletal:         General: Normal range of motion.      Cervical back: Normal range of motion and neck supple.   Skin:     General: Skin is warm and dry.      Capillary Refill:  Capillary refill takes less than 2 seconds.   Neurological:      Mental Status: She is disoriented.   Psychiatric:         Behavior: Behavior is uncooperative.         Vents:       Lines/Drains/Airways     Central Venous Catheter Line                 Hemodialysis Catheter 01/04/22 1300 right subclavian <1 day          Drain                 Urethral Catheter 01/03/22 0825 Silicone 1 day          Peripheral Intravenous Line                 Peripheral IV - Single Lumen 01/01/22 1718 22 G Posterior;Right Hand 2 days         Peripheral IV - Single Lumen 01/02/22 2141 22 G Left;Posterior Hand 1 day                Significant Labs:    CBC/Anemia Profile:  Recent Labs   Lab 01/03/22  0330 01/03/22  1022 01/04/22  0924 01/04/22  1232 01/04/22  1356   WBC 16.94*  --  36.53* 38.79*  --    HGB 11.3*   < > 6.0* 5.5* 6.4*   HCT 31.6*   < > 15.8* 15.3* 17.1*   *  --  135* 142*  --    MCV 73.5*  --  69.9* 73.9*  --    RDW 15.6*  --  15.5* 15.6*  --     < > = values in this interval not displayed.        Chemistries:  Recent Labs   Lab 01/03/22  0330 01/04/22  0924   * 125*   K 5.4* 5.1   CL 90* 84*   CO2 14* 20*   BUN 65*  65* 81*   CREATININE 6.53*  6.53* 7.44*   CALCIUM 7.2* 7.1*   ALBUMIN 0.9* 0.7*   PROT 6.2* 5.2*   BILITOT 3.4* 3.1*   ALKPHOS 78 73   ALT 19 9*   AST 46* 41*       All pertinent labs within the past 24 hours have been reviewed.    Significant Imaging:   I have reviewed all pertinent imaging results/findings within the past 24 hours.    Assessment/Plan:     * Necrotizing fasciitis  S/P debridement on 01/1 and 01/03- scheduled to return to the OR in AM   - on clindamycin; she ha had 2 does of IV vancomycin   - ID consulted and following       Sepsis  Secondary to nec fascitis   - on clinda   -  12/31- BC x 2 NGTD   - BP labile  - repeat LA 6.4  --- will transfer to ICU for closer monitoring; repeat BC X 2; pressors if needed     Acute blood loss anemia  H/H down to 5.5/15.3- she is getting 2 untis  of PRBCs on HD   - repeat CBC     Encephalopathy, metabolic  Multifactorial - septic and uremic from ROSHAN   - HD today (day 1)  - repeat BC x 2   - ABG to r/o hypercapnia          ROSHAN (acute kidney injury)  Cr 1.19 on 01/02-- 7.44--HD line placed today    - HD today with 2 units PRBCs   - nephrology consulted     COVID-19  Positive 12/31 - not vaccinated - exposed on 12/25   - monoclonial antibodies on admission  - day five steroids    - on NC   - xray reviewed   - continue isolation for a total of 10 days - this may change as CDC guidelines change           Thank you for your consult. I will follow-up with patient. Please contact us if you have any additional questions.     INOCENCIA Kelley-ACNP  Pulmonology  [facility] - [address] [place] [address]

## 2022-01-04 NOTE — DIALYSIS ROUNDING
Seen in dialysis. Appreciate Dr. Murrieta's help. Will need two units prbc's with hct 15. Can give on dialysis today if available or off dialysis if necessary.

## 2022-01-04 NOTE — SUBJECTIVE & OBJECTIVE
Past Medical History:   Diagnosis Date    Hypertension        Past Surgical History:   Procedure Laterality Date    DEBRIDEMENT OF LOWER EXTREMITY Left 1/1/2022    Procedure: DEBRIDEMENT, LEFT LOWER EXTREMITY/Groin/Pubis;  Surgeon: Brandyn Murrieta DO;  Location: [facility];  Service: General;  Laterality: Left;    DEBRIDEMENT OF LOWER EXTREMITY Left 1/3/2022    Procedure: DEBRIDEMENT, LOWER EXTREMITY;  Surgeon: Brandyn Murrieta DO;  Location: [facility] OR;  Service: General;  Laterality: Left;    NO PAST SURGERIES      WOUND DEBRIDEMENT Left 1/3/2022    Procedure: DEBRIDEMENT, WOUND;  Surgeon: Brandyn Murrieta DO;  Location: [facility] OR;  Service: General;  Laterality: Left;       Review of patient's allergies indicates:  No Known Allergies    Family History     Problem Relation (Age of Onset)    Hypertension Mother        Tobacco Use    Smoking status: Current Some Day Smoker    Smokeless tobacco: Never Used   Substance and Sexual Activity    Alcohol use: Not Currently    Drug use: Never    Sexual activity: Not Currently         Review of Systems   Unable to perform ROS: Mental status change   HENT: Negative for dental problem.      Objective:     Vital Signs (Most Recent):  Temp: 97 °F (36.1 °C) (01/04/22 1605)  Pulse: (!) 112 (01/04/22 1606)  Resp: 20 (01/04/22 1605)  BP: (!) 146/90 (01/04/22 1606)  SpO2: 100 % (01/04/22 1315) Vital Signs (24h Range):  Temp:  [96.8 °F (36 °C)-98.2 °F (36.8 °C)] 97 °F (36.1 °C)  Pulse:  [] 112  Resp:  [16-22] 20  SpO2:  [97 %-100 %] 100 %  BP: ()/() 146/90     Weight: 82.4 kg (181 lb 10.5 oz)  Body mass index is 35.48 kg/m².      Intake/Output Summary (Last 24 hours) at 1/4/2022 1623  Last data filed at 1/4/2022 1606  Gross per 24 hour   Intake 2620 ml   Output 1085 ml   Net 1535 ml       Physical Exam  Vitals reviewed.   Constitutional:       Appearance: She is obese.   HENT:      Right Ear: External ear normal.      Left Ear: External ear normal.       Nose: Nose normal.      Mouth/Throat:      Mouth: Mucous membranes are dry.   Cardiovascular:      Rate and Rhythm: Normal rate.      Pulses: Normal pulses.   Pulmonary:      Effort: Pulmonary effort is normal. No respiratory distress.      Breath sounds: Normal breath sounds.   Abdominal:      Palpations: Abdomen is soft.      Tenderness: There is abdominal tenderness.   Musculoskeletal:         General: Normal range of motion.      Cervical back: Normal range of motion and neck supple.   Skin:     General: Skin is warm and dry.      Capillary Refill: Capillary refill takes less than 2 seconds.   Neurological:      Mental Status: She is disoriented.   Psychiatric:         Behavior: Behavior is uncooperative.         Vents:       Lines/Drains/Airways     Central Venous Catheter Line                 Hemodialysis Catheter 01/04/22 1300 right subclavian <1 day          Drain                 Urethral Catheter 01/03/22 0825 Silicone 1 day          Peripheral Intravenous Line                 Peripheral IV - Single Lumen 01/01/22 1718 22 G Posterior;Right Hand 2 days         Peripheral IV - Single Lumen 01/02/22 2141 22 G Left;Posterior Hand 1 day                Significant Labs:    CBC/Anemia Profile:  Recent Labs   Lab 01/03/22  0330 01/03/22  1022 01/04/22  0924 01/04/22  1232 01/04/22  1356   WBC 16.94*  --  36.53* 38.79*  --    HGB 11.3*   < > 6.0* 5.5* 6.4*   HCT 31.6*   < > 15.8* 15.3* 17.1*   *  --  135* 142*  --    MCV 73.5*  --  69.9* 73.9*  --    RDW 15.6*  --  15.5* 15.6*  --     < > = values in this interval not displayed.        Chemistries:  Recent Labs   Lab 01/03/22  0330 01/04/22  0924   * 125*   K 5.4* 5.1   CL 90* 84*   CO2 14* 20*   BUN 65*  65* 81*   CREATININE 6.53*  6.53* 7.44*   CALCIUM 7.2* 7.1*   ALBUMIN 0.9* 0.7*   PROT 6.2* 5.2*   BILITOT 3.4* 3.1*   ALKPHOS 78 73   ALT 19 9*   AST 46* 41*       All pertinent labs within the past 24 hours have been reviewed.    Significant  Imaging:   I have reviewed all pertinent imaging results/findings within the past 24 hours.

## 2022-01-04 NOTE — ASSESSMENT & PLAN NOTE
Cr 1.19 on 01/02-- 7.44--HD line placed today    - HD today with 2 units PRBCs   - nephrology consulted

## 2022-01-04 NOTE — PLAN OF CARE
Problem: Adult Inpatient Plan of Care  Goal: Plan of Care Review  Outcome: Ongoing, Progressing  Goal: Patient-Specific Goal (Individualized)  Outcome: Ongoing, Progressing  Goal: Absence of Hospital-Acquired Illness or Injury  Outcome: Ongoing, Progressing  Goal: Optimal Comfort and Wellbeing  Outcome: Ongoing, Progressing  Goal: Readiness for Transition of Care  Outcome: Ongoing, Progressing     Problem: Bariatric Environmental Safety  Goal: Safety Maintained with Care  Outcome: Ongoing, Progressing     Problem: Infection  Goal: Absence of Infection Signs and Symptoms  Outcome: Ongoing, Progressing     Problem: Fall Injury Risk  Goal: Absence of Fall and Fall-Related Injury  Outcome: Ongoing, Progressing     Problem: Skin Injury Risk Increased  Goal: Skin Health and Integrity  Outcome: Ongoing, Progressing   Plan of care reviewed and continues

## 2022-01-04 NOTE — ASSESSMENT & PLAN NOTE
Positive 12/31 - not vaccinated - exposed on 12/25   - monoclonial antibodies on admission  - day five steroids    - on NC   - xray reviewed   - continue isolation for a total of 10 days - this may change as CDC guidelines change

## 2022-01-04 NOTE — ASSESSMENT & PLAN NOTE
Multifactorial - septic and uremic from ROSHAN   - HD today (day 1)  - repeat BC x 2   - ABG to r/o hypercapnia

## 2022-01-04 NOTE — NURSING
Taken to OR via bed for dialysis catheter placement. Sent clindamycin dose to OR for 11 am dose. They will hang in OR.

## 2022-01-04 NOTE — OR NURSING
1330: BEDSIDE REPORT GIVEN TO LUZ OATES. VS WNL: TEMP 98.3, /78, , O2 99% ON RA, RESP 17.    1335: HELP TRANSFER PT TO DIALYSIS WITH ИВАН AND DR. HINES.

## 2022-01-04 NOTE — ASSESSMENT & PLAN NOTE
Acidoses, hyperkalemia  Start bicarb drip  Calcium gluconate and sodium zirconate for hyperkalemia  US renal     Consult nephrology    Now anuric, will likely need HD, nephrology to coordinate that with general surgery in terms of HD catheter placement.

## 2022-01-04 NOTE — PLAN OF CARE
Problem: Adult Inpatient Plan of Care  Goal: Plan of Care Review  Outcome: Ongoing, Progressing  Flowsheets (Taken 1/3/2022 2201)  Plan of Care Reviewed With: patient  Goal: Optimal Comfort and Wellbeing  Outcome: Ongoing, Progressing  Intervention: Monitor Pain and Promote Comfort  Flowsheets (Taken 1/3/2022 2201)  Pain Management Interventions: pain management plan reviewed with patient/caregiver  Intervention: Provide Person-Centered Care  Flowsheets (Taken 1/3/2022 2201)  Trust Relationship/Rapport:   care explained   questions encouraged   choices provided   reassurance provided   emotional support provided   thoughts/feelings acknowledged   empathic listening provided   questions answered     Problem: Infection  Goal: Absence of Infection Signs and Symptoms  Outcome: Ongoing, Progressing  Intervention: Prevent or Manage Infection  Flowsheets (Taken 1/3/2022 2201)  Fever Reduction/Comfort Measures: lightweight bedding  Infection Management: aseptic technique maintained  Isolation Precautions: precautions maintained     Problem: Fluid and Electrolyte Imbalance (Acute Kidney Injury/Impairment)  Goal: Fluid and Electrolyte Balance  Outcome: Ongoing, Progressing  Intervention: Monitor and Manage Fluid and Electrolyte Balance  Flowsheets (Taken 1/3/2022 2201)  Fluid/Electrolyte Management: fluids provided     Problem: Renal Function Impairment (Acute Kidney Injury/Impairment)  Goal: Effective Renal Function  Outcome: Ongoing, Progressing  Intervention: Monitor and Support Renal Function  Flowsheets (Taken 1/3/2022 2201)  Stabilization Measures:   verbal stimulation provided   tactile stimulation provided  Medication Review/Management: medications reviewed

## 2022-01-04 NOTE — PLAN OF CARE
Problem: Adult Inpatient Plan of Care  Goal: Plan of Care Review  Outcome: Ongoing, Progressing  Goal: Patient-Specific Goal (Individualized)  Outcome: Ongoing, Progressing  Goal: Absence of Hospital-Acquired Illness or Injury  Outcome: Ongoing, Progressing  Goal: Optimal Comfort and Wellbeing  Outcome: Ongoing, Progressing  Goal: Readiness for Transition of Care  Outcome: Ongoing, Progressing     Problem: Bariatric Environmental Safety  Goal: Safety Maintained with Care  Outcome: Ongoing, Progressing     Problem: Infection  Goal: Absence of Infection Signs and Symptoms  Outcome: Ongoing, Progressing     Problem: Fall Injury Risk  Goal: Absence of Fall and Fall-Related Injury  Outcome: Ongoing, Progressing     Problem: Skin Injury Risk Increased  Goal: Skin Health and Integrity  Outcome: Ongoing, Progressing     Problem: Fluid and Electrolyte Imbalance (Acute Kidney Injury/Impairment)  Goal: Fluid and Electrolyte Balance  Outcome: Ongoing, Progressing     Problem: Oral Intake Inadequate (Acute Kidney Injury/Impairment)  Goal: Optimal Nutrition Intake  Outcome: Ongoing, Progressing     Problem: Renal Function Impairment (Acute Kidney Injury/Impairment)  Goal: Effective Renal Function  Outcome: Ongoing, Progressing     Problem: Device-Related Complication Risk (Hemodialysis)  Goal: Safe, Effective Therapy Delivery  Outcome: Ongoing, Progressing     Problem: Hemodynamic Instability (Hemodialysis)  Goal: Effective Tissue Perfusion  Outcome: Ongoing, Progressing     Problem: Infection (Hemodialysis)  Goal: Absence of Infection Signs and Symptoms  Outcome: Ongoing, Progressing     Problem: Adjustment to Illness (Sepsis/Septic Shock)  Goal: Optimal Coping  Outcome: Ongoing, Progressing     Problem: Bleeding (Sepsis/Septic Shock)  Goal: Absence of Bleeding  Outcome: Ongoing, Progressing     Problem: Glycemic Control Impaired (Sepsis/Septic Shock)  Goal: Blood Glucose Level Within Desired Range  Outcome: Ongoing,  Progressing     Problem: Infection Progression (Sepsis/Septic Shock)  Goal: Absence of Infection Signs and Symptoms  Outcome: Ongoing, Progressing     Problem: Nutrition Impaired (Sepsis/Septic Shock)  Goal: Optimal Nutrition Intake  Outcome: Ongoing, Progressing

## 2022-01-04 NOTE — ASSESSMENT & PLAN NOTE
OR 1/1/22, 1/3/22     Cultures growing MRSA, susceptible to clindamycin     ID consulted- recommend doing clinda and pencillin.

## 2022-01-04 NOTE — ASSESSMENT & PLAN NOTE
To the OR for debridement of left thigh, groin and pubic region.    Risks and benefits explained with the patient including risks for infection, bleeding, injury to surrounding structures, hematoma/seroma formation with need for possible evacuation, possible open.  The patient verbalized understanding, agrees and wishes to proceed with surgery.    We will add clindamycin IV; continue Zosyn and vancomycin until cultures speciate.    NPO; Starting Heparin SQ q8; stop Eliquis for now (patient has not received a dose yet).    1/2:  Continue antibiotics.  Continue PCA.  Plan for OR tomorrow for debridement washout    1/3: Continued debridement    1/4: patient Oliguric with acute renal failure; To the OR for Tunneled Hemodialysis catheter today; will take back tomorrow for debridement

## 2022-01-04 NOTE — HPI
41-year-old female who was admitted to hospitalist service for abscess in her left mons pubis.  She also had a CT of the creatinine 2.8.  Surgery was consulted.  She has had a total of 2 I and D for necrotizing fasciitis.  Her last side 8 yesterday revealed spread to the left medial thigh.  Wound cultures positive for MRSA state that clindamycin.  Creatinine include:  1/0 2 to 1.19 but dramatically increased to 7.44 today.  Cardiology was consult.  Is at 30 cc urine output last 24 hours.  Hemodialysis catheter was placed in the OR today and she is currently on hemodialysis.  She is scheduled to return to the OR of debridement in the morning.

## 2022-01-04 NOTE — NURSING
Report called to ICU RN Vangie Noriega RN. Will transfer pt to ICU when done with dialysis and ICU room ready.

## 2022-01-04 NOTE — ASSESSMENT & PLAN NOTE
S/P debridement on 01/1 and 01/03- scheduled to return to the OR in AM   - on clindamycin; she ha had 2 does of IV vancomycin   - ID consulted and following

## 2022-01-04 NOTE — OP NOTE
[facility], [place] - Periop Services  Surgery Department  Operative Note    SUMMARY     Date of Procedure: 1/4/2022     Procedure: Procedure(s) (LRB):  Insertion,catheter,tunneled (N/A)     Surgeon(s) and Role:     * Brandyn Murrieta DO - Primary    Assisting Surgeon: None    Pre-Operative Diagnosis: ROSAHN (acute kidney injury) [N17.9]    Post-Operative Diagnosis: Post-Op Diagnosis Codes:     * ROSHAN (acute kidney injury) [N17.9]    Anesthesia: General/MAC    Operative Findings (including complications, if any): none    Description of Technical Procedures:  Patient was taken the operating room and placed on the operating table in the supine position.  Patient underwent general anesthesia per the anesthesia team.  The bilateral neck and chest were then prepped and draped in usual sterile fashion.  An ultrasound was used to identify the right internal jugular vein which was well distended and compressible.  We localize the skin over the neck with 0.25% Marcaine with 1% lidocaine with epinephrine.  We then cannulated the right internal jugular vein under ultrasound guidance with an 18 gauge needle and had good nonpulsatile, dark blood return.  A wire was passed and the needle was removed and wire placement was confirmed with fluoroscopy.  Then made an incision in the right chest and tunneled a 19 cm dialysis catheter in the subcutaneous tissue and exited the neck site.  We passed a series of dilators over the wire and then the dilator with peel-away sheath.  The dilator and wire was removed and the catheter was inserted in the peel-away sheath and the sheath was then removed.  We confirmed adequate placement with fluoroscopy.  We had good pullback of blood in both catheters in both catheter were then flushed with heparinized saline.  Fluoroscopy confirmed good placement.  The catheter was then secured to the skin with 2-0 silk sutures and the neck incision was approximated with 3-0 Vicryl simple interrupted suture.  The skin  was cleaned and dried, sterile dressings applied.  Patient was awakened and taken the PACU stable condition.  We took down the dressing superficially in the groin there was no significant bleeding at that site; replace top dressing.  We will get a chest x-ray in PACU        Estimated Blood Loss (EBL): 2 cc           Implants: * No implants in log *    Specimens:   Specimen (24h ago, onward)            None                  Condition: Good    Disposition: PACU - hemodynamically stable.    Attestation: I was present and scrubbed for the entire procedure.

## 2022-01-04 NOTE — SUBJECTIVE & OBJECTIVE
Interval History:  Stable, no acute events overnight.  Patient states pain is not controlled    Medications:  Continuous Infusions:   sodium bicarbonate drip 125 mL/hr at 01/04/22 0332     Scheduled Meds:   clindamycin (CLEOCIN) IVPB  900 mg Intravenous Q8H    famotidine  20 mg Oral BID    heparin (porcine)  5,000 Units Subcutaneous Q8H    pencillin G potassium IVPB  2 Million Units Intravenous Q4H     PRN Meds:acetaminophen, bisacodyL, dextromethorphan-guaiFENesin  mg/5 ml, naloxone, ondansetron, ondansetron, oxyCODONE, prochlorperazine, simethicone     Review of patient's allergies indicates:  No Known Allergies  Objective:     Vital Signs (Most Recent):  Temp: 98 °F (36.7 °C) (01/04/22 0720)  Pulse: 94 (01/04/22 0720)  Resp: 16 (01/04/22 0720)  BP: 134/80 (01/04/22 0720)  SpO2: 98 % (01/04/22 0720) Vital Signs (24h Range):  Temp:  [97.3 °F (36.3 °C)-98.3 °F (36.8 °C)] 98 °F (36.7 °C)  Pulse:  [] 94  Resp:  [14-20] 16  SpO2:  [95 %-100 %] 98 %  BP: (106-146)/() 134/80     Weight: 82.4 kg (181 lb 10.5 oz)  Body mass index is 35.48 kg/m².    Intake/Output - Last 3 Shifts       01/02 0700 01/03 0659 01/03 0700 01/04 0659 01/04 0700 01/05 0659    P.O.  120     I.V. (mL/kg)  1600 (19.4)     IV Piggyback  300     Total Intake(mL/kg)  2020 (24.5)     Urine (mL/kg/hr)  30 (0) 0 (0)    Blood  200     Total Output  230 0    Net  +1790 0                 Physical Exam  Constitutional:       General: She is not in acute distress.     Appearance: Normal appearance.   HENT:      Head: Normocephalic.   Cardiovascular:      Rate and Rhythm: Normal rate.   Pulmonary:      Effort: Pulmonary effort is normal. No respiratory distress.   Abdominal:      General: There is no distension.      Tenderness: There is no abdominal tenderness.   Genitourinary:     Comments: Steele in place with scant urine  Musculoskeletal:         General: Normal range of motion.      Comments: Dressing to pubic area and left thigh  clean and intact   Skin:     General: Skin is warm.      Coloration: Skin is not jaundiced.   Neurological:      General: No focal deficit present.      Mental Status: She is alert and oriented to person, place, and time.      Cranial Nerves: No cranial nerve deficit.         Significant Labs:  I have reviewed all pertinent lab results within the past 24 hours.  CBC:   Recent Labs   Lab 01/03/22  0330 01/03/22  0330 01/03/22  1022   WBC 16.94*  --   --    RBC 4.30  --   --    HGB 11.3*   < > 10.0*   HCT 31.6*   < > 29.1*   *  --   --    MCV 73.5*  --   --    MCH 26.3*  --   --    MCHC 35.8  --   --     < > = values in this interval not displayed.     BMP:   Recent Labs   Lab 01/03/22 0330   GLU 71*   *   K 5.4*   CL 90*   CO2 14*   BUN 65*  65*   CREATININE 6.53*  6.53*   CALCIUM 7.2*       Significant Diagnostics:  I have reviewed all pertinent imaging results/findings within the past 24 hours.

## 2022-01-04 NOTE — ASSESSMENT & PLAN NOTE
Likely d/t metabolic derangements  No focal neuroligcal deficit, easily directed     Seems to be improved today ie 1/4/22

## 2022-01-04 NOTE — ANESTHESIA POSTPROCEDURE EVALUATION
Anesthesia Post Evaluation    Patient: Flor Kelley    Procedure(s) Performed: Procedure(s) (LRB):  Insertion,catheter,tunneled (N/A)    Final Anesthesia Type: general      Patient location during evaluation: PACU  Post-procedure vital signs: reviewed and stable  Pain management: adequate  Airway patency: patent  RENEE mitigation strategies: Extubation while patient is awake and Extubation and recovery carried out in lateral, semiupright, or other nonsupine position  PONV status at discharge: No PONV  Anesthetic complications: no      Cardiovascular status: hemodynamically stable  Respiratory status: unassisted  Hydration status: euvolemic  Follow-up not needed.          Vitals Value Taken Time   /76 01/04/22 1540   Temp 36.1 °C (97 °F) 01/04/22 1530   Pulse 120 01/04/22 1540   Resp 18 01/04/22 1540   SpO2 100 % 01/04/22 1315         Event Time   Out of Recovery 01/04/2022 13:15:00         Pain/Angel Luis Score: Pain Rating Prior to Med Admin: 5 (1/4/2022  2:45 PM)  Pain Rating Post Med Admin: 0 (1/4/2022  5:46 AM)  Angel Luis Score: 8 (1/4/2022  1:14 PM)

## 2022-01-04 NOTE — SUBJECTIVE & OBJECTIVE
Interval History:     Pt more awake today and did not seem to be confused , did c/o a lot of  Pain, she knew where she was, her address, day and date, so looks like is coming back to her baseline, K and bicarb improved, however Cr and BUN worsening, w/ no urine output, defer timing of HD to nephrology, general surgery aware of putting in a HD catheter if nephrology okay with it.     pts hep c ab was positive however pcr quant pending.     Review of Systems   Constitutional: Positive for fatigue.   HENT: Negative.    Respiratory: Negative.    Cardiovascular: Negative.    Genitourinary: Positive for pelvic pain.        Anuria   Psychiatric/Behavioral: Negative for confusion.     Objective:     Vital Signs (Most Recent):  Temp: 98 °F (36.7 °C) (01/04/22 0720)  Pulse: 94 (01/04/22 0720)  Resp: 16 (01/04/22 0720)  BP: 134/80 (01/04/22 0720)  SpO2: 98 % (01/04/22 0734) Vital Signs (24h Range):  Temp:  [97.6 °F (36.4 °C)-98.3 °F (36.8 °C)] 98 °F (36.7 °C)  Pulse:  [] 94  Resp:  [14-19] 16  SpO2:  [95 %-100 %] 98 %  BP: (106-146)/() 134/80     Weight: 82.4 kg (181 lb 10.5 oz)  Body mass index is 35.48 kg/m².    Intake/Output Summary (Last 24 hours) at 1/4/2022 1051  Last data filed at 1/4/2022 0845  Gross per 24 hour   Intake 2020 ml   Output 15 ml   Net 2005 ml      Physical Exam  Constitutional:       General: She is not in acute distress.     Appearance: Normal appearance.   HENT:      Head: Normocephalic.   Cardiovascular:      Rate and Rhythm: Normal rate.   Pulmonary:      Effort: Pulmonary effort is normal. No respiratory distress.   Abdominal:      General: There is no distension.      Tenderness: There is no abdominal tenderness.   Genitourinary:     Comments: Steele in place with scant urine  Musculoskeletal:         General: Normal range of motion.      Comments: Dressing to pubic area and left thigh clean and intact   Skin:     General: Skin is warm.      Coloration: Skin is not jaundiced.    Neurological:      General: No focal deficit present.      Mental Status: She is alert and oriented to person, place, and time.      Cranial Nerves: No cranial nerve deficit.         Significant Labs: All pertinent labs within the past 24 hours have been reviewed.    Significant Imaging: I have reviewed all pertinent imaging results/findings within the past 24 hours.

## 2022-01-04 NOTE — PROGRESS NOTES
[facility] - [facility]  General Surgery  Progress Note    Subjective:     History of Present Illness:  41-year-old  female presented to the ER with complaints of lower left extremity pain in the thigh was spreading up to the groin and pubic area.  Patient states this started on Tuesday and she thought she has had a little folliculitis and this has progressively worsened.  Patient did develop cough few days ago and has tested positive for COVID.  Soft tissue ultrasound performed which showed phlegmon versus early abscess around the area.  This morning there was necrotic skin in the thigh in the pubic area is extremely tender and indurated.  Denies any chest pain or current shortness of breath      Post-Op Info:  Procedure(s) (LRB):  DEBRIDEMENT, WOUND (Left)  DEBRIDEMENT, LOWER EXTREMITY (Left)   1 Day Post-Op     Interval History:  Stable, no acute events overnight.  Patient states pain is not controlled    Medications:  Continuous Infusions:   sodium bicarbonate drip 125 mL/hr at 01/04/22 0332     Scheduled Meds:   clindamycin (CLEOCIN) IVPB  900 mg Intravenous Q8H    famotidine  20 mg Oral BID    heparin (porcine)  5,000 Units Subcutaneous Q8H    pencillin G potassium IVPB  2 Million Units Intravenous Q4H     PRN Meds:acetaminophen, bisacodyL, dextromethorphan-guaiFENesin  mg/5 ml, naloxone, ondansetron, ondansetron, oxyCODONE, prochlorperazine, simethicone     Review of patient's allergies indicates:  No Known Allergies  Objective:     Vital Signs (Most Recent):  Temp: 98 °F (36.7 °C) (01/04/22 0720)  Pulse: 94 (01/04/22 0720)  Resp: 16 (01/04/22 0720)  BP: 134/80 (01/04/22 0720)  SpO2: 98 % (01/04/22 0720) Vital Signs (24h Range):  Temp:  [97.3 °F (36.3 °C)-98.3 °F (36.8 °C)] 98 °F (36.7 °C)  Pulse:  [] 94  Resp:  [14-20] 16  SpO2:  [95 %-100 %] 98 %  BP: (106-146)/() 134/80     Weight: 82.4 kg (181 lb 10.5 oz)  Body mass index is 35.48  kg/m².    Intake/Output - Last 3 Shifts       01/02 0700  01/03 0659 01/03 0700  01/04 0659 01/04 0700  01/05 0659    P.O.  120     I.V. (mL/kg)  1600 (19.4)     IV Piggyback  300     Total Intake(mL/kg)  2020 (24.5)     Urine (mL/kg/hr)  30 (0) 0 (0)    Blood  200     Total Output  230 0    Net  +1790 0                 Physical Exam  Constitutional:       General: She is not in acute distress.     Appearance: Normal appearance.   HENT:      Head: Normocephalic.   Cardiovascular:      Rate and Rhythm: Normal rate.   Pulmonary:      Effort: Pulmonary effort is normal. No respiratory distress.   Abdominal:      General: There is no distension.      Tenderness: There is no abdominal tenderness.   Genitourinary:     Comments: Steele in place with scant urine  Musculoskeletal:         General: Normal range of motion.      Comments: Dressing to pubic area and left thigh clean and intact   Skin:     General: Skin is warm.      Coloration: Skin is not jaundiced.   Neurological:      General: No focal deficit present.      Mental Status: She is alert and oriented to person, place, and time.      Cranial Nerves: No cranial nerve deficit.         Significant Labs:  I have reviewed all pertinent lab results within the past 24 hours.  CBC:   Recent Labs   Lab 01/03/22  0330 01/03/22  0330 01/03/22  1022   WBC 16.94*  --   --    RBC 4.30  --   --    HGB 11.3*   < > 10.0*   HCT 31.6*   < > 29.1*   *  --   --    MCV 73.5*  --   --    MCH 26.3*  --   --    MCHC 35.8  --   --     < > = values in this interval not displayed.     BMP:   Recent Labs   Lab 01/03/22  0330   GLU 71*   *   K 5.4*   CL 90*   CO2 14*   BUN 65*  65*   CREATININE 6.53*  6.53*   CALCIUM 7.2*       Significant Diagnostics:  I have reviewed all pertinent imaging results/findings within the past 24 hours.    Assessment/Plan:     * Necrotizing fasciitis  To the OR for debridement of left thigh, groin and pubic region.    Risks and benefits explained  with the patient including risks for infection, bleeding, injury to surrounding structures, hematoma/seroma formation with need for possible evacuation, possible open.  The patient verbalized understanding, agrees and wishes to proceed with surgery.    We will add clindamycin IV; continue Zosyn and vancomycin until cultures speciate.    NPO; Starting Heparin SQ q8; stop Eliquis for now (patient has not received a dose yet).    1/2:  Continue antibiotics.  Continue PCA.  Plan for OR tomorrow for debridement washout    1/3: Continued debridement    1/4: patient Oliguric with acute renal failure; To the OR for Tunneled Hemodialysis catheter today; will take back tomorrow for debridement    ROSHAN (acute kidney injury)  To OR today for Tunneled Hemodialysis Catheter    Risks and benefits explained with the patient including risks for infection, bleeding, injury to surrounding structures, hematoma/seroma formation with need for possible evacuation, possible open.  The patient verbalized understanding, agrees and wishes to proceed with surgery.        Brandyn Motley, DO  General Surgery  [facility] - [address] [place] [address]

## 2022-01-04 NOTE — PROGRESS NOTES
[facility] - [facility] Medicine  Progress Note    Patient Name: Flor Kelley  MRN: [id number]  Patient Class: IP- Inpatient   Admission Date: 12/31/2021  Length of Stay: 4 days  Attending Physician: Jairo Anne MD  Primary Care Provider: Primary Doctor No        Subjective:     Principal Problem:Necrotizing fasciitis        HPI:  42 yo F presents to the ED with worsening LLE pain and swelling.  She states that she has not had any trauma to LLE but has noticed redness and swelling worsening over the past several days.  She has not had F/C but states that area has become warm and tender.  CT done in ED showed small abscess only.  Lactic acid 3.2 but repeat 1.8.  She has elevated BP and says she has not been able to afford any meds and that she has not seen a PCP since her diagnosis of HTN.  Her creat is 2.8 and is most likely chronic.  She has marked pronteinuria.      Patient has had a dry cough and has lost her appetitie in the last two days.  She has not had COVID vaccinations and she did attend a [employer] party recently and she is now COVID positive.  She is not hypoxic or SOB at present.  She is currently on her menstrual cycle and urine pregnancy is negative.        Overview/Hospital Course:  No notes on file    Interval History:     Pt more awake today and did not seem to be confused , did c/o a lot of  Pain, she knew where she was, her address, day and date, so looks like is coming back to her baseline, K and bicarb improved, however Cr and BUN worsening, w/ no urine output, defer timing of HD to nephrology, general surgery aware of putting in a HD catheter if nephrology okay with it.     pts hep c ab was positive however pcr quant pending.     Review of Systems   Constitutional: Positive for fatigue.   HENT: Negative.    Respiratory: Negative.    Cardiovascular: Negative.    Genitourinary: Positive for pelvic pain.        Anuria   Psychiatric/Behavioral: Negative for  confusion.     Objective:     Vital Signs (Most Recent):  Temp: 98 °F (36.7 °C) (01/04/22 0720)  Pulse: 94 (01/04/22 0720)  Resp: 16 (01/04/22 0720)  BP: 134/80 (01/04/22 0720)  SpO2: 98 % (01/04/22 0734) Vital Signs (24h Range):  Temp:  [97.6 °F (36.4 °C)-98.3 °F (36.8 °C)] 98 °F (36.7 °C)  Pulse:  [] 94  Resp:  [14-19] 16  SpO2:  [95 %-100 %] 98 %  BP: (106-146)/() 134/80     Weight: 82.4 kg (181 lb 10.5 oz)  Body mass index is 35.48 kg/m².    Intake/Output Summary (Last 24 hours) at 1/4/2022 1051  Last data filed at 1/4/2022 0845  Gross per 24 hour   Intake 2020 ml   Output 15 ml   Net 2005 ml      Physical Exam  Constitutional:       General: She is not in acute distress.     Appearance: Normal appearance.   HENT:      Head: Normocephalic.   Cardiovascular:      Rate and Rhythm: Normal rate.   Pulmonary:      Effort: Pulmonary effort is normal. No respiratory distress.   Abdominal:      General: There is no distension.      Tenderness: There is no abdominal tenderness.   Genitourinary:     Comments: Steele in place with scant urine  Musculoskeletal:         General: Normal range of motion.      Comments: Dressing to pubic area and left thigh clean and intact   Skin:     General: Skin is warm.      Coloration: Skin is not jaundiced.   Neurological:      General: No focal deficit present.      Mental Status: She is alert and oriented to person, place, and time.      Cranial Nerves: No cranial nerve deficit.         Significant Labs: All pertinent labs within the past 24 hours have been reviewed.    Significant Imaging: I have reviewed all pertinent imaging results/findings within the past 24 hours.      Assessment/Plan:      * Necrotizing fasciitis    OR 1/1/22, 1/3/22     Cultures growing MRSA, susceptible to clindamycin     ID consulted- recommend doing clinda and pencillin.           Hepatitis C  Ab positive  Quant pcr pending  US liver       Encephalopathy, metabolic  Likely d/t metabolic  derangements  No focal neuroligcal deficit, easily directed     Seems to be improved today ie 1/4/22      ROSHNA (acute kidney injury)  Acidoses, hyperkalemia  Start bicarb drip  Calcium gluconate and sodium zirconate for hyperkalemia  US renal     Consult nephrology    Now anuric, will likely need HD, nephrology to coordinate that with general surgery in terms of HD catheter placement.      Bladder scan      Hypertension  Hold any therapy d/t sepsis      COVID-19  DVT Ppx and H2 blockers  Monitor for oxygen requirement  No steroids as she is asymptomatic  S/p bam therapy  Pharmacy consulted for monoclonal ab.        VTE Risk Mitigation (From admission, onward)         Ordered     Place sequential compression device  Until discontinued         01/01/22 1548     heparin (porcine) injection 5,000 Units  Every 8 hours         01/01/22 0628                Discharge Planning   JOSÉ MIGUEL:      Code Status: Full Code   Is the patient medically ready for discharge?:     Reason for patient still in hospital (select all that apply): Treatment  Discharge Plan A: Home with family                  Rehmat PAUL Anne MD  Department of Hospital Medicine   [address]

## 2022-01-04 NOTE — PROGRESS NOTES
Progress Note                                                           Infectious disease   Admit Date: 12/31/2021    SUBJECTIVE:     Follow-up For:  Necrotizing fasciitis    HPI/Interval history:  Patient still quite ill but was more alert and interactive this morning prior to going to the operating room for hemodialysis catheter placement.  I saw her during hemodialysis and her blood pressure was low but started improving once fluid removal was discontinued.  She has not had any fever.    Review of Systems:    Unable to obtain due to altered mental state      OBJECTIVE:     Vital Signs Range (Last 24H):  Temp:  [96.8 °F (36 °C)-98.3 °F (36.8 °C)]   Pulse:  []   Resp:  [16-22]   BP: ()/(43-90)   SpO2:  [97 %-100 %]     Physical Exam:  Constitutional:  Ill-looking, still disoriented, very drowsy but arousable and able to follow simple commands   HENT: supple   Head: normocephalic, atraumatic   Eyes:  Remains mildly icteric. Pupils are equal, round, and reactive to light, no drainage   Cardiovascular: regular rate and rhythm, S1, S2 normal, no murmur, click, rub or gallop  Pulmonary: normal respiratory effort, no crepitations or wheezing  Gastrointestinal: non-distended, bowel sounds normal; non-tender, no masses or organomegaly appreciated  Muscular/Skeletal: Lower extremities without cyanosis or edema, no clubbing.  Bandages over wounds in groin and left proximal thigh region  Skin: Skin color, texture normal. No rashes, ulcers or new lesions. Skin warm and dry.   Psychiatric:  Disoriented    Laboratory:  CBC:   Recent Labs   Lab 01/04/22  1232 01/04/22  1232 01/04/22  1356   WBC 38.79*  --   --    RBC 2.07*  --   --    HGB 5.5*   < > 6.4*   HCT 15.3*   < > 17.1*   *  --   --    MCV 73.9*  --   --    MCH 26.6*  --   --    MCHC 35.9  --   --     < > = values in this interval not displayed.     BMP:   Recent Labs   Lab 01/04/22  0924   *   *   K 5.1   CL 84*   CO2 20*   BUN 81*    CREATININE 7.44*   CALCIUM 7.1*     CMP:   Recent Labs   Lab 01/04/22  0924   *   CALCIUM 7.1*   ALBUMIN 0.7*   PROT 5.2*   *   K 5.1   CO2 20*   CL 84*   BUN 81*   CREATININE 7.44*   ALKPHOS 73   ALT 9*   AST 41*   BILITOT 3.1*     Microbiology Results (last 7 days)     Procedure Component Value Units Date/Time    Blood Culture #1 [[id number]] Collected: 12/31/21 1942    Order Status: Completed Specimen: Blood from Peripheral, Antecubital, Right Updated: 01/04/22 0758     Culture, Blood No Growth At 72 Hours    Blood Culture #2 [[id number]] Collected: 12/31/21 1943    Order Status: Completed Specimen: Blood from Peripheral, Forearm, Right Updated: 01/04/22 0758     Culture, Blood No Growth At 72 Hours    Culture, Wound [[id number]]  (Abnormal)  (Susceptibility) Collected: 01/01/22 1200    Order Status: Completed Specimen: Wound from Thigh, Left Updated: 01/03/22 1001     Culture, Wound/Abscess Heavy Growth Methicillin resistant Staphylococcus aureus    Culture, Genital [[id number]]  (Abnormal)  (Susceptibility) Collected: 01/01/22 1342    Order Status: Completed Specimen: Genital from Labia Updated: 01/03/22 0959     Culture, Genital Heavy Growth Methicillin resistant Staphylococcus aureus    Culture, Anaerobe [[id number]] Collected: 01/01/22 1339    Order Status: Resulted Specimen: Abscess from Thigh, Left Updated: 01/01/22 1339    Culture, Anaerobe [[id number]] Collected: 01/01/22 1339    Order Status: Canceled Specimen: Abscess from Thigh, Left Updated: 01/01/22 1339    Culture, Anaerobe [[id number]] Collected: 01/01/22 1125    Order Status: Resulted Specimen: Wound from Groin Updated: 01/01/22 1215    Culture, Wound [[id number]] Collected: 01/01/22 1125    Order Status: Canceled Specimen: Wound from Groin Updated: 01/01/22 1215    Culture, Anaerobe [[id number]]     Order Status: Canceled Specimen: Abscess from Thigh, Left           Diagnostic Results:  Labs: Reviewed    ASSESSMENT/PLAN:     Active  Hospital Problems    Diagnosis  POA    *Necrotizing fasciitis [M72.6]  Yes    Hepatitis C [B19.20]  Yes    Acute blood loss anemia [D62]  No    ROSHAN (acute kidney injury) [N17.9]  Yes    Encephalopathy, metabolic [G93.41]  No    COVID-19 [U07.1]  Yes    Hypertension [I10]  Yes      Resolved Hospital Problems    Diagnosis Date Resolved POA    Cellulitis [L03.90] 01/04/2022 Yes     ASSESSMENT:  1. Necrotizing fasciitis, so far only MRSA isolated from tissue cultures.  Acutely worsened leukocytosis today along with severe lactic acidosis.  2. Acute renal failure, oliguric, now on dialysis as of today  3. Jaundice with mild transaminitis, hepatitis C antibody positive  4. COVID19 positive, s/p monoclonal antibody Rx        PLAN:  1. Escalate from clindamycin to linezolid  2. Escalate from penicillin to Zosyn  3. Probably needs repeat wound debridement, pending for tomorrow per surgery note  4. Hepatitis-C viral load  Prognosis guarded  Discussed with Dr. Anne

## 2022-01-04 NOTE — ASSESSMENT & PLAN NOTE
To OR today for Tunneled Hemodialysis Catheter    Risks and benefits explained with the patient including risks for infection, bleeding, injury to surrounding structures, hematoma/seroma formation with need for possible evacuation, possible open.  The patient verbalized understanding, agrees and wishes to proceed with surgery.

## 2022-01-04 NOTE — ASSESSMENT & PLAN NOTE
Secondary to nec fascitis   - on clinda   -  12/31- BC x 2 NGTD   - BP labile  - repeat LA 6.4  --- will transfer to ICU for closer monitoring; repeat BC X 2; pressors if needed

## 2022-01-04 NOTE — ANESTHESIA PREPROCEDURE EVALUATION
"                                                                                                             01/04/2022  Flor Kelley is a 41 y.o., female.    Anesthesia Evaluation    I have reviewed the Patient Summary Reports.   I have reviewed the NPO Status.   I have reviewed the Medications.     Review of Systems         Anesthesia Plan  Type of Anesthesia, risks & benefits discussed:  Anesthesia Type:  general    Patient's Preference:   Plan Factors:          Intra-op Monitoring Plan: standard ASA monitors  Intra-op Monitoring Plan Comments:   Post Op Pain Control Plan: IV/PO Opioids PRN  Post Op Pain Control Plan Comments:     Induction:   IV  Beta Blocker:         Informed Consent: Patient understands risks and agrees with Anesthesia plan.  Questions answered. Anesthesia consent signed with patient.  ASA Score: 4     Day of Surgery Review of History & Physical:            Ready For Surgery From Anesthesia Perspective.        NPO greater than 8 hours  NAC  NKDA     Hct 29  Platelets 126  Cr 7.4  Ca 7.1     HTN  COVID-19 positive   Necrotizing fasciitis of left thigh, groin and pubic region  ROSHAN (superimposed upon CKD)  Metabolic encephalopathy  Hepatitis "C"  Hypocalcemia  Anemia  Thrombocytopenia     Airway exam deferred (COVID precautions); adequate ROM at neck.       "

## 2022-01-04 NOTE — TRANSFER OF CARE
Anesthesia Transfer of Care Note    Patient: Flor Kelley    Procedure(s) Performed: Procedure(s) (LRB):  Insertion,catheter,tunneled (N/A)    Patient location: Other: OR    Anesthesia Type: general    Transport from OR: Transported from OR on 6-10 L/min O2 by face mask with adequate spontaneous ventilation    Post pain: adequate analgesia    Post assessment: no apparent anesthetic complications    Post vital signs: stable    Level of consciousness: sedated and awake    Nausea/Vomiting: no nausea/vomiting    Complications: none    Transfer of care protocol was followed      Last vitals:   Visit Vitals  /71 (BP Location: Right arm, Patient Position: Lying)   Pulse 104   Temp 36 °C (96.8 °F)   Resp 18   Ht 5' (1.524 m)   Wt 82.4 kg (181 lb 10.5 oz)   LMP 12/31/2021   SpO2 97%   Breastfeeding No   BMI 35.48 kg/m²

## 2022-01-05 ENCOUNTER — ANESTHESIA (OUTPATIENT)
Dept: SURGERY | Facility: HOSPITAL | Age: 42
DRG: 463 | End: 2022-01-05

## 2022-01-05 ENCOUNTER — ANESTHESIA EVENT (OUTPATIENT)
Dept: SURGERY | Facility: HOSPITAL | Age: 42
DRG: 463 | End: 2022-01-05

## 2022-01-05 VITALS
RESPIRATION RATE: 24 BRPM | HEART RATE: 104 BPM | OXYGEN SATURATION: 100 % | DIASTOLIC BLOOD PRESSURE: 63 MMHG | SYSTOLIC BLOOD PRESSURE: 90 MMHG

## 2022-01-05 LAB
ALBUMIN SERPL BCP-MCNC: 0.8 G/DL (ref 3.5–5)
ALBUMIN/GLOB SERPL: 0.2 {RATIO}
ALP SERPL-CCNC: 94 U/L (ref 37–98)
ALT SERPL W P-5'-P-CCNC: 8 U/L (ref 13–56)
AMMONIA PLAS-SCNC: 63 ΜMOL/L (ref 11–32)
ANION GAP SERPL CALCULATED.3IONS-SCNC: 20 MMOL/L (ref 7–16)
ANISOCYTOSIS BLD QL SMEAR: ABNORMAL
AST SERPL W P-5'-P-CCNC: 43 U/L (ref 15–37)
BACTERIA SPEC ANAEROBE CULT: ABNORMAL
BACTERIA SPEC ANAEROBE CULT: NORMAL
BASOPHILS # BLD AUTO: 0.03 K/UL (ref 0–0.2)
BASOPHILS NFR BLD AUTO: 0.1 % (ref 0–1)
BILIRUB SERPL-MCNC: 3.8 MG/DL (ref 0–1.2)
BUN SERPL-MCNC: 51 MG/DL (ref 7–18)
BUN/CREAT SERPL: 10 (ref 6–20)
CALCIUM SERPL-MCNC: 7.2 MG/DL (ref 8.5–10.1)
CHLORIDE SERPL-SCNC: 89 MMOL/L (ref 98–107)
CO2 SERPL-SCNC: 26 MMOL/L (ref 21–32)
CREAT SERPL-MCNC: 5.25 MG/DL (ref 0.55–1.02)
DIFFERENTIAL METHOD BLD: ABNORMAL
EOSINOPHIL # BLD AUTO: 0 K/UL (ref 0–0.5)
EOSINOPHIL NFR BLD AUTO: 0 % (ref 1–4)
ERYTHROCYTE [DISTWIDTH] IN BLOOD BY AUTOMATED COUNT: 15.5 % (ref 11.5–14.5)
FERRITIN SERPL-MCNC: 399 NG/ML (ref 8–252)
FSP TITR PPP LA: 863 MG/DL (ref 283–506)
GLOBULIN SER-MCNC: 4.7 G/DL (ref 2–4)
GLUCOSE SERPL-MCNC: 106 MG/DL (ref 74–106)
HAPTOGLOB SERPL NEPH-MCNC: 352 MG/DL (ref 30–200)
HCO3 UR-SCNC: 19.2 MMOL/L (ref 21–28)
HCO3 UR-SCNC: 8.6 MMOL/L (ref 21–28)
HCT VFR BLD AUTO: 21.5 % (ref 38–47)
HGB BLD-MCNC: 7.9 G/DL (ref 12–16)
IMM GRANULOCYTES # BLD AUTO: 2.86 K/UL (ref 0–0.04)
IMM GRANULOCYTES NFR BLD: 6.4 % (ref 0–0.4)
INR BLD: 1.28 (ref 0.9–1.1)
LYMPHOCYTES # BLD AUTO: 1.31 K/UL (ref 1–4.8)
LYMPHOCYTES NFR BLD AUTO: 3 % (ref 27–41)
LYMPHOCYTES NFR BLD MANUAL: 6 % (ref 27–41)
MCH RBC QN AUTO: 27.1 PG (ref 27–31)
MCHC RBC AUTO-ENTMCNC: 36.7 G/DL (ref 32–36)
MCV RBC AUTO: 73.9 FL (ref 80–96)
METAMYELOCYTES NFR BLD MANUAL: 1 %
MONOCYTES # BLD AUTO: 0.7 K/UL (ref 0–0.8)
MONOCYTES NFR BLD AUTO: 1.6 % (ref 2–6)
MONOCYTES NFR BLD MANUAL: 3 % (ref 2–6)
MPC BLD CALC-MCNC: 11 FL (ref 9.4–12.4)
MYELOCYTES NFR BLD MANUAL: 1 %
NEUTROPHILS # BLD AUTO: 39.47 K/UL (ref 1.8–7.7)
NEUTROPHILS NFR BLD AUTO: 88.9 % (ref 53–65)
NEUTS BAND NFR BLD MANUAL: 24 % (ref 1–5)
NEUTS SEG NFR BLD MANUAL: 65 % (ref 50–62)
NRBC # BLD AUTO: 0.04 X10E3/UL
NRBC, AUTO (.00): 0.1 %
PCO2 BLDA: 21 MMHG (ref 35–48)
PCO2 BLDA: 31 MMHG (ref 35–48)
PH SMN: 7.22 [PH] (ref 7.35–7.45)
PH SMN: 7.4 [PH] (ref 7.35–7.45)
PLATELET # BLD AUTO: 134 K/UL (ref 150–400)
PLATELET MORPHOLOGY: ABNORMAL
PO2 BLDA: 129 MMHG (ref 83–108)
PO2 BLDA: 162 MMHG (ref 83–108)
POC BASE EXCESS: -17.2 MMOL/L (ref -2–3)
POC BASE EXCESS: -4.6 MMOL/L (ref -2–3)
POC SATURATED O2: 98 % (ref 95–98)
POC SATURATED O2: 99 % (ref 95–98)
POTASSIUM SERPL-SCNC: 4 MMOL/L (ref 3.5–5.1)
PROT SERPL-MCNC: 5.5 G/DL (ref 6.4–8.2)
PROTHROMBIN TIME: 15.9 SECONDS (ref 11.7–14.7)
RBC # BLD AUTO: 2.91 M/UL (ref 4.2–5.4)
SODIUM SERPL-SCNC: 131 MMOL/L (ref 136–145)
TARGETS BLD QL SMEAR: ABNORMAL
WBC # BLD AUTO: 44.37 K/UL (ref 4.5–11)

## 2022-01-05 PROCEDURE — 25000003 PHARM REV CODE 250: Performed by: FAMILY MEDICINE

## 2022-01-05 PROCEDURE — 25000003 PHARM REV CODE 250: Performed by: INTERNAL MEDICINE

## 2022-01-05 PROCEDURE — 82140 ASSAY OF AMMONIA: CPT | Performed by: FAMILY MEDICINE

## 2022-01-05 PROCEDURE — 83010 ASSAY OF HAPTOGLOBIN QUANT: CPT | Performed by: INTERNAL MEDICINE

## 2022-01-05 PROCEDURE — 82962 GLUCOSE BLOOD TEST: CPT

## 2022-01-05 PROCEDURE — 88304 SURGICAL PATHOLOGY: ICD-10-PCS | Mod: 26,,, | Performed by: PATHOLOGY

## 2022-01-05 PROCEDURE — 36000707: Performed by: STUDENT IN AN ORGANIZED HEALTH CARE EDUCATION/TRAINING PROGRAM

## 2022-01-05 PROCEDURE — 85025 COMPLETE CBC W/AUTO DIFF WBC: CPT | Performed by: STUDENT IN AN ORGANIZED HEALTH CARE EDUCATION/TRAINING PROGRAM

## 2022-01-05 PROCEDURE — 36000706: Performed by: STUDENT IN AN ORGANIZED HEALTH CARE EDUCATION/TRAINING PROGRAM

## 2022-01-05 PROCEDURE — 80100014 HC HEMODIALYSIS 1:1

## 2022-01-05 PROCEDURE — 25000003 PHARM REV CODE 250: Performed by: ANESTHESIOLOGY

## 2022-01-05 PROCEDURE — 36415 COLL VENOUS BLD VENIPUNCTURE: CPT | Performed by: INTERNAL MEDICINE

## 2022-01-05 PROCEDURE — 25000003 PHARM REV CODE 250: Performed by: STUDENT IN AN ORGANIZED HEALTH CARE EDUCATION/TRAINING PROGRAM

## 2022-01-05 PROCEDURE — 94761 N-INVAS EAR/PLS OXIMETRY MLT: CPT

## 2022-01-05 PROCEDURE — 63600175 PHARM REV CODE 636 W HCPCS: Performed by: INTERNAL MEDICINE

## 2022-01-05 PROCEDURE — 63600175 PHARM REV CODE 636 W HCPCS: Performed by: ANESTHESIOLOGY

## 2022-01-05 PROCEDURE — 25000003 PHARM REV CODE 250: Performed by: NURSE ANESTHETIST, CERTIFIED REGISTERED

## 2022-01-05 PROCEDURE — 36600 WITHDRAWAL OF ARTERIAL BLOOD: CPT

## 2022-01-05 PROCEDURE — 80053 COMPREHEN METABOLIC PANEL: CPT | Performed by: FAMILY MEDICINE

## 2022-01-05 PROCEDURE — 85025 COMPLETE CBC W/AUTO DIFF WBC: CPT | Performed by: FAMILY MEDICINE

## 2022-01-05 PROCEDURE — D9220A PRA ANESTHESIA: Mod: ,,, | Performed by: ANESTHESIOLOGY

## 2022-01-05 PROCEDURE — 85610 PROTHROMBIN TIME: CPT | Performed by: INTERNAL MEDICINE

## 2022-01-05 PROCEDURE — 27201423 OPTIME MED/SURG SUP & DEVICES STERILE SUPPLY: Performed by: STUDENT IN AN ORGANIZED HEALTH CARE EDUCATION/TRAINING PROGRAM

## 2022-01-05 PROCEDURE — 11004 DBRDMT SKIN XTRNL GENT&PER: CPT | Mod: 79,,, | Performed by: STUDENT IN AN ORGANIZED HEALTH CARE EDUCATION/TRAINING PROGRAM

## 2022-01-05 PROCEDURE — 99232 PR SUBSEQUENT HOSPITAL CARE,LEVL II: ICD-10-PCS | Mod: ,,, | Performed by: INTERNAL MEDICINE

## 2022-01-05 PROCEDURE — 99232 SBSQ HOSP IP/OBS MODERATE 35: CPT | Mod: ,,, | Performed by: INTERNAL MEDICINE

## 2022-01-05 PROCEDURE — 99233 SBSQ HOSP IP/OBS HIGH 50: CPT | Mod: ,,, | Performed by: INTERNAL MEDICINE

## 2022-01-05 PROCEDURE — 99233 PR SUBSEQUENT HOSPITAL CARE,LEVL III: ICD-10-PCS | Mod: ,,, | Performed by: INTERNAL MEDICINE

## 2022-01-05 PROCEDURE — 27000221 HC OXYGEN, UP TO 24 HOURS

## 2022-01-05 PROCEDURE — 82803 BLOOD GASES ANY COMBINATION: CPT

## 2022-01-05 PROCEDURE — 80053 COMPREHEN METABOLIC PANEL: CPT | Performed by: INTERNAL MEDICINE

## 2022-01-05 PROCEDURE — 85384 FIBRINOGEN ACTIVITY: CPT | Performed by: INTERNAL MEDICINE

## 2022-01-05 PROCEDURE — 99900035 HC TECH TIME PER 15 MIN (STAT)

## 2022-01-05 PROCEDURE — 88304 TISSUE EXAM BY PATHOLOGIST: CPT | Mod: 26,,, | Performed by: PATHOLOGY

## 2022-01-05 PROCEDURE — 11004 PR DEBRIDE NECROTIC SKIN/ TISSUE, GENIT & PERINM: ICD-10-PCS | Mod: 79,,, | Performed by: STUDENT IN AN ORGANIZED HEALTH CARE EDUCATION/TRAINING PROGRAM

## 2022-01-05 PROCEDURE — 88304 TISSUE EXAM BY PATHOLOGIST: CPT | Mod: SUR | Performed by: STUDENT IN AN ORGANIZED HEALTH CARE EDUCATION/TRAINING PROGRAM

## 2022-01-05 PROCEDURE — C9113 INJ PANTOPRAZOLE SODIUM, VIA: HCPCS | Performed by: INTERNAL MEDICINE

## 2022-01-05 PROCEDURE — 63600175 PHARM REV CODE 636 W HCPCS: Performed by: NURSE ANESTHETIST, CERTIFIED REGISTERED

## 2022-01-05 PROCEDURE — 37000008 HC ANESTHESIA 1ST 15 MINUTES: Performed by: STUDENT IN AN ORGANIZED HEALTH CARE EDUCATION/TRAINING PROGRAM

## 2022-01-05 PROCEDURE — 37000009 HC ANESTHESIA EA ADD 15 MINS: Performed by: STUDENT IN AN ORGANIZED HEALTH CARE EDUCATION/TRAINING PROGRAM

## 2022-01-05 PROCEDURE — 20000000 HC ICU ROOM

## 2022-01-05 PROCEDURE — D9220A PRA ANESTHESIA: ICD-10-PCS | Mod: ,,, | Performed by: ANESTHESIOLOGY

## 2022-01-05 PROCEDURE — 82728 ASSAY OF FERRITIN: CPT | Performed by: INTERNAL MEDICINE

## 2022-01-05 RX ORDER — LIDOCAINE HYDROCHLORIDE 20 MG/ML
INJECTION, SOLUTION EPIDURAL; INFILTRATION; INTRACAUDAL; PERINEURAL
Status: DISCONTINUED | OUTPATIENT
Start: 2022-01-05 | End: 2022-01-05

## 2022-01-05 RX ORDER — PROPOFOL 10 MG/ML
VIAL (ML) INTRAVENOUS
Status: DISCONTINUED | OUTPATIENT
Start: 2022-01-05 | End: 2022-01-05

## 2022-01-05 RX ORDER — MIDAZOLAM HYDROCHLORIDE 1 MG/ML
INJECTION INTRAMUSCULAR; INTRAVENOUS
Status: DISCONTINUED | OUTPATIENT
Start: 2022-01-05 | End: 2022-01-05

## 2022-01-05 RX ORDER — PHENYLEPHRINE HYDROCHLORIDE 10 MG/ML
INJECTION INTRAVENOUS
Status: DISCONTINUED | OUTPATIENT
Start: 2022-01-05 | End: 2022-01-05

## 2022-01-05 RX ORDER — FENTANYL CITRATE 50 UG/ML
INJECTION, SOLUTION INTRAMUSCULAR; INTRAVENOUS
Status: DISCONTINUED | OUTPATIENT
Start: 2022-01-05 | End: 2022-01-05

## 2022-01-05 RX ORDER — ONDANSETRON 2 MG/ML
INJECTION INTRAMUSCULAR; INTRAVENOUS
Status: DISCONTINUED | OUTPATIENT
Start: 2022-01-05 | End: 2022-01-05

## 2022-01-05 RX ADMIN — FENTANYL CITRATE 100 MCG: 50 INJECTION INTRAMUSCULAR; INTRAVENOUS at 11:01

## 2022-01-05 RX ADMIN — MUPIROCIN: 20 OINTMENT TOPICAL at 08:01

## 2022-01-05 RX ADMIN — PHENYLEPHRINE HYDROCHLORIDE 100 MCG: 10 INJECTION INTRAVENOUS at 01:01

## 2022-01-05 RX ADMIN — PROPOFOL 150 MG: 10 INJECTION, EMULSION INTRAVENOUS at 11:01

## 2022-01-05 RX ADMIN — MUPIROCIN: 20 OINTMENT TOPICAL at 09:01

## 2022-01-05 RX ADMIN — FENTANYL CITRATE 50 MCG: 50 INJECTION INTRAMUSCULAR; INTRAVENOUS at 12:01

## 2022-01-05 RX ADMIN — LINEZOLID 600 MG: 600 INJECTION, SOLUTION INTRAVENOUS at 05:01

## 2022-01-05 RX ADMIN — PROPOFOL 50 MG: 10 INJECTION, EMULSION INTRAVENOUS at 12:01

## 2022-01-05 RX ADMIN — HEPARIN SODIUM 4000 UNITS: 1000 INJECTION INTRAVENOUS; SUBCUTANEOUS at 04:01

## 2022-01-05 RX ADMIN — HEPARIN SODIUM 5000 UNITS: 5000 INJECTION INTRAVENOUS; SUBCUTANEOUS at 09:01

## 2022-01-05 RX ADMIN — FENTANYL CITRATE 50 MCG: 50 INJECTION INTRAMUSCULAR; INTRAVENOUS at 01:01

## 2022-01-05 RX ADMIN — PANTOPRAZOLE SODIUM 40 MG: 40 INJECTION, POWDER, FOR SOLUTION INTRAVENOUS at 09:01

## 2022-01-05 RX ADMIN — MIDAZOLAM HYDROCHLORIDE 2 MG: 1 INJECTION, SOLUTION INTRAMUSCULAR; INTRAVENOUS at 11:01

## 2022-01-05 RX ADMIN — DEXTROSE MONOHYDRATE 25 G: 500 INJECTION PARENTERAL at 11:01

## 2022-01-05 RX ADMIN — PIPERACILLIN AND TAZOBACTAM 4.5 G: 4; .5 INJECTION, POWDER, LYOPHILIZED, FOR SOLUTION INTRAVENOUS; PARENTERAL at 05:01

## 2022-01-05 RX ADMIN — LIDOCAINE HYDROCHLORIDE 40 MG: 20 INJECTION, SOLUTION INTRAVENOUS at 11:01

## 2022-01-05 RX ADMIN — CALCIUM GLUCONATE 2 G: 98 INJECTION, SOLUTION INTRAVENOUS at 07:01

## 2022-01-05 RX ADMIN — OXYCODONE HYDROCHLORIDE 5 MG: 5 TABLET ORAL at 05:01

## 2022-01-05 RX ADMIN — SODIUM CHLORIDE: 9 INJECTION, SOLUTION INTRAVENOUS at 01:01

## 2022-01-05 RX ADMIN — SODIUM CHLORIDE: 9 INJECTION, SOLUTION INTRAVENOUS at 11:01

## 2022-01-05 RX ADMIN — PANTOPRAZOLE SODIUM 40 MG: 40 INJECTION, POWDER, FOR SOLUTION INTRAVENOUS at 08:01

## 2022-01-05 RX ADMIN — ONDANSETRON 8 MG: 2 INJECTION INTRAMUSCULAR; INTRAVENOUS at 12:01

## 2022-01-05 NOTE — SUBJECTIVE & OBJECTIVE
Interval History: Improved today, does have some pain. Plan for back to OR today for debridement.     Objective:     Vital Signs (Most Recent):  Temp: 98 °F (36.7 °C) (01/05/22 0715)  Pulse: 96 (01/05/22 1000)  Resp: (!) 23 (01/05/22 1000)  BP: (!) 170/97 (01/05/22 1000)  SpO2: 95 % (01/05/22 1000) Vital Signs (24h Range):  Temp:  [96.8 °F (36 °C)-98.5 °F (36.9 °C)] 98 °F (36.7 °C)  Pulse:  [] 96  Resp:  [12-23] 23  SpO2:  [91 %-100 %] 95 %  BP: ()/() 170/97     Weight: 85.5 kg (188 lb 7.9 oz)  Body mass index is 36.81 kg/m².      Intake/Output Summary (Last 24 hours) at 1/5/2022 1041  Last data filed at 1/5/2022 0900  Gross per 24 hour   Intake 2279.8 ml   Output 1090 ml   Net 1189.8 ml       Physical Exam  Vitals reviewed.   Constitutional:       General: She is not in acute distress.  HENT:      Head: Normocephalic and atraumatic.      Right Ear: External ear normal.      Left Ear: External ear normal.      Mouth/Throat:      Mouth: Mucous membranes are moist.      Pharynx: Oropharynx is clear.   Eyes:      Conjunctiva/sclera: Conjunctivae normal.      Pupils: Pupils are equal, round, and reactive to light.   Neck:      Comments: Right tunneled HD cath  Cardiovascular:      Rate and Rhythm: Normal rate and regular rhythm.      Heart sounds: Normal heart sounds.   Pulmonary:      Effort: Pulmonary effort is normal. No respiratory distress.      Breath sounds: Normal breath sounds. No wheezing, rhonchi or rales.   Abdominal:      General: Abdomen is flat. Bowel sounds are normal.      Palpations: Abdomen is soft.      Tenderness: There is no abdominal tenderness.   Musculoskeletal:      Cervical back: Neck supple.   Lymphadenopathy:      Cervical: No cervical adenopathy.   Skin:     General: Skin is warm and dry.      Comments: Groin wounds with dressing in place. CDI   Neurological:      General: No focal deficit present.      Mental Status: She is alert and oriented to person, place, and time.  Mental status is at baseline.   Psychiatric:      Comments: Depressed affect         Vents:  Oxygen Concentration (%): 2 (01/05/22 0301)    Lines/Drains/Airways     Central Venous Catheter Line                 Hemodialysis Catheter 01/04/22 1300 right internal jugular <1 day          Drain                 Urethral Catheter 01/03/22 0825 Silicone 2 days          Peripheral Intravenous Line                 Peripheral IV - Single Lumen 01/01/22 1718 22 G Posterior;Right Hand 3 days         Peripheral IV - Single Lumen 01/02/22 2141 22 G Left;Posterior Hand 2 days                Significant Labs:    CBC/Anemia Profile:  Recent Labs   Lab 01/04/22  1232 01/04/22  1232 01/04/22  1356 01/04/22  1545 01/05/22  0647   WBC 38.79*  --   --  45.53* 44.37*   HGB 5.5*   < > 6.4* 7.0* 7.9*   HCT 15.3*   < > 17.1* 20.0* 21.5*   *  --   --  166 134*   MCV 73.9*  --   --  74.9* 73.9*   RDW 15.6*  --   --  16.4* 15.5*   FERRITIN  --   --   --   --  399*    < > = values in this interval not displayed.        Chemistries:  Recent Labs   Lab 01/04/22  0924 01/04/22  1545 01/05/22  0647   * 134* 131*   K 5.1 3.4* 4.0   CL 84* 92* 89*   CO2 20* 22 26   BUN 81* 36* 51*   CREATININE 7.44* 3.35* 5.25*   CALCIUM 7.1* 7.7* 7.2*   ALBUMIN 0.7* 0.9* 0.8*   PROT 5.2* 5.8* 5.5*   BILITOT 3.1* 2.5* 3.8*   ALKPHOS 73 133* 94   ALT 9* 10* 8*   AST 41* 40* 43*       All pertinent labs within the past 24 hours have been reviewed.    Significant Imaging:  I have reviewed all pertinent imaging results/findings within the past 24 hours.

## 2022-01-05 NOTE — PROGRESS NOTES
Going back to the OR for debridement of perineum/groin/left thigh.    Risks and benefits explained with the patient including risks for infection, bleeding, injury to surrounding structures, hematoma/seroma formation with need for possible evacuation, possible open.  The patient verbalized understanding, agrees and wishes to proceed with surgery.

## 2022-01-05 NOTE — NURSING
0900- Joaquín called about dialyzing patient, Spoke with , he said to hold off on dialysis due to debridement scheduled in approximately an hour. Will plan to dialyze after debridement.   H&H 7 and 21.5,  aware      1000- Abby Jeffries NP aware of increased blood pressure

## 2022-01-05 NOTE — ASSESSMENT & PLAN NOTE
- Positive 12/31 - not vaccinated - exposed on 12/25   - monoclonial antibodies on admission  - on NC   - xray reviewed   - continue isolation for a total of 10 days - this may change as CDC guidelines change

## 2022-01-05 NOTE — PROGRESS NOTES
Progress Note                                                           Infectious disease   Admit Date: 12/31/2021    SUBJECTIVE:     Follow-up For:  Necrotizing fasciitis    HPI/Interval history:  Patient transferred to ICU last evening for hypotension but did require vasopressors.  She went for repeat debridement of her wounds today and there was necrotic tissue that needed removal.  No fever.  Getting 2nd dialysis today.  Main complaint is pain.  Has been more interactive today per nurses, less confused.      Review of Systems:    Unable to obtain due to acuity of illness      OBJECTIVE:     Vital Signs Range (Last 24H):  Temp:  [97.6 °F (36.4 °C)-98.5 °F (36.9 °C)]   Pulse:  []   Resp:  [12-31]   BP: ()/()   SpO2:  [91 %-100 %]     Physical Exam:  Constitutional:  Ill-looking, more responsive and able to follow simple commands   HENT: supple   Head: normocephalic, atraumatic   Eyes:  Remains mildly icteric. Pupils are equal, round, and reactive to light, no drainage   Cardiovascular: regular rate and rhythm, S1, S2 normal, no murmur, click, rub or gallop  Pulmonary: normal respiratory effort, no crepitations or wheezing  Gastrointestinal: non-distended, bowel sounds normal; non-tender, no masses or organomegaly appreciated  Muscular/Skeletal: Lower extremities without cyanosis or edema, no clubbing.  Bandages over wounds in groin and left proximal thigh region, solved with blood  Skin: Skin color, texture normal. No rashes, ulcers or new lesions. Skin warm and dry.   Psychiatric:  Disoriented    Laboratory:  CBC:   Recent Labs   Lab 01/05/22  0647   WBC 44.37*   RBC 2.91*   HGB 7.9*   HCT 21.5*   *   MCV 73.9*   MCH 27.1   MCHC 36.7*     BMP:   Recent Labs   Lab 01/05/22  0647      *   K 4.0   CL 89*   CO2 26   BUN 51*   CREATININE 5.25*   CALCIUM 7.2*     CMP:   Recent Labs   Lab 01/05/22  0647      CALCIUM 7.2*   ALBUMIN 0.8*   PROT 5.5*   *   K 4.0   CO2 26    CL 89*   BUN 51*   CREATININE 5.25*   ALKPHOS 94   ALT 8*   AST 43*   BILITOT 3.8*     Microbiology Results (last 7 days)     Procedure Component Value Units Date/Time    Culture, Anaerobe [[id number]]  (Abnormal) Collected: 01/01/22 1125    Order Status: Completed Specimen: Wound from Groin Updated: 01/05/22 1243     Culture, Anaerobe Prevotella bivia    Culture, Anaerobe [[id number]] Collected: 01/01/22 1339    Order Status: Completed Specimen: Abscess from Thigh, Left Updated: 01/05/22 0814     Culture, Anaerobe No Anaerobes Isolated    Blood Culture #2 [[id number]] Collected: 12/31/21 1943    Order Status: Completed Specimen: Blood from Peripheral, Forearm, Right Updated: 01/05/22 0725     Culture, Blood No Growth At 72 Hours    Blood Culture #1 [[id number]] Collected: 12/31/21 1942    Order Status: Completed Specimen: Blood from Peripheral, Antecubital, Right Updated: 01/05/22 0725     Culture, Blood No Growth At 72 Hours    Blood culture (site 1) [[id number]] Collected: 01/04/22 1622    Order Status: Resulted Specimen: Blood Updated: 01/04/22 1656    Blood culture (site 2) [[id number]] Collected: 01/04/22 1641    Order Status: Resulted Specimen: Blood Updated: 01/04/22 1656    Culture, Wound [[id number]]  (Abnormal)  (Susceptibility) Collected: 01/01/22 1200    Order Status: Completed Specimen: Wound from Thigh, Left Updated: 01/03/22 1001     Culture, Wound/Abscess Heavy Growth Methicillin resistant Staphylococcus aureus    Culture, Genital [[id number]]  (Abnormal)  (Susceptibility) Collected: 01/01/22 1342    Order Status: Completed Specimen: Genital from Labia Updated: 01/03/22 0959     Culture, Genital Heavy Growth Methicillin resistant Staphylococcus aureus    Culture, Anaerobe [[id number]] Collected: 01/01/22 1339    Order Status: Canceled Specimen: Abscess from Thigh, Left Updated: 01/01/22 1339    Culture, Wound [[id number]] Collected: 01/01/22 1125    Order Status: Canceled Specimen: Wound from Groin Updated:  01/01/22 1215    Culture, Anaerobe [[id number]]     Order Status: Canceled Specimen: Abscess from Thigh, Left           Diagnostic Results:  Labs: Reviewed    ASSESSMENT/PLAN:     Active Hospital Problems    Diagnosis  POA    *Necrotizing fasciitis [M72.6]  Yes    Hepatitis C [B19.20]  Yes    Acute blood loss anemia [D62]  No    Sepsis [A41.9]  Yes    ROSHAN (acute kidney injury) [N17.9]  Yes    Encephalopathy, metabolic [G93.41]  No    COVID-19 [U07.1]  Yes    Hypertension [I10]  Yes      Resolved Hospital Problems    Diagnosis Date Resolved POA    Cellulitis [L03.90] 01/04/2022 Yes     ASSESSMENT:  1. Necrotizing fasciitis - MRSA and Prevotella isolated from tissue cultures.  Worsening leukocytosis likely due to severity of her illness.  2. Acute renal failure, oliguric, now on dialysis as of 1/4  3. Jaundice with mild transaminitis, hepatitis C antibody positive; RNA PCR quantitative pending  4. COVID19 positive, s/p monoclonal antibody Rx        PLAN:  1. Continue Zosyn and linezolid  2. Aggressive wound care    I will be out for the next week.

## 2022-01-05 NOTE — ASSESSMENT & PLAN NOTE
- Cr 1.19 on 01/02-- 7.44--HD line placed   - initiated HD 1/4, remains anuric.  - HD today with 2 units PRBCs   - nephrology consulted

## 2022-01-05 NOTE — ASSESSMENT & PLAN NOTE
- meds were on hold due to hypotension yesterday  - BP elevated today, continue to monitor for now

## 2022-01-05 NOTE — PROGRESS NOTES
[facility]  Nephrology  Progress Note    Patient Name: Flor Kelley  MRN: [id number]  Admission Date: 12/31/2021  Hospital Length of Stay: 5 days  Attending Provider: Jairo Anne MD   Primary Care Physician: Primary Doctor No  Principal Problem:Necrotizing fasciitis    Consults  Subjective:     Interval History: F/U Acute Renal failure, Nec fasciitis, Covid. Back from further debridement in OR. Anuric/Oliguric    Review of patient's allergies indicates:  No Known Allergies  Current Facility-Administered Medications   Medication Frequency    0.9%  NaCl infusion (for blood administration) Q24H PRN    acetaminophen tablet 1,000 mg Q6H PRN    bisacodyL EC tablet 10 mg Daily PRN    dextromethorphan-guaiFENesin  mg/5 ml liquid 10 mL Q6H PRN    heparin (porcine) injection 4,000 Units PRN    heparin (porcine) injection 5,000 Units Q12H    linezolid 600 mg/300 mL IVPB 600 mg Q12H    mupirocin 2 % ointment BID    ondansetron injection 4 mg Q6H PRN    ondansetron injection 8 mg Q6H PRN    oxyCODONE immediate release tablet 5 mg Q4H PRN    pantoprazole injection 40 mg BID    piperacillin-tazobactam (ZOSYN) 4.5 g in dextrose 5 % in water (D5W) 5 % 100 mL IVPB (MB+) Q12H    prochlorperazine injection Soln 5 mg Q6H PRN    simethicone chewable tablet 80 mg TID PRN       Objective:     Vital Signs (Most Recent):  Temp: 98.2 °F (36.8 °C) (01/05/22 1101)  Pulse: 110 (01/05/22 1430)  Resp: (!) 22 (01/05/22 1430)  BP: 122/79 (01/05/22 1430)  SpO2: 100 % (01/05/22 1430)  O2 Device (Oxygen Therapy): nasal cannula (01/04/22 1915) Vital Signs (24h Range):  Temp:  [97 °F (36.1 °C)-98.5 °F (36.9 °C)] 98.2 °F (36.8 °C)  Pulse:  [] 110  Resp:  [12-24] 22  SpO2:  [91 %-100 %] 100 %  BP: ()/() 122/79     Weight: 85.5 kg (188 lb 7.9 oz) (01/05/22 0301)  Body mass index is 36.81 kg/m².  Body surface area is 1.9 meters squared.    I/O last 3 completed shifts:  In: 3999.8 [P.O.:120; I.V.:1500;  Blood:679.8; Other:700; IV Piggyback:1000]  Out: 1105 [Urine:35; Other:1070]    Physical Exam Arousable post anesthesia.     Significant Labs:sure  BMP:   Recent Labs   Lab 01/05/22  0647      *   K 4.0   CL 89*   CO2 26   BUN 51*   CREATININE 5.25*   CALCIUM 7.2*     CBC:   Recent Labs   Lab 01/05/22  0647   WBC 44.37*   RBC 2.91*   HGB 7.9*   HCT 21.5*   *   MCV 73.9*   MCH 27.1   MCHC 36.7*     All labs within the past 24 hours have been reviewed.    Significant Imaging:  Labs: Reviewed    Assessment/Plan:     Active Diagnoses:    Diagnosis Date Noted POA    PRINCIPAL PROBLEM:  Necrotizing fasciitis [M72.6]  Yes    Hepatitis C [B19.20] 01/04/2022 Yes    Acute blood loss anemia [D62] 01/04/2022 No    Sepsis [A41.9] 01/04/2022 Yes    ROSHAN (acute kidney injury) [N17.9] 01/03/2022 Yes    Encephalopathy, metabolic [G93.41] 01/03/2022 No    COVID-19 [U07.1] 01/01/2022 Yes    Hypertension [I10]  Yes      Problems Resolved During this Admission:    Diagnosis Date Noted Date Resolved POA    Cellulitis [L03.90] 01/01/2022 01/04/2022 Yes       She'll dialyze today for 2-3hr. We'll not try to remove significant fluid. Transfused yesterday    Thank you for your consult. I will follow-up with patient. Please contact us if you have any additional questions.    Ronaldo Kinsey MD  Nephrology  [facility]

## 2022-01-05 NOTE — NURSING
Taken to ICU 3 post dialysis and transfusion completed. Reported again at [facility] pt belongings sent with pt in belonging bags.

## 2022-01-05 NOTE — ANESTHESIA POSTPROCEDURE EVALUATION
Anesthesia Post Evaluation    Patient: Flor Kelley    Procedure(s) Performed: Procedure(s) (LRB):  IRRIGATION AND DEBRIDEMENT (N/A)    Final Anesthesia Type: general      Patient location during evaluation: PACU  Patient participation: Yes- Able to Participate  Level of consciousness: awake and sedated  Post-procedure vital signs: reviewed and stable  Pain management: adequate  Airway patency: patent    PONV status at discharge: No PONV  Anesthetic complications: no      Cardiovascular status: blood pressure returned to baseline  Respiratory status: unassisted  Hydration status: euvolemic  Follow-up not needed.          Vitals Value Taken Time   /72 01/05/22 1445   Temp 97 01/05/22 1445   Pulse 113 01/05/22 1445   Resp 21 01/05/22 1445   SpO2 100 % 01/05/22 1445   Vitals shown include unvalidated device data.      No case tracking events are documented in the log.      Pain/Angel Luis Score: Pain Rating Prior to Med Admin: 10 (1/5/2022  5:28 AM)  Pain Rating Post Med Admin: 8 (level slightly better) (1/5/2022  6:28 AM)  Angel Luis Score: 8 (1/5/2022  2:30 PM)

## 2022-01-05 NOTE — PROGRESS NOTES
[facility] ICU  Pulmonology  Progress Note    Patient Name: Flor Kelley  MRN: [id number]  Admission Date: 12/31/2021  Hospital Length of Stay: 5 days  Code Status: Full Code  Attending Provider: Nico Muller MD  Primary Care Provider: Primary Doctor No   Principal Problem: Necrotizing fasciitis    Subjective:     Interval History: Improved today, does have some pain. Plan for back to OR today for debridement.     Objective:     Vital Signs (Most Recent):  Temp: 98 °F (36.7 °C) (01/05/22 0715)  Pulse: 96 (01/05/22 1000)  Resp: (!) 23 (01/05/22 1000)  BP: (!) 170/97 (01/05/22 1000)  SpO2: 95 % (01/05/22 1000) Vital Signs (24h Range):  Temp:  [96.8 °F (36 °C)-98.5 °F (36.9 °C)] 98 °F (36.7 °C)  Pulse:  [] 96  Resp:  [12-23] 23  SpO2:  [91 %-100 %] 95 %  BP: ()/() 170/97     Weight: 85.5 kg (188 lb 7.9 oz)  Body mass index is 36.81 kg/m².      Intake/Output Summary (Last 24 hours) at 1/5/2022 1041  Last data filed at 1/5/2022 0900  Gross per 24 hour   Intake 2279.8 ml   Output 1090 ml   Net 1189.8 ml       Physical Exam  Vitals reviewed.   Constitutional:       General: She is not in acute distress.  HENT:      Head: Normocephalic and atraumatic.      Right Ear: External ear normal.      Left Ear: External ear normal.      Mouth/Throat:      Mouth: Mucous membranes are moist.      Pharynx: Oropharynx is clear.   Eyes:      Conjunctiva/sclera: Conjunctivae normal.      Pupils: Pupils are equal, round, and reactive to light.   Neck:      Comments: Right tunneled HD cath  Cardiovascular:      Rate and Rhythm: Normal rate and regular rhythm.      Heart sounds: Normal heart sounds.   Pulmonary:      Effort: Pulmonary effort is normal. No respiratory distress.      Breath sounds: Normal breath sounds. No wheezing, rhonchi or rales.   Abdominal:      General: Abdomen is flat. Bowel sounds are normal.      Palpations: Abdomen is soft.      Tenderness: There is no abdominal tenderness.    Musculoskeletal:      Cervical back: Neck supple.   Lymphadenopathy:      Cervical: No cervical adenopathy.   Skin:     General: Skin is warm and dry.      Comments: Groin wounds with dressing in place. CDI   Neurological:      General: No focal deficit present.      Mental Status: She is alert and oriented to person, place, and time. Mental status is at baseline.   Psychiatric:      Comments: Depressed affect         Vents:  Oxygen Concentration (%): 2 (01/05/22 0301)    Lines/Drains/Airways     Central Venous Catheter Line                 Hemodialysis Catheter 01/04/22 1300 right internal jugular <1 day          Drain                 Urethral Catheter 01/03/22 0825 Silicone 2 days          Peripheral Intravenous Line                 Peripheral IV - Single Lumen 01/01/22 1718 22 G Posterior;Right Hand 3 days         Peripheral IV - Single Lumen 01/02/22 2141 22 G Left;Posterior Hand 2 days                Significant Labs:    CBC/Anemia Profile:  Recent Labs   Lab 01/04/22  1232 01/04/22  1232 01/04/22  1356 01/04/22  1545 01/05/22  0647   WBC 38.79*  --   --  45.53* 44.37*   HGB 5.5*   < > 6.4* 7.0* 7.9*   HCT 15.3*   < > 17.1* 20.0* 21.5*   *  --   --  166 134*   MCV 73.9*  --   --  74.9* 73.9*   RDW 15.6*  --   --  16.4* 15.5*   FERRITIN  --   --   --   --  399*    < > = values in this interval not displayed.        Chemistries:  Recent Labs   Lab 01/04/22  0924 01/04/22  1545 01/05/22  0647   * 134* 131*   K 5.1 3.4* 4.0   CL 84* 92* 89*   CO2 20* 22 26   BUN 81* 36* 51*   CREATININE 7.44* 3.35* 5.25*   CALCIUM 7.1* 7.7* 7.2*   ALBUMIN 0.7* 0.9* 0.8*   PROT 5.2* 5.8* 5.5*   BILITOT 3.1* 2.5* 3.8*   ALKPHOS 73 133* 94   ALT 9* 10* 8*   AST 41* 40* 43*       All pertinent labs within the past 24 hours have been reviewed.    Significant Imaging:  I have reviewed all pertinent imaging results/findings within the past 24 hours.    Assessment/Plan:     * Necrotizing fasciitis  - S/P debridement on 01/1  and 01/03  - scheduled to return to the OR in AM   - on clindamycin; she had 2 does of IV vancomycin. Now changed to zyvox and zosyn  - ID consulted and following       Sepsis  - Secondary to nec fascitis   - clinda changed to zyvox and zosyn  - 12/31- BC x 2 NGTD   - lactate down trending    Encephalopathy, metabolic  - resolved today, talking on cell phone  - back to baseline     ROSHAN (acute kidney injury)  - Cr 1.19 on 01/02-- 7.44--HD line placed   - initiated HD 1/4, remains anuric.  - HD today with 2 units PRBCs   - nephrology consulted     Hypertension  - meds were on hold due to hypotension yesterday  - BP elevated today, continue to monitor for now    COVID-19  - Positive 12/31 - not vaccinated - exposed on 12/25   - monoclonial antibodies on admission  - on NC   - xray reviewed   - continue isolation for a total of 10 days - this may change as CDC guidelines change         Improved BP and mental status today. Transfer to floor.          Nico Muller MD  Pulmonology  [facility]

## 2022-01-05 NOTE — ASSESSMENT & PLAN NOTE
- S/P debridement on 01/1 and 01/03  - scheduled to return to the OR in AM   - on clindamycin; she had 2 does of IV vancomycin. Now changed to zyvox and zosyn  - ID consulted and following

## 2022-01-05 NOTE — ASSESSMENT & PLAN NOTE
- Secondary to nec fascitis   - clinda changed to zyvox and zosyn  - 12/31- BC x 2 NGTD   - lactate down trending

## 2022-01-05 NOTE — PLAN OF CARE
Problem: Adult Inpatient Plan of Care  Goal: Optimal Comfort and Wellbeing  Outcome: Ongoing, Progressing  Intervention: Provide Person-Centered Care  Flowsheets (Taken 1/4/2022 1926)  Trust Relationship/Rapport:   choices provided   care explained     Problem: Infection  Goal: Absence of Infection Signs and Symptoms  Outcome: Ongoing, Progressing     Problem: Fall Injury Risk  Goal: Absence of Fall and Fall-Related Injury  Outcome: Ongoing, Progressing  Intervention: Promote Injury-Free Environment  Flowsheets (Taken 1/4/2022 1926)  Safety Promotion/Fall Prevention: room near unit station     Problem: Skin Injury Risk Increased  Goal: Skin Health and Integrity  Outcome: Ongoing, Progressing     Problem: Fluid and Electrolyte Imbalance (Acute Kidney Injury/Impairment)  Goal: Fluid and Electrolyte Balance  Outcome: Ongoing, Progressing  Intervention: Monitor and Manage Fluid and Electrolyte Balance  Flowsheets (Taken 1/4/2022 1926)  Fluid/Electrolyte Management: fluids provided

## 2022-01-05 NOTE — OP NOTE
[facility] - Periop Services  Surgery Department  Operative Note    SUMMARY     Date of Procedure: 1/5/2022     Procedure: Procedure(s) (LRB):  IRRIGATION AND DEBRIDEMENT (N/A)     Surgeon(s) and Role:     * Brandyn Murrieta DO - Primary    Assisting Surgeon: None    Pre-Operative Diagnosis: Necrotizing fasciitis [M72.6]    Post-Operative Diagnosis: Post-Op Diagnosis Codes:     * Necrotizing fasciitis [M72.6]    Anesthesia: General/MAC    Operative Findings (including complications, if any):  Increased skin and fat necrosis extending to the inferior left thigh and superior left groin in superior pubic region    Description of Technical Procedures:  Patient was taken to the operating room and placed on the operating table in the lithotomy position.  Patient underwent general anesthesia per the anesthesia team.  The bilateral groin and thigh were then prepped and draped in usual sterile fashion.  We found continued skin and fat necrosis extending down to the fascia the left medial thigh.  Using cautery we extended the incision through the skin down to subcutaneous tissue and down to the fascia in debride a large segment of skin from the inferior medial thigh all the way up to the left groin across to the suprapubic region.  The circumferentially measured 7 by 45 x 3 cm.  We then debrided across from the left hip the suprapubic region which measured approximately 2 x 15 x 3 cm, excising necrotic skin and subcutaneous tissue down to fascia.  We also debrided some necrotic subcutaneous tissue that was on the previous wound bed.  We irrigated the cavity.  All bleeding was controlled electrocautery and clips.  The size the entire wound measures 30 cm across from the right groin to the left groin and extending 45 cm from the left groin all the way down to the medial posterior left thigh.  From the thigh up to the left vulva is approximately 33 cm and from the right groin down to the opposite side of the vulva on the  right was 22 cm in length.  All this depth was down approximately 3 cm to the fascia.  The area was irrigated and suctioned clear.  Packed with Kerlix and abdominal pads.  Steele catheter in place.  Patient was awakened, extubated and taken the PACU stable condition.        Estimated Blood Loss (EBL): 150 mL           Implants: * No implants in log *    Specimens:   Specimen (24h ago, onward)             Start     Ordered    01/05/22 1219  Surgical Pathology  RELEASE UPON ORDERING         01/05/22 1219                        Condition: Good    Disposition: ICU - extubated and stable.    Attestation: I was present and scrubbed for the entire procedure.

## 2022-01-05 NOTE — NURSING
Bandages reinforced, linens changes. Pt tolerated well, ABGs drawn due to 02 sat not picking up, picking up now at 100%.

## 2022-01-05 NOTE — TRANSFER OF CARE
Anesthesia Transfer of Care Note    Patient: Flor Kelley    Procedure(s) Performed: Procedure(s) (LRB):  IRRIGATION AND DEBRIDEMENT (N/A)    Patient location: ICU    Anesthesia Type: general    Transport from OR: Continuous SpO2 monitoring in transport. Continuous ECG monitoring in transport. Transported from OR on 6-10 L/min O2 by face mask with adequate spontaneous ventilation    Post pain: adequate analgesia    Post assessment: no apparent anesthetic complications    Post vital signs: stable    Level of consciousness: sedated and responds to stimulation    Nausea/Vomiting: no nausea/vomiting    Complications: none    Transfer of care protocol was followedComments: Report to receiving RN.  VSS,NAD noted.  Offered time and opportunity for any questions.        Last vitals:   Visit Vitals  /77 (BP Location: Right arm, Patient Position: Lying)   Pulse 107   Temp 36.8 °C (98.2 °F)   Resp (!) 22   Ht 5' (1.524 m)   Wt 85.5 kg (188 lb 7.9 oz)   LMP 12/31/2021   SpO2 100%   Breastfeeding No   BMI 36.81 kg/m²      Mitral valve insufficiency, unspecified etiology Essential hypertension Acute pulmonary edema with congestive heart failure

## 2022-01-05 NOTE — ANESTHESIA PREPROCEDURE EVALUATION
01/05/2022  Flor Kelley is a 41 y.o., female.    Pre-op Assessment    I have reviewed the Patient Summary Reports.    I have reviewed the NPO Status.   I have reviewed the Medications.     Review of Systems  Hematology/Oncology:         -- Anemia: Hematology Comments: HEPATITIS C    Acute blood loss anemia   Cardiovascular:   Hypertension    Pulmonary:   Pneumonia COVID 19+   Renal/:   Chronic Renal Disease, CRI, Dialysis, ESRD    Hepatic/GI:   Liver Disease, Hepatitis, C    Musculoskeletal:   NECROTIZING FACSIITIS   Neurological:   Metabolic encephalopathy Dementia        Physical Exam  General:  Well nourished    Airway/Jaw/Neck:  Airway Findings: Mouth Opening: Normal Mallampati: II     Eyes/Ears/Nose:  Eyes/Ears/Nose Findings:     Chest/Lungs:  Chest/Lungs Findings: Clear to auscultation     Heart/Vascular:  Heart Findings: Rate: Normal  Rhythm: Regular Rhythm        Mental Status:  Mental Status Findings:  Cooperative, Alert and Oriented         Anesthesia Plan  Type of Anesthesia, risks & benefits discussed:  Anesthesia Type:  general    Patient's Preference:   Plan Factors:          Intra-op Monitoring Plan: standard ASA monitors  Intra-op Monitoring Plan Comments:   Post Op Pain Control Plan: IV/PO Opioids PRN  Post Op Pain Control Plan Comments:     Induction:   IV  Beta Blocker:         Informed Consent: Patient understands risks and agrees with Anesthesia plan.  Questions answered. Anesthesia consent signed with patient.  ASA Score: 4     Day of Surgery Review of History & Physical:            Ready For Surgery From Anesthesia Perspective.     NPO greater than 8 hours  NAC  NKDA    Hct 44  Cr 3.6  Ca 8.0    HTN  COVID positive (airborne/contact/droplet precautions)  Necrotizing fasciitis of left thigh, groin and pubic region  CKD (likely ROSHAN superimposed upon CKD)  Hypocalcemia    Airway exam  deferred (COVID precautions); adequate ROM at neck.

## 2022-01-06 PROBLEM — K72.00 SHOCK LIVER: Status: ACTIVE | Noted: 2022-01-06

## 2022-01-06 PROBLEM — R65.21 SEPTIC SHOCK: Status: ACTIVE | Noted: 2022-01-04

## 2022-01-06 PROBLEM — E87.20 LACTIC ACIDOSIS: Status: ACTIVE | Noted: 2022-01-06

## 2022-01-06 LAB
ABO + RH BLD: NORMAL
ABO + RH BLD: NORMAL
ALBUMIN SERPL BCP-MCNC: 0.5 G/DL (ref 3.5–5)
ALBUMIN SERPL BCP-MCNC: 0.6 G/DL (ref 3.5–5)
ALBUMIN SERPL BCP-MCNC: 0.7 G/DL (ref 3.5–5)
ALBUMIN/GLOB SERPL: 0.1 {RATIO}
ALBUMIN/GLOB SERPL: 0.2 {RATIO}
ALBUMIN/GLOB SERPL: 0.2 {RATIO}
ALP SERPL-CCNC: 120 U/L (ref 37–98)
ALP SERPL-CCNC: 156 U/L (ref 37–98)
ALP SERPL-CCNC: 83 U/L (ref 37–98)
ALT SERPL W P-5'-P-CCNC: 1585 U/L (ref 13–56)
ALT SERPL W P-5'-P-CCNC: 1882 U/L (ref 13–56)
ALT SERPL W P-5'-P-CCNC: 68 U/L (ref 13–56)
AMMONIA PLAS-SCNC: 32 ΜMOL/L (ref 11–32)
ANION GAP SERPL CALCULATED.3IONS-SCNC: 25 MMOL/L (ref 7–16)
ANION GAP SERPL CALCULATED.3IONS-SCNC: 35 MMOL/L (ref 7–16)
ANION GAP SERPL CALCULATED.3IONS-SCNC: 37 MMOL/L (ref 7–16)
ANISOCYTOSIS BLD QL SMEAR: ABNORMAL
ANISOCYTOSIS BLD QL SMEAR: ABNORMAL
AORTIC ROOT ANNULUS: 2.7 CM
AORTIC VALVE CUSP SEPERATION: 1.9 CM
APTT PPP: 57.4 SECONDS (ref 25.2–37.3)
AST SERPL W P-5'-P-CCNC: 277 U/L (ref 15–37)
AST SERPL W P-5'-P-CCNC: 7076 U/L (ref 15–37)
AST SERPL W P-5'-P-CCNC: 9983 U/L (ref 15–37)
AV INDEX (PROSTH): 0.25
AV MEAN GRADIENT: 23 MMHG
AV PEAK GRADIENT: 1 MMHG
AV VALVE AREA: 0.58 CM2
AV VELOCITY RATIO: 0.5
BACTERIA BLD CULT: NORMAL
BACTERIA BLD CULT: NORMAL
BASOPHILS # BLD AUTO: 0.03 K/UL (ref 0–0.2)
BASOPHILS # BLD AUTO: 0.07 K/UL (ref 0–0.2)
BASOPHILS NFR BLD AUTO: 0.1 % (ref 0–1)
BASOPHILS NFR BLD AUTO: 0.1 % (ref 0–1)
BILIRUB SERPL-MCNC: 2.5 MG/DL (ref 0–1.2)
BILIRUB SERPL-MCNC: 3.2 MG/DL (ref 0–1.2)
BILIRUB SERPL-MCNC: 3.4 MG/DL (ref 0–1.2)
BLD PROD TYP BPU: NORMAL
BLD PROD TYP BPU: NORMAL
BLOOD UNIT EXPIRATION DATE: NORMAL
BLOOD UNIT EXPIRATION DATE: NORMAL
BLOOD UNIT TYPE CODE: 6200
BLOOD UNIT TYPE CODE: 6200
BSA FOR ECHO PROCEDURE: 1.87 M2
BUN SERPL-MCNC: 31 MG/DL (ref 7–18)
BUN SERPL-MCNC: 41 MG/DL (ref 7–18)
BUN SERPL-MCNC: 47 MG/DL (ref 7–18)
BUN/CREAT SERPL: 10 (ref 6–20)
BUN/CREAT SERPL: 12 (ref 6–20)
BUN/CREAT SERPL: 9 (ref 6–20)
CA-I SERPL-MCNC: 0.9 MMOL/L (ref 1.15–1.35)
CALCIUM SERPL-MCNC: 7 MG/DL (ref 8.5–10.1)
CALCIUM SERPL-MCNC: 7.1 MG/DL (ref 8.5–10.1)
CALCIUM SERPL-MCNC: 7.4 MG/DL (ref 8.5–10.1)
CHLORIDE SERPL-SCNC: 94 MMOL/L (ref 98–107)
CHLORIDE SERPL-SCNC: 94 MMOL/L (ref 98–107)
CHLORIDE SERPL-SCNC: 98 MMOL/L (ref 98–107)
CO2 SERPL-SCNC: 14 MMOL/L (ref 21–32)
CO2 SERPL-SCNC: 24 MMOL/L (ref 21–32)
CO2 SERPL-SCNC: 7 MMOL/L (ref 21–32)
CREAT SERPL-MCNC: 2.66 MG/DL (ref 0.55–1.02)
CREAT SERPL-MCNC: 4.42 MG/DL (ref 0.55–1.02)
CREAT SERPL-MCNC: 4.58 MG/DL (ref 0.55–1.02)
CRENATED CELLS: ABNORMAL
CROSSMATCH INTERPRETATION: NORMAL
CROSSMATCH INTERPRETATION: NORMAL
CV ECHO LV RWT: 0.95 CM
DIFFERENTIAL METHOD BLD: ABNORMAL
DIFFERENTIAL METHOD BLD: ABNORMAL
DISPENSE STATUS: NORMAL
DISPENSE STATUS: NORMAL
DOP CALC AO PEAK VEL: 0.4 M/S
DOP CALC AO VTI: 59 CM
DOP CALC LVOT AREA: 2.3 CM2
DOP CALC LVOT DIAMETER: 1.7 CM
DOP CALC LVOT PEAK VEL: 0.2 M/S
DOP CALC LVOT STROKE VOLUME: 34.03 CM3
DOP CALCLVOT PEAK VEL VTI: 15 CM
E WAVE DECELERATION TIME: 208 MSEC
ECHO EF ESTIMATED: 75 %
ECHO LV POSTERIOR WALL: 1.66 CM (ref 0.6–1.1)
EJECTION FRACTION: 75 %
EOSINOPHIL # BLD AUTO: 0 K/UL (ref 0–0.5)
EOSINOPHIL # BLD AUTO: 0.02 K/UL (ref 0–0.5)
EOSINOPHIL NFR BLD AUTO: 0 % (ref 1–4)
EOSINOPHIL NFR BLD AUTO: 0 % (ref 1–4)
ERYTHROCYTE [DISTWIDTH] IN BLOOD BY AUTOMATED COUNT: 15.2 % (ref 11.5–14.5)
ERYTHROCYTE [DISTWIDTH] IN BLOOD BY AUTOMATED COUNT: 16.2 % (ref 11.5–14.5)
ESTROGEN SERPL-MCNC: NORMAL PG/ML
FRACTIONAL SHORTENING: 51 % (ref 28–44)
FSP TITR PPP LA: 545 MG/DL (ref 283–506)
GLOBULIN SER-MCNC: 3.4 G/DL (ref 2–4)
GLOBULIN SER-MCNC: 3.5 G/DL (ref 2–4)
GLOBULIN SER-MCNC: 3.9 G/DL (ref 2–4)
GLUCOSE SERPL-MCNC: 104 MG/DL (ref 70–105)
GLUCOSE SERPL-MCNC: 106 MG/DL (ref 70–105)
GLUCOSE SERPL-MCNC: 111 MG/DL (ref 70–105)
GLUCOSE SERPL-MCNC: 135 MG/DL (ref 70–105)
GLUCOSE SERPL-MCNC: 140 MG/DL (ref 70–105)
GLUCOSE SERPL-MCNC: 147 MG/DL (ref 74–106)
GLUCOSE SERPL-MCNC: 148 MG/DL (ref 70–105)
GLUCOSE SERPL-MCNC: 154 MG/DL (ref 70–105)
GLUCOSE SERPL-MCNC: 184 MG/DL (ref 74–106)
GLUCOSE SERPL-MCNC: 216 MG/DL (ref 70–105)
GLUCOSE SERPL-MCNC: 65 MG/DL (ref 74–106)
GLUCOSE SERPL-MCNC: 89 MG/DL (ref 70–105)
HAPTOGLOB SERPL NEPH-MCNC: 210 MG/DL (ref 30–200)
HBV CORE AB SERPL QL IA: NEGATIVE
HCO3 UR-SCNC: 29.9 MMOL/L (ref 24–28)
HCT VFR BLD AUTO: 13.9 % (ref 38–47)
HCT VFR BLD AUTO: 16.9 % (ref 38–47)
HCT VFR BLD AUTO: 20.9 % (ref 38–47)
HCV RNA SERPL NAA+PROBE-ACNC: NORMAL IU/ML
HGB BLD-MCNC: 5 G/DL (ref 12–16)
HGB BLD-MCNC: 6.1 G/DL (ref 12–16)
HGB BLD-MCNC: 7.9 G/DL (ref 12–16)
HYPOCHROMIA BLD QL SMEAR: ABNORMAL
IMM GRANULOCYTES # BLD AUTO: 6.78 K/UL (ref 0–0.04)
IMM GRANULOCYTES # BLD AUTO: 9.48 K/UL (ref 0–0.04)
IMM GRANULOCYTES NFR BLD: 12.3 % (ref 0–0.4)
IMM GRANULOCYTES NFR BLD: 17.7 % (ref 0–0.4)
IMM RETICS NFR: 18.4 % (ref 3–15.9)
INR BLD: 2.71 (ref 0.9–1.1)
INTERVENTRICULAR SEPTUM: 1.86 CM (ref 0.6–1.1)
IVC OSTIUM: 0.7 CM
LAB AP GROSS DESCRIPTION: NORMAL
LAB AP LABORATORY NOTES: NORMAL
LACTATE SERPL-SCNC: 15.3 MMOL/L (ref 0.4–2)
LACTATE SERPL-SCNC: 8.1 MMOL/L (ref 0.4–2)
LACTATE SERPL-SCNC: 9.9 MMOL/L (ref 0.4–2)
LDH SERPL-CCNC: 9983 U/L (ref 87–241)
LEFT ATRIUM SIZE: 2 CM
LEFT INTERNAL DIMENSION IN SYSTOLE: 1.71 CM (ref 2.1–4)
LEFT VENTRICLE DIASTOLIC VOLUME INDEX: 25.84 ML/M2
LEFT VENTRICLE DIASTOLIC VOLUME: 47.8 ML
LEFT VENTRICLE MASS INDEX: 137 G/M2
LEFT VENTRICLE SYSTOLIC VOLUME INDEX: 9.2 ML/M2
LEFT VENTRICLE SYSTOLIC VOLUME: 17.1 ML
LEFT VENTRICULAR INTERNAL DIMENSION IN DIASTOLE: 3.5 CM (ref 3.5–6)
LEFT VENTRICULAR MASS: 252.76 G
LVOT MG: 6 MMHG
LYMPHOCYTES # BLD AUTO: 1.65 K/UL (ref 1–4.8)
LYMPHOCYTES # BLD AUTO: 2.12 K/UL (ref 1–4.8)
LYMPHOCYTES NFR BLD AUTO: 3.1 % (ref 27–41)
LYMPHOCYTES NFR BLD AUTO: 3.8 % (ref 27–41)
LYMPHOCYTES NFR BLD MANUAL: 4 % (ref 27–41)
LYMPHOCYTES NFR BLD MANUAL: 7 % (ref 27–41)
MCH RBC QN AUTO: 27.8 PG (ref 27–31)
MCH RBC QN AUTO: 28.2 PG (ref 27–31)
MCHC RBC AUTO-ENTMCNC: 36 G/DL (ref 32–36)
MCHC RBC AUTO-ENTMCNC: 36.1 G/DL (ref 32–36)
MCV RBC AUTO: 77.2 FL (ref 80–96)
MCV RBC AUTO: 78.2 FL (ref 80–96)
METAMYELOCYTES NFR BLD MANUAL: 2 %
MONOCYTES # BLD AUTO: 1.38 K/UL (ref 0–0.8)
MONOCYTES # BLD AUTO: 1.48 K/UL (ref 0–0.8)
MONOCYTES NFR BLD AUTO: 2.5 % (ref 2–6)
MONOCYTES NFR BLD AUTO: 2.8 % (ref 2–6)
MONOCYTES NFR BLD MANUAL: 2 % (ref 2–6)
MONOCYTES NFR BLD MANUAL: 3 % (ref 2–6)
MPC BLD CALC-MCNC: 11.8 FL (ref 9.4–12.4)
MPC BLD CALC-MCNC: 12 FL (ref 9.4–12.4)
MV PEAK E VEL: 0.6 M/S
MYELOCYTES NFR BLD MANUAL: 1 %
MYELOCYTES NFR BLD MANUAL: 2 %
NEUTROPHILS # BLD AUTO: 41 K/UL (ref 1.8–7.7)
NEUTROPHILS # BLD AUTO: 44.82 K/UL (ref 1.8–7.7)
NEUTROPHILS NFR BLD AUTO: 76.3 % (ref 53–65)
NEUTROPHILS NFR BLD AUTO: 81.3 % (ref 53–65)
NEUTS BAND NFR BLD MANUAL: 21 % (ref 1–5)
NEUTS BAND NFR BLD MANUAL: 3 % (ref 1–5)
NEUTS SEG NFR BLD MANUAL: 69 % (ref 50–62)
NEUTS SEG NFR BLD MANUAL: 85 % (ref 50–62)
NRBC # BLD AUTO: 0.19 X10E3/UL
NRBC # BLD AUTO: 0.25 X10E3/UL
NRBC BLD MANUAL-RTO: 1 /100 WBC
NRBC, AUTO (.00): 0.4 %
NRBC, AUTO (.00): 0.5 %
OVALOCYTES BLD QL SMEAR: ABNORMAL
OVALOCYTES BLD QL SMEAR: ABNORMAL
PCO2 BLDA: 35 MMHG (ref 41–51)
PH SMN: 7.54 [PH] (ref 7.32–7.42)
PLATELET # BLD AUTO: 94 K/UL (ref 150–400)
PLATELET # BLD AUTO: 96 K/UL (ref 150–400)
PLATELET MORPHOLOGY: ABNORMAL
PLATELET MORPHOLOGY: ABNORMAL
PO2 BLDA: 68 MMHG (ref 25–40)
POC BASE EXCESS: 7.2 MMOL/L (ref -2–3)
POC SATURATED O2: 95 % (ref 40–70)
POLYCHROMASIA BLD QL SMEAR: ABNORMAL
POTASSIUM SERPL-SCNC: 4.1 MMOL/L (ref 3.5–5.1)
POTASSIUM SERPL-SCNC: 5.3 MMOL/L (ref 3.5–5.1)
POTASSIUM SERPL-SCNC: 5.3 MMOL/L (ref 3.5–5.1)
PROMYELOCYTES NFR BLD MANUAL: 1 %
PROT SERPL-MCNC: 3.9 G/DL (ref 6.4–8.2)
PROT SERPL-MCNC: 4.1 G/DL (ref 6.4–8.2)
PROT SERPL-MCNC: 4.6 G/DL (ref 6.4–8.2)
PROTHROMBIN TIME: 28.1 SECONDS (ref 11.7–14.7)
RA MAJOR: 2 CM
RA PRESSURE: 3 MMHG
RBC # BLD AUTO: 1.8 M/UL (ref 4.2–5.4)
RBC # BLD AUTO: 2.16 M/UL (ref 4.2–5.4)
RETICS # AUTO: 0.01 X10E6/UL (ref 0.02–0.11)
RETICS/RBC NFR AUTO: 0.3 % (ref 0.4–2.2)
RIGHT VENTRICULAR END-DIASTOLIC DIMENSION: 2.9 CM
SODIUM SERPL-SCNC: 133 MMOL/L (ref 136–145)
SODIUM SERPL-SCNC: 138 MMOL/L (ref 136–145)
SODIUM SERPL-SCNC: 143 MMOL/L (ref 136–145)
T3RU NFR SERPL: NORMAL %
TARGETS BLD QL SMEAR: ABNORMAL
TARGETS BLD QL SMEAR: ABNORMAL
TRICUSPID ANNULAR PLANE SYSTOLIC EXCURSION: 1.9 CM
UNIT NUMBER: NORMAL
UNIT NUMBER: NORMAL
WBC # BLD AUTO: 53.68 K/UL (ref 4.5–11)
WBC # BLD AUTO: 55.15 K/UL (ref 4.5–11)

## 2022-01-06 PROCEDURE — 63600175 PHARM REV CODE 636 W HCPCS: Performed by: INTERNAL MEDICINE

## 2022-01-06 PROCEDURE — 99291 CRITICAL CARE FIRST HOUR: CPT | Mod: ,,, | Performed by: INTERNAL MEDICINE

## 2022-01-06 PROCEDURE — 82140 ASSAY OF AMMONIA: CPT | Performed by: STUDENT IN AN ORGANIZED HEALTH CARE EDUCATION/TRAINING PROGRAM

## 2022-01-06 PROCEDURE — 82803 BLOOD GASES ANY COMBINATION: CPT

## 2022-01-06 PROCEDURE — 25000003 PHARM REV CODE 250: Performed by: FAMILY MEDICINE

## 2022-01-06 PROCEDURE — A4217 STERILE WATER/SALINE, 500 ML: HCPCS | Performed by: FAMILY MEDICINE

## 2022-01-06 PROCEDURE — 36415 COLL VENOUS BLD VENIPUNCTURE: CPT | Performed by: STUDENT IN AN ORGANIZED HEALTH CARE EDUCATION/TRAINING PROGRAM

## 2022-01-06 PROCEDURE — 83605 ASSAY OF LACTIC ACID: CPT | Performed by: INTERNAL MEDICINE

## 2022-01-06 PROCEDURE — 83010 ASSAY OF HAPTOGLOBIN QUANT: CPT | Performed by: INTERNAL MEDICINE

## 2022-01-06 PROCEDURE — P9016 RBC LEUKOCYTES REDUCED: HCPCS | Performed by: FAMILY MEDICINE

## 2022-01-06 PROCEDURE — 25000003 PHARM REV CODE 250: Performed by: INTERNAL MEDICINE

## 2022-01-06 PROCEDURE — 25500020 PHARM REV CODE 255: Performed by: INTERNAL MEDICINE

## 2022-01-06 PROCEDURE — 82962 GLUCOSE BLOOD TEST: CPT

## 2022-01-06 PROCEDURE — 85014 HEMATOCRIT: CPT | Performed by: INTERNAL MEDICINE

## 2022-01-06 PROCEDURE — 85045 AUTOMATED RETICULOCYTE COUNT: CPT | Performed by: INTERNAL MEDICINE

## 2022-01-06 PROCEDURE — S0030 INJECTION, METRONIDAZOLE: HCPCS | Performed by: INTERNAL MEDICINE

## 2022-01-06 PROCEDURE — 83605 ASSAY OF LACTIC ACID: CPT | Performed by: FAMILY MEDICINE

## 2022-01-06 PROCEDURE — C9113 INJ PANTOPRAZOLE SODIUM, VIA: HCPCS | Performed by: INTERNAL MEDICINE

## 2022-01-06 PROCEDURE — 36600 WITHDRAWAL OF ARTERIAL BLOOD: CPT

## 2022-01-06 PROCEDURE — 80053 COMPREHEN METABOLIC PANEL: CPT | Performed by: INTERNAL MEDICINE

## 2022-01-06 PROCEDURE — 99900035 HC TECH TIME PER 15 MIN (STAT)

## 2022-01-06 PROCEDURE — 87493 C DIFF AMPLIFIED PROBE: CPT | Performed by: STUDENT IN AN ORGANIZED HEALTH CARE EDUCATION/TRAINING PROGRAM

## 2022-01-06 PROCEDURE — 94761 N-INVAS EAR/PLS OXIMETRY MLT: CPT

## 2022-01-06 PROCEDURE — 36430 TRANSFUSION BLD/BLD COMPNT: CPT

## 2022-01-06 PROCEDURE — 85384 FIBRINOGEN ACTIVITY: CPT | Performed by: FAMILY MEDICINE

## 2022-01-06 PROCEDURE — 27000221 HC OXYGEN, UP TO 24 HOURS

## 2022-01-06 PROCEDURE — 85730 THROMBOPLASTIN TIME PARTIAL: CPT | Performed by: FAMILY MEDICINE

## 2022-01-06 PROCEDURE — 82330 ASSAY OF CALCIUM: CPT | Performed by: INTERNAL MEDICINE

## 2022-01-06 PROCEDURE — 83615 LACTATE (LD) (LDH) ENZYME: CPT | Performed by: FAMILY MEDICINE

## 2022-01-06 PROCEDURE — 85025 COMPLETE CBC W/AUTO DIFF WBC: CPT | Performed by: STUDENT IN AN ORGANIZED HEALTH CARE EDUCATION/TRAINING PROGRAM

## 2022-01-06 PROCEDURE — 25000003 PHARM REV CODE 250

## 2022-01-06 PROCEDURE — P9016 RBC LEUKOCYTES REDUCED: HCPCS | Performed by: INTERNAL MEDICINE

## 2022-01-06 PROCEDURE — 80100014 HC HEMODIALYSIS 1:1

## 2022-01-06 PROCEDURE — 20000000 HC ICU ROOM

## 2022-01-06 PROCEDURE — 36415 COLL VENOUS BLD VENIPUNCTURE: CPT | Performed by: FAMILY MEDICINE

## 2022-01-06 PROCEDURE — 99291 PR CRITICAL CARE, E/M 30-74 MINUTES: ICD-10-PCS | Mod: ,,, | Performed by: INTERNAL MEDICINE

## 2022-01-06 RX ORDER — GLUCAGON 1 MG
1 KIT INJECTION
Status: DISCONTINUED | OUTPATIENT
Start: 2022-01-06 | End: 2022-01-08 | Stop reason: HOSPADM

## 2022-01-06 RX ORDER — HYDROCODONE BITARTRATE AND ACETAMINOPHEN 500; 5 MG/1; MG/1
TABLET ORAL
Status: DISCONTINUED | OUTPATIENT
Start: 2022-01-06 | End: 2022-01-07

## 2022-01-06 RX ORDER — METRONIDAZOLE 500 MG/100ML
500 INJECTION, SOLUTION INTRAVENOUS
Status: DISCONTINUED | OUTPATIENT
Start: 2022-01-06 | End: 2022-01-07

## 2022-01-06 RX ORDER — SODIUM BICARBONATE 1 MEQ/ML
50 SYRINGE (ML) INTRAVENOUS ONCE
Status: COMPLETED | OUTPATIENT
Start: 2022-01-06 | End: 2022-01-06

## 2022-01-06 RX ORDER — INSULIN ASPART 100 [IU]/ML
0-5 INJECTION, SOLUTION INTRAVENOUS; SUBCUTANEOUS
Status: DISCONTINUED | OUTPATIENT
Start: 2022-01-06 | End: 2022-01-08 | Stop reason: HOSPADM

## 2022-01-06 RX ORDER — SODIUM BICARBONATE 1 MEQ/ML
SYRINGE (ML) INTRAVENOUS
Status: COMPLETED
Start: 2022-01-06 | End: 2022-01-06

## 2022-01-06 RX ORDER — SODIUM BICARBONATE 1 MEQ/ML
50 SYRINGE (ML) INTRAVENOUS ONCE
Status: DISCONTINUED | OUTPATIENT
Start: 2022-01-06 | End: 2022-01-08 | Stop reason: HOSPADM

## 2022-01-06 RX ORDER — SODIUM CHLORIDE 9 MG/ML
INJECTION, SOLUTION INTRAVENOUS
Status: DISPENSED
Start: 2022-01-06 | End: 2022-01-06

## 2022-01-06 RX ADMIN — LINEZOLID 600 MG: 600 INJECTION, SOLUTION INTRAVENOUS at 05:01

## 2022-01-06 RX ADMIN — METRONIDAZOLE 500 MG: 500 INJECTION, SOLUTION INTRAVENOUS at 08:01

## 2022-01-06 RX ADMIN — MUPIROCIN: 20 OINTMENT TOPICAL at 08:01

## 2022-01-06 RX ADMIN — NOREPINEPHRINE BITARTRATE 0.02 MCG/KG/MIN: 1 SOLUTION INTRAVENOUS at 03:01

## 2022-01-06 RX ADMIN — NOREPINEPHRINE BITARTRATE 0.18 MCG/KG/MIN: 1 SOLUTION INTRAVENOUS at 03:01

## 2022-01-06 RX ADMIN — PIPERACILLIN AND TAZOBACTAM 4.5 G: 4; .5 INJECTION, POWDER, LYOPHILIZED, FOR SOLUTION INTRAVENOUS; PARENTERAL at 05:01

## 2022-01-06 RX ADMIN — METRONIDAZOLE 500 MG: 500 INJECTION, SOLUTION INTRAVENOUS at 11:01

## 2022-01-06 RX ADMIN — PERFLUTREN 1.5 ML: 6.52 INJECTION, SUSPENSION INTRAVENOUS at 11:01

## 2022-01-06 RX ADMIN — SODIUM BICARBONATE: 84 INJECTION, SOLUTION INTRAVENOUS at 10:01

## 2022-01-06 RX ADMIN — NOREPINEPHRINE BITARTRATE 0.1 MCG/KG/MIN: 1 SOLUTION INTRAVENOUS at 10:01

## 2022-01-06 RX ADMIN — HEPARIN SODIUM 5000 UNITS: 5000 INJECTION INTRAVENOUS; SUBCUTANEOUS at 09:01

## 2022-01-06 RX ADMIN — CALCIUM GLUCONATE 2 G: 98 INJECTION, SOLUTION INTRAVENOUS at 03:01

## 2022-01-06 RX ADMIN — SODIUM CHLORIDE 500 ML: 9 INJECTION, SOLUTION INTRAVENOUS at 02:01

## 2022-01-06 RX ADMIN — SODIUM BICARBONATE 50 MEQ: 84 INJECTION, SOLUTION INTRAVENOUS at 12:01

## 2022-01-06 RX ADMIN — PANTOPRAZOLE SODIUM 40 MG: 40 INJECTION, POWDER, FOR SOLUTION INTRAVENOUS at 08:01

## 2022-01-06 RX ADMIN — DEXTROSE MONOHYDRATE 25 G: 500 INJECTION PARENTERAL at 01:01

## 2022-01-06 RX ADMIN — NOREPINEPHRINE BITARTRATE 0.14 MCG/KG/MIN: 1 SOLUTION INTRAVENOUS at 10:01

## 2022-01-06 RX ADMIN — SODIUM BICARBONATE 50 MEQ: 84 INJECTION, SOLUTION INTRAVENOUS at 01:01

## 2022-01-06 RX ADMIN — HEPARIN SODIUM 5000 UNITS: 5000 INJECTION INTRAVENOUS; SUBCUTANEOUS at 08:01

## 2022-01-06 RX ADMIN — SODIUM BICARBONATE: 84 INJECTION, SOLUTION INTRAVENOUS at 01:01

## 2022-01-06 RX ADMIN — PANTOPRAZOLE SODIUM 40 MG: 40 INJECTION, POWDER, FOR SOLUTION INTRAVENOUS at 09:01

## 2022-01-06 RX ADMIN — SODIUM CHLORIDE: 9 INJECTION, SOLUTION INTRAVENOUS at 09:01

## 2022-01-06 RX ADMIN — CALCIUM GLUCONATE 2 G: 98 INJECTION, SOLUTION INTRAVENOUS at 11:01

## 2022-01-06 RX ADMIN — MUPIROCIN: 20 OINTMENT TOPICAL at 09:01

## 2022-01-06 RX ADMIN — HEPARIN SODIUM 4000 UNITS: 1000 INJECTION INTRAVENOUS; SUBCUTANEOUS at 01:01

## 2022-01-06 NOTE — SUBJECTIVE & OBJECTIVE
Interval History:  The increased altered mental status; requiring pressors currently.  Patient responding to pain and occasionally opens eyes to her name    Medications:  Continuous Infusions:   NORepinephrine bitartrate-D5W 0.104 mcg/kg/min (01/06/22 1048)    sodium bicarbonate drip 125 mL/hr at 01/06/22 1047     Scheduled Meds:   heparin (porcine)  5,000 Units Subcutaneous Q12H    linezolid  600 mg Intravenous Q12H    metronidazole  500 mg Intravenous Q8H    mupirocin   Nasal BID    pantoprazole  40 mg Intravenous BID    piperacillin-tazobactam (ZOSYN) IVPB  4.5 g Intravenous Q12H    sodium bicarbonate  50 mEq Intravenous Once     PRN Meds:sodium chloride, sodium chloride, bisacodyL, dextromethorphan-guaiFENesin  mg/5 ml, dextrose 50%, dextrose 50%, dextrose 50%, glucagon (human recombinant), heparin (porcine), insulin aspart U-100, ondansetron, ondansetron, oxyCODONE, prochlorperazine, simethicone     Review of patient's allergies indicates:  No Known Allergies  Objective:     Vital Signs (Most Recent):  Temp: (P) 97.8 °F (36.6 °C) (01/06/22 1050)  Pulse: 108 (01/06/22 0700)  Resp: (!) 37 (01/06/22 0700)  BP: (!) 89/63 (01/06/22 0700)  SpO2: 100 % (01/06/22 0700) Vital Signs (24h Range):  Temp:  [96.4 °F (35.8 °C)-97.8 °F (36.6 °C)] (P) 97.8 °F (36.6 °C)  Pulse:  [] 108  Resp:  [14-56] 37  SpO2:  [59 %-100 %] 100 %  BP: ()/() 89/63     Weight: 89.3 kg (196 lb 13.9 oz)  Body mass index is 38.45 kg/m².    Intake/Output - Last 3 Shifts       01/04 0700  01/05 0659 01/05 0700 01/06 0659 01/06 0700 01/07 0659    P.O.       I.V. (mL/kg)  596.6 (6.7)     Blood 679.8 350     Other 700 700     IV Piggyback 700 2400     Total Intake(mL/kg) 2079.8 (24.3) 4046.6 (45.3)     Urine (mL/kg/hr) 35 (0) 50 (0)     Other 1070 500     Stool  1     Blood  150     Total Output 1105 701     Net +974.8 +3345.6                  Physical Exam  Constitutional:       Appearance: She is ill-appearing and  toxic-appearing.   Eyes:      General: Scleral icterus present.      Pupils: Pupils are equal, round, and reactive to light.   Cardiovascular:      Rate and Rhythm: Tachycardia present.   Pulmonary:      Effort: Tachypnea present.   Abdominal:      General: There is no distension.      Tenderness: There is abdominal tenderness.   Genitourinary:     Comments: Large dressing in place; no significant saturation on dressing; Steele catheter in place  Neurological:      Mental Status: She is disoriented.      GCS: GCS eye subscore is 3. GCS verbal subscore is 3. GCS motor subscore is 4.         Significant Labs:  I have reviewed all pertinent lab results within the past 24 hours.  CBC:   Recent Labs   Lab 01/06/22 0618   WBC 53.68*   RBC 2.16*   HGB 6.1*   HCT 16.9*   PLT 94*   MCV 78.2*   MCH 28.2   MCHC 36.1*     BMP:   Recent Labs   Lab 01/06/22 0618   *      K 5.3*   CL 94*   CO2 14*   BUN 47*   CREATININE 4.58*   CALCIUM 7.0*     LFTs:   Recent Labs   Lab 01/06/22 0618   ALT 1,585*   AST 7,076*   ALKPHOS 120*   BILITOT 3.2*   PROT 4.1*   ALBUMIN 0.6*       Significant Diagnostics:  I have reviewed all pertinent imaging results/findings within the past 24 hours.

## 2022-01-06 NOTE — ASSESSMENT & PLAN NOTE
- lactate 15 this am secondary to sepsis  - supportive care  - on bicarbonate infusion that was initiated overnight

## 2022-01-06 NOTE — PLAN OF CARE
Problem: Adult Inpatient Plan of Care  Goal: Plan of Care Review  Outcome: Ongoing, Progressing  Goal: Patient-Specific Goal (Individualized)  Outcome: Ongoing, Progressing  Goal: Absence of Hospital-Acquired Illness or Injury  Outcome: Ongoing, Progressing  Goal: Optimal Comfort and Wellbeing  Outcome: Ongoing, Progressing  Goal: Readiness for Transition of Care  Outcome: Ongoing, Progressing     Problem: Bariatric Environmental Safety  Goal: Safety Maintained with Care  Outcome: Ongoing, Progressing     Problem: Infection  Goal: Absence of Infection Signs and Symptoms  Outcome: Ongoing, Progressing     Problem: Fall Injury Risk  Goal: Absence of Fall and Fall-Related Injury  Outcome: Ongoing, Progressing  Intervention: Promote Injury-Free Environment  Flowsheets (Taken 1/6/2022 0400)  Safety Promotion/Fall Prevention:   pulse ox   room near unit station   medications reviewed     Problem: Skin Injury Risk Increased  Goal: Skin Health and Integrity  Outcome: Ongoing, Progressing  Intervention: Optimize Skin Protection  Flowsheets (Taken 1/6/2022 3508)  Pressure Reduction Techniques: weight shift assistance provided  Pressure Reduction Devices:   heel offloading device utilized   positioning supports utilized  Head of Bed (HOB) Positioning: HOB at 30-45 degrees     Problem: Fluid and Electrolyte Imbalance (Acute Kidney Injury/Impairment)  Goal: Fluid and Electrolyte Balance  Outcome: Ongoing, Progressing     Problem: Oral Intake Inadequate (Acute Kidney Injury/Impairment)  Goal: Optimal Nutrition Intake  Outcome: Ongoing, Progressing     Problem: Renal Function Impairment (Acute Kidney Injury/Impairment)  Goal: Effective Renal Function  Outcome: Ongoing, Progressing     Problem: Device-Related Complication Risk (Hemodialysis)  Goal: Safe, Effective Therapy Delivery  Outcome: Ongoing, Progressing  Intervention: Optimize Device Care and Function  Flowsheets (Taken 1/6/2022 7713)  Medication Review/Management:  medications reviewed     Problem: Hemodynamic Instability (Hemodialysis)  Goal: Effective Tissue Perfusion  Outcome: Ongoing, Progressing     Problem: Infection (Hemodialysis)  Goal: Absence of Infection Signs and Symptoms  Outcome: Ongoing, Progressing     Problem: Adjustment to Illness (Sepsis/Septic Shock)  Goal: Optimal Coping  Outcome: Ongoing, Progressing     Problem: Bleeding (Sepsis/Septic Shock)  Goal: Absence of Bleeding  Outcome: Ongoing, Progressing     Problem: Glycemic Control Impaired (Sepsis/Septic Shock)  Goal: Blood Glucose Level Within Desired Range  Outcome: Ongoing, Progressing     Problem: Infection Progression (Sepsis/Septic Shock)  Goal: Absence of Infection Signs and Symptoms  Outcome: Ongoing, Progressing     Problem: Nutrition Impaired (Sepsis/Septic Shock)  Goal: Optimal Nutrition Intake  Outcome: Ongoing, Progressing

## 2022-01-06 NOTE — DIALYSIS ROUNDING
Patient is seen on hemodialysis, she is tolerating this well, we will continue her treatment unchanged.

## 2022-01-06 NOTE — ASSESSMENT & PLAN NOTE
- H/H down to 6.1, 1 unit of PRBCs on HD   - repeat CBC   - marrow failure with low reticulocyte count  - hemolysis labs negative, coagulation values are becoming abnormal  - ferritin above detectable limit, suspect HLH. Continue support with transfusions

## 2022-01-06 NOTE — ASSESSMENT & PLAN NOTE
- S/P debridement on 01/1, 01/03, 1/5  - on clindamycin; she had 2 does of IV vancomycin. Now changed to zyvox and zosyn  - ID consulted and following

## 2022-01-06 NOTE — ASSESSMENT & PLAN NOTE
To the OR for debridement of left thigh, groin and pubic region.    Risks and benefits explained with the patient including risks for infection, bleeding, injury to surrounding structures, hematoma/seroma formation with need for possible evacuation, possible open.  The patient verbalized understanding, agrees and wishes to proceed with surgery.    We will add clindamycin IV; continue Zosyn and vancomycin until cultures speciate.    NPO; Starting Heparin SQ q8; stop Eliquis for now (patient has not received a dose yet).    1/2:  Continue antibiotics.  Continue PCA.  Plan for OR tomorrow for debridement washout    1/3: Continued debridement    1/4: patient Oliguric with acute renal failure; To the OR for Tunneled Hemodialysis catheter today; will take back tomorrow for debridement    1/5: continued debridement; extension of necrosis    1/6: Patient deteriorating, requiring pressors; +AMS with GCS 10. Multiorgan system failure with shock liver and ARF. Checking ammonia levels. Leukocytosis in 50Ks. Increased diarrhea; place fecal management system and check for C-Diff; will treat for C-Diff empirically.  If stable, will take back to OR tomorrow for debridement

## 2022-01-06 NOTE — NURSING
Rec'd call from lab([facility]) with lactic acid of 17.7, MD was notified of results. Lab will do a repeat on the lactic acid, MD aware.

## 2022-01-06 NOTE — PLAN OF CARE
Problem: Adult Inpatient Plan of Care  Goal: Plan of Care Review  Outcome: Ongoing, Progressing  Goal: Patient-Specific Goal (Individualized)  Outcome: Ongoing, Progressing  Goal: Absence of Hospital-Acquired Illness or Injury  Outcome: Ongoing, Progressing  Goal: Optimal Comfort and Wellbeing  Outcome: Ongoing, Progressing  Goal: Readiness for Transition of Care  Outcome: Ongoing, Progressing     Problem: Bariatric Environmental Safety  Goal: Safety Maintained with Care  Outcome: Ongoing, Progressing     Problem: Infection  Goal: Absence of Infection Signs and Symptoms  Outcome: Ongoing, Progressing     Problem: Fall Injury Risk  Goal: Absence of Fall and Fall-Related Injury  Outcome: Ongoing, Progressing     Problem: Skin Injury Risk Increased  Goal: Skin Health and Integrity  Outcome: Ongoing, Progressing     Problem: Fluid and Electrolyte Imbalance (Acute Kidney Injury/Impairment)  Goal: Fluid and Electrolyte Balance  Outcome: Ongoing, Progressing     Problem: Oral Intake Inadequate (Acute Kidney Injury/Impairment)  Goal: Optimal Nutrition Intake  Outcome: Ongoing, Progressing     Problem: Renal Function Impairment (Acute Kidney Injury/Impairment)  Goal: Effective Renal Function  Outcome: Ongoing, Progressing     Problem: Infection (Hemodialysis)  Goal: Absence of Infection Signs and Symptoms  Outcome: Ongoing, Progressing

## 2022-01-06 NOTE — SUBJECTIVE & OBJECTIVE
Interval History: To OR yesterday for debridement, extensive necrosis. Decompensated with severe metabolic acidosis and multiorgan failure. Now on pressors.     Objective:     Vital Signs (Most Recent):  Temp: 97.7 °F (36.5 °C) (01/06/22 0430)  Pulse: 108 (01/06/22 0700)  Resp: (!) 37 (01/06/22 0700)  BP: (!) 89/63 (01/06/22 0700)  SpO2: 100 % (01/06/22 0700) Vital Signs (24h Range):  Temp:  [96.4 °F (35.8 °C)-97.8 °F (36.6 °C)] 97.7 °F (36.5 °C)  Pulse:  [] 108  Resp:  [14-56] 37  SpO2:  [59 %-100 %] 100 %  BP: ()/() 89/63     Weight: 89.3 kg (196 lb 13.9 oz)  Body mass index is 38.45 kg/m².      Intake/Output Summary (Last 24 hours) at 1/6/2022 1132  Last data filed at 1/6/2022 0606  Gross per 24 hour   Intake 3846.64 ml   Output 701 ml   Net 3145.64 ml       Physical Exam  Vitals reviewed.   Constitutional:       General: She is in acute distress.      Appearance: She is ill-appearing and toxic-appearing.      Comments: Agitated and incomprehensible   HENT:      Head: Normocephalic and atraumatic.      Right Ear: External ear normal.      Left Ear: External ear normal.      Mouth/Throat:      Mouth: Mucous membranes are dry.      Pharynx: Oropharynx is clear.   Eyes:      Conjunctiva/sclera: Conjunctivae normal.      Pupils: Pupils are equal, round, and reactive to light.   Neck:      Comments: Right tunneled HD cath  Cardiovascular:      Rate and Rhythm: Regular rhythm. Tachycardia present.      Heart sounds: Normal heart sounds.   Pulmonary:      Effort: No respiratory distress.      Breath sounds: Normal breath sounds. No wheezing, rhonchi or rales.      Comments: Increased respiratory effort  Abdominal:      General: Abdomen is flat. Bowel sounds are normal.      Palpations: Abdomen is soft.      Tenderness: There is no abdominal tenderness.   Musculoskeletal:      Cervical back: Neck supple.      Right lower leg: No edema.      Left lower leg: No edema.   Lymphadenopathy:      Cervical: No  cervical adenopathy.   Skin:     General: Skin is warm and dry.      Comments: Groin wounds with dressing in place. CDI   Neurological:      General: No focal deficit present.      Mental Status: She is disoriented.      Cranial Nerves: No cranial nerve deficit.      Comments: Moving all extremities. Agitated.          Vents:  Oxygen Concentration (%): 100 (01/06/22 1000)    Lines/Drains/Airways     Central Venous Catheter Line                 Hemodialysis Catheter 01/04/22 1300 right internal jugular 1 day          Drain                 Urethral Catheter 01/03/22 0825 Silicone 3 days          Peripheral Intravenous Line                 Peripheral IV - Single Lumen   20 G Right Antecubital -- days         Peripheral IV - Single Lumen 01/01/22 1718 22 G Posterior;Right Hand 4 days         Peripheral IV - Single Lumen 01/06/22 0200 20 G Lateral;Left Shoulder <1 day         Peripheral IV - Single Lumen 01/06/22 0200 20 G Left;Posterior Hand <1 day                Significant Labs:    CBC/Anemia Profile:  Recent Labs   Lab 01/05/22  0647 01/05/22  2316 01/06/22  0618   WBC 44.37* 55.15* 53.68*   HGB 7.9* 5.0* 6.1*   HCT 21.5* 13.9* 16.9*   * 96* 94*   MCV 73.9* 77.2* 78.2*   RDW 15.5* 16.2* 15.2*   FERRITIN 399*  --   --    RETIC  --   --  0.3*        Chemistries:  Recent Labs   Lab 01/05/22  0647 01/05/22  2316 01/06/22  0618   * 133* 138   K 4.0 5.3* 5.3*   CL 89* 94* 94*   CO2 26 7* 14*   BUN 51* 41* 47*   CREATININE 5.25* 4.42* 4.58*   CALCIUM 7.2* 7.4* 7.0*   ALBUMIN 0.8* 0.5* 0.6*   PROT 5.5* 3.9* 4.1*   BILITOT 3.8* 2.5* 3.2*   ALKPHOS 94 83 120*   ALT 8* 68* 1,585*   AST 43* 277* 7,076*       All pertinent labs within the past 24 hours have been reviewed.    Significant Imaging:  I have reviewed all pertinent imaging results/findings within the past 24 hours.

## 2022-01-06 NOTE — NURSING
@0138 pt became hypotensive and HR became bradycardiac to 58. MD was notified.  @0145 MD here @ bedside, orders rec'd.  @0148 blood sugar was 27, D50 given ivp.

## 2022-01-06 NOTE — PROGRESS NOTES
[facility]  General Surgery  Progress Note    Subjective:     History of Present Illness:  41-year-old  female presented to the ER with complaints of lower left extremity pain in the thigh was spreading up to the groin and pubic area.  Patient states this started on Tuesday and she thought she has had a little folliculitis and this has progressively worsened.  Patient did develop cough few days ago and has tested positive for COVID.  Soft tissue ultrasound performed which showed phlegmon versus early abscess around the area.  This morning there was necrotic skin in the thigh in the pubic area is extremely tender and indurated.  Denies any chest pain or current shortness of breath      Post-Op Info:  Procedure(s) (LRB):  IRRIGATION AND DEBRIDEMENT (N/A)   1 Day Post-Op     Interval History:  The increased altered mental status; requiring pressors currently.  Patient responding to pain and occasionally opens eyes to her name    Medications:  Continuous Infusions:   NORepinephrine bitartrate-D5W 0.104 mcg/kg/min (01/06/22 1048)    sodium bicarbonate drip 125 mL/hr at 01/06/22 1047     Scheduled Meds:   heparin (porcine)  5,000 Units Subcutaneous Q12H    linezolid  600 mg Intravenous Q12H    metronidazole  500 mg Intravenous Q8H    mupirocin   Nasal BID    pantoprazole  40 mg Intravenous BID    piperacillin-tazobactam (ZOSYN) IVPB  4.5 g Intravenous Q12H    sodium bicarbonate  50 mEq Intravenous Once     PRN Meds:sodium chloride, sodium chloride, bisacodyL, dextromethorphan-guaiFENesin  mg/5 ml, dextrose 50%, dextrose 50%, dextrose 50%, glucagon (human recombinant), heparin (porcine), insulin aspart U-100, ondansetron, ondansetron, oxyCODONE, prochlorperazine, simethicone     Review of patient's allergies indicates:  No Known Allergies  Objective:     Vital Signs (Most Recent):  Temp: (P) 97.8 °F (36.6 °C) (01/06/22 1050)  Pulse: 108 (01/06/22 0700)  Resp: (!) 37 (01/06/22  0700)  BP: (!) 89/63 (01/06/22 0700)  SpO2: 100 % (01/06/22 0700) Vital Signs (24h Range):  Temp:  [96.4 °F (35.8 °C)-97.8 °F (36.6 °C)] (P) 97.8 °F (36.6 °C)  Pulse:  [] 108  Resp:  [14-56] 37  SpO2:  [59 %-100 %] 100 %  BP: ()/() 89/63     Weight: 89.3 kg (196 lb 13.9 oz)  Body mass index is 38.45 kg/m².    Intake/Output - Last 3 Shifts       01/04 0700 01/05 0659 01/05 0700 01/06 0659 01/06 0700 01/07 0659    P.O.       I.V. (mL/kg)  596.6 (6.7)     Blood 679.8 350     Other 700 700     IV Piggyback 700 2400     Total Intake(mL/kg) 2079.8 (24.3) 4046.6 (45.3)     Urine (mL/kg/hr) 35 (0) 50 (0)     Other 1070 500     Stool  1     Blood  150     Total Output 1105 701     Net +974.8 +3345.6                  Physical Exam  Constitutional:       Appearance: She is ill-appearing and toxic-appearing.   Eyes:      General: Scleral icterus present.      Pupils: Pupils are equal, round, and reactive to light.   Cardiovascular:      Rate and Rhythm: Tachycardia present.   Pulmonary:      Effort: Tachypnea present.   Abdominal:      General: There is no distension.      Tenderness: There is abdominal tenderness.   Genitourinary:     Comments: Large dressing in place; no significant saturation on dressing; Steele catheter in place  Neurological:      Mental Status: She is disoriented.      GCS: GCS eye subscore is 3. GCS verbal subscore is 3. GCS motor subscore is 4.         Significant Labs:  I have reviewed all pertinent lab results within the past 24 hours.  CBC:   Recent Labs   Lab 01/06/22 0618   WBC 53.68*   RBC 2.16*   HGB 6.1*   HCT 16.9*   PLT 94*   MCV 78.2*   MCH 28.2   MCHC 36.1*     BMP:   Recent Labs   Lab 01/06/22  0618   *      K 5.3*   CL 94*   CO2 14*   BUN 47*   CREATININE 4.58*   CALCIUM 7.0*     LFTs:   Recent Labs   Lab 01/06/22  0618   ALT 1,585*   AST 7,076*   ALKPHOS 120*   BILITOT 3.2*   PROT 4.1*   ALBUMIN 0.6*       Significant Diagnostics:  I have reviewed all  pertinent imaging results/findings within the past 24 hours.    Assessment/Plan:     * Necrotizing fasciitis  To the OR for debridement of left thigh, groin and pubic region.    Risks and benefits explained with the patient including risks for infection, bleeding, injury to surrounding structures, hematoma/seroma formation with need for possible evacuation, possible open.  The patient verbalized understanding, agrees and wishes to proceed with surgery.    We will add clindamycin IV; continue Zosyn and vancomycin until cultures speciate.    NPO; Starting Heparin SQ q8; stop Eliquis for now (patient has not received a dose yet).    1/2:  Continue antibiotics.  Continue PCA.  Plan for OR tomorrow for debridement washout    1/3: Continued debridement    1/4: patient Oliguric with acute renal failure; To the OR for Tunneled Hemodialysis catheter today; will take back tomorrow for debridement    1/5: continued debridement; extension of necrosis    1/6: Patient deteriorating, requiring pressors; +AMS with GCS 10. Multiorgan system failure with shock liver and ARF. Checking ammonia levels. Leukocytosis in 50Ks. Increased diarrhea; place fecal management system and check for C-Diff; will treat for C-Diff empirically.  If stable, will take back to OR tomorrow for debridement    ROSHAN (acute kidney injury)  To OR today for Tunneled Hemodialysis Catheter    Risks and benefits explained with the patient including risks for infection, bleeding, injury to surrounding structures, hematoma/seroma formation with need for possible evacuation, possible open.  The patient verbalized understanding, agrees and wishes to proceed with surgery.        Brandyn Motley, DO  General Surgery  [facility]

## 2022-01-06 NOTE — NURSING
@2330 pt started responding to verbal stimuli but remains confused  @2345 pt became agitated and restless again, arms wailing in the air, talked to pt so I could settle her down.

## 2022-01-06 NOTE — ASSESSMENT & PLAN NOTE
- Cr 1.19 on 01/02-- 7.44--HD line placed   - initiated HD 1/4, remains anuric.  - HD today with 1 units PRBCs   - nephrology consulted

## 2022-01-06 NOTE — ASSESSMENT & PLAN NOTE
- altered today  - combination of septic encephalopathy and severe acidosis  - check ammonia level

## 2022-01-06 NOTE — PROGRESS NOTES
[facility] ICU  Pulmonology  Progress Note    Patient Name: Flor Kelley  MRN: [id number]  Admission Date: 12/31/2021  Hospital Length of Stay: 6 days  Code Status: Full Code  Attending Provider: Nico Muller MD  Primary Care Provider: Primary Doctor No   Principal Problem: Necrotizing fasciitis    Subjective:     Interval History: To OR yesterday for debridement, extensive necrosis. Decompensated with severe metabolic acidosis and multiorgan failure. Now on pressors.     Objective:     Vital Signs (Most Recent):  Temp: 97.7 °F (36.5 °C) (01/06/22 0430)  Pulse: 108 (01/06/22 0700)  Resp: (!) 37 (01/06/22 0700)  BP: (!) 89/63 (01/06/22 0700)  SpO2: 100 % (01/06/22 0700) Vital Signs (24h Range):  Temp:  [96.4 °F (35.8 °C)-97.8 °F (36.6 °C)] 97.7 °F (36.5 °C)  Pulse:  [] 108  Resp:  [14-56] 37  SpO2:  [59 %-100 %] 100 %  BP: ()/() 89/63     Weight: 89.3 kg (196 lb 13.9 oz)  Body mass index is 38.45 kg/m².      Intake/Output Summary (Last 24 hours) at 1/6/2022 1132  Last data filed at 1/6/2022 0606  Gross per 24 hour   Intake 3846.64 ml   Output 701 ml   Net 3145.64 ml       Physical Exam  Vitals reviewed.   Constitutional:       General: She is in acute distress.      Appearance: She is ill-appearing and toxic-appearing.      Comments: Agitated and incomprehensible   HENT:      Head: Normocephalic and atraumatic.      Right Ear: External ear normal.      Left Ear: External ear normal.      Mouth/Throat:      Mouth: Mucous membranes are dry.      Pharynx: Oropharynx is clear.   Eyes:      Conjunctiva/sclera: Conjunctivae normal.      Pupils: Pupils are equal, round, and reactive to light.   Neck:      Comments: Right tunneled HD cath  Cardiovascular:      Rate and Rhythm: Regular rhythm. Tachycardia present.      Heart sounds: Normal heart sounds.   Pulmonary:      Effort: No respiratory distress.      Breath sounds: Normal breath sounds. No wheezing, rhonchi or rales.      Comments:  Increased respiratory effort  Abdominal:      General: Abdomen is flat. Bowel sounds are normal.      Palpations: Abdomen is soft.      Tenderness: There is no abdominal tenderness.   Musculoskeletal:      Cervical back: Neck supple.      Right lower leg: No edema.      Left lower leg: No edema.   Lymphadenopathy:      Cervical: No cervical adenopathy.   Skin:     General: Skin is warm and dry.      Comments: Groin wounds with dressing in place. CDI   Neurological:      General: No focal deficit present.      Mental Status: She is disoriented.      Cranial Nerves: No cranial nerve deficit.      Comments: Moving all extremities. Agitated.          Vents:  Oxygen Concentration (%): 100 (01/06/22 1000)    Lines/Drains/Airways     Central Venous Catheter Line                 Hemodialysis Catheter 01/04/22 1300 right internal jugular 1 day          Drain                 Urethral Catheter 01/03/22 0825 Silicone 3 days          Peripheral Intravenous Line                 Peripheral IV - Single Lumen   20 G Right Antecubital -- days         Peripheral IV - Single Lumen 01/01/22 1718 22 G Posterior;Right Hand 4 days         Peripheral IV - Single Lumen 01/06/22 0200 20 G Lateral;Left Shoulder <1 day         Peripheral IV - Single Lumen 01/06/22 0200 20 G Left;Posterior Hand <1 day                Significant Labs:    CBC/Anemia Profile:  Recent Labs   Lab 01/05/22  0647 01/05/22  2316 01/06/22  0618   WBC 44.37* 55.15* 53.68*   HGB 7.9* 5.0* 6.1*   HCT 21.5* 13.9* 16.9*   * 96* 94*   MCV 73.9* 77.2* 78.2*   RDW 15.5* 16.2* 15.2*   FERRITIN 399*  --   --    RETIC  --   --  0.3*        Chemistries:  Recent Labs   Lab 01/05/22  0647 01/05/22  2316 01/06/22  0618   * 133* 138   K 4.0 5.3* 5.3*   CL 89* 94* 94*   CO2 26 7* 14*   BUN 51* 41* 47*   CREATININE 5.25* 4.42* 4.58*   CALCIUM 7.2* 7.4* 7.0*   ALBUMIN 0.8* 0.5* 0.6*   PROT 5.5* 3.9* 4.1*   BILITOT 3.8* 2.5* 3.2*   ALKPHOS 94 83 120*   ALT 8* 68* 1,585*   AST  43* 277* 7,076*       All pertinent labs within the past 24 hours have been reviewed.    Significant Imaging:  I have reviewed all pertinent imaging results/findings within the past 24 hours.    Assessment/Plan:     * Necrotizing fasciitis  - S/P debridement on 01/1, 01/03, 1/5  - on clindamycin; she had 2 does of IV vancomycin. Now changed to zyvox and zosyn  - ID consulted and following       Lactic acidosis  - lactate 15 this am secondary to sepsis  - supportive care  - on bicarbonate infusion that was initiated overnight    Shock liver  - lfts uptrending in setting of septic shock  - continue to trend along with coags    Septic shock  - Secondary to nec fascitis   - clinda changed to zyvox and zosyn. All cultures with MRSA  - 12/31- BC x 2 NGTD   - lactate elevated, on bicarbonate. Did not give 30cc/kg due to anuric renal failure  - trend lactate q4h  - support with levophed  - add empiric flagyl for C diff, until resulted. Unable to take PO vancomycin due to mental state    Acute blood loss anemia  - H/H down to 6.1, 1 unit of PRBCs on HD   - repeat CBC   - marrow failure with low reticulocyte count  - hemolysis labs negative, coagulation values are becoming abnormal  - ferritin above detectable limit, suspect HLH. Continue support with transfusions    Hepatitis C  - Hep C antibody positive  - PCR not done, will not drastically change course at this time    Encephalopathy, metabolic  - altered today  - combination of septic encephalopathy and severe acidosis  - check ammonia level    ROSHAN (acute kidney injury)  - Cr 1.19 on 01/02-- 7.44--HD line placed   - initiated HD 1/4, remains anuric.  - HD today with 1 units PRBCs   - nephrology consulted     COVID-19  - Positive 12/31 - not vaccinated - exposed on 12/25   - monoclonial antibodies on admission  - on NC   - xray reviewed   - continue isolation for a total of 10 days - this may change as CDC guidelines change       She is critically ill with septic shock  from necrotizing fasciitis from MRSA. Multiorgan failure with severe lactic acidosis. I have spoke with her children regarding the current state of her illness. Continue support to include mechanical ventilation if necessary. Critical care time: 55 minutes.        Nico Muller MD  Pulmonology  [facility]

## 2022-01-06 NOTE — PROGRESS NOTES
Pts boy friend to call, didn't have new password, explained that pete changed it, states he will notify him for password

## 2022-01-06 NOTE — PROGRESS NOTES
Rec'd pt on room  air non labored, shaking head back and forth, unable to speak name. Levophed inf at 0.1mcg/kg/min. bicarb gtt infusing, bess with scan urine, will cont to monitor

## 2022-01-06 NOTE — ASSESSMENT & PLAN NOTE
- Secondary to nec fascitis   - clinda changed to zyvox and zosyn. All cultures with MRSA  - 12/31- BC x 2 NGTD   - lactate elevated, on bicarbonate. Did not give 30cc/kg due to anuric renal failure  - trend lactate q4h  - support with levophed  - add empiric flagyl for C diff, until resulted. Unable to take PO vancomycin due to mental state

## 2022-01-06 NOTE — NURSING
@ 2235 pt became restless and agitated, pulling at lines, iv pulled out, cleaned up pt, changed all bed linens and gown, bandages reinforced, all of a sudden pt became unresponsive to stimuli with increased and labored respirations.  @2238  was notified and made aware of pt status.  @2240  here @ bedside, orders were rec'd.  @2304 blood sugar checked , it was 23  @2307 D50 given  @2313 ABG's done and MD was notified  @2315 re-check bs was 216

## 2022-01-06 NOTE — SIGNIFICANT EVENT
Call coverage note.    Patient with eventful night.  Began with patient becoming unresponsive which nurse reported was a significant change.. Labs ordered and bedside glucose 23, given an amp of D50.  ABG 7.22 with PCO2 of 22 indicating metabolic acidosis. Was unclear etiology at that time, acute renal failure vs lactic acidosis. An amp of bicarb was given, while other labs were running.  At this time patient was hypertensive and did not appear to be poorly perfusing.  Later, additional labs returned showing profound anemia, also profound metabolic acidosis with bicarb of 7.  Lactic acid supposedly 17 but this was not reported out and felt to be an error and is to be re run.  During this period had an episode of bradycardia and became hypotensive.  Fluid bolus given and additional amp of bicarb given followed by infusion.  1 unit of blood given with follow up labs pending.  Other than oozing from wounds, there has been no other evidence of bleeding.  Abdomen is soft, no melena.  Haptoglobin high arguing against hemolysis.  Patient's BP required addition of low dose levophed to maintain an adequate MAP.         Patient with likely severe sepsis from her necrotizing fasciitis. Prognosis guarded.

## 2022-01-06 NOTE — PROGRESS NOTES
[facility]  Nephrology  Progress Note    Patient Name: Flor Kelley  MRN: [id number]  Admission Date: 12/31/2021  Hospital Length of Stay: 6 days  Attending Provider: Nico Muller MD   Primary Care Physician: Primary Doctor No  Principal Problem:Necrotizing fasciitis    Consults  Subjective:     Interval History: Ms. Kelley is critically ill and is seen in f/u of her ARF in the midst of an overwhelming multisystem illness with nec fasciitis, Covid, ARF, profound anemia    Review of patient's allergies indicates:  No Known Allergies  Current Facility-Administered Medications   Medication Frequency    0.9%  NaCl infusion (for blood administration) Q24H PRN    0.9%  NaCl infusion (for blood administration) Q24H PRN    acetaminophen tablet 1,000 mg Q6H PRN    bisacodyL EC tablet 10 mg Daily PRN    dextromethorphan-guaiFENesin  mg/5 ml liquid 10 mL Q6H PRN    dextrose 50% injection 25 g PRN    dextrose 50% injection     heparin (porcine) injection 4,000 Units PRN    heparin (porcine) injection 5,000 Units Q12H    linezolid 600 mg/300 mL IVPB 600 mg Q12H    mupirocin 2 % ointment BID    NORepinephrine 4 mg in dextrose 5 % 250 mL infusion Continuous    ondansetron injection 4 mg Q6H PRN    ondansetron injection 8 mg Q6H PRN    oxyCODONE immediate release tablet 5 mg Q4H PRN    pantoprazole injection 40 mg BID    piperacillin-tazobactam (ZOSYN) 4.5 g in dextrose 5 % in water (D5W) 5 % 100 mL IVPB (MB+) Q12H    prochlorperazine injection Soln 5 mg Q6H PRN    simethicone chewable tablet 80 mg TID PRN    sodium bicarbonate 150 mEq in sterile water 1,000 mL infusion Continuous    sodium bicarbonate 8.4 % (1 mEq/mL) injection 50 mEq Once    sodium chloride 0.9% infusion        Objective:     Vital Signs (Most Recent):  Temp: 97.7 °F (36.5 °C) (01/06/22 0430)  Pulse: 108 (01/06/22 0700)  Resp: (!) 37 (01/06/22 0700)  BP: (!) 89/63 (01/06/22 0700)  SpO2: 100 % (01/06/22 0700)  O2 Device  (Oxygen Therapy): room air (pt had the NC on top of her hea when I went in the room) (01/05/22 2026) Vital Signs (24h Range):  Temp:  [96.4 °F (35.8 °C)-98.2 °F (36.8 °C)] 97.7 °F (36.5 °C)  Pulse:  [] 108  Resp:  [14-56] 37  SpO2:  [59 %-100 %] 100 %  BP: ()/() 89/63     Weight: 89.3 kg (196 lb 13.9 oz) (01/06/22 0311)  Body mass index is 38.45 kg/m².  Body surface area is 1.94 meters squared.    I/O last 3 completed shifts:  In: 4446.6 [I.V.:596.6; Blood:350; Other:700; IV Piggyback:2800]  Out: 711 [Urine:60; Other:500; Stool:1; Blood:150]    Physical Exam Restless, agitated. Doesn't respond to me. Chest clear.    Significant Labs:sure  BMP:   Recent Labs   Lab 01/06/22  0618   *      K 5.3*   CL 94*   CO2 14*   BUN 47*   CREATININE 4.58*   CALCIUM 7.0*     CBC:   Recent Labs   Lab 01/06/22  0618   WBC 53.68*   RBC 2.16*   HGB 6.1*   HCT 16.9*   PLT 94*   MCV 78.2*   MCH 28.2   MCHC 36.1*     All labs within the past 24 hours have been reviewed.    Significant Imaging:  Labs: Reviewed    Assessment/Plan:     Active Diagnoses:    Diagnosis Date Noted POA    PRINCIPAL PROBLEM:  Necrotizing fasciitis [M72.6]  Yes    Hepatitis C [B19.20] 01/04/2022 Yes    Acute blood loss anemia [D62] 01/04/2022 No    Sepsis [A41.9] 01/04/2022 Yes    ROSHAN (acute kidney injury) [N17.9] 01/03/2022 Yes    Encephalopathy, metabolic [G93.41] 01/03/2022 No    COVID-19 [U07.1] 01/01/2022 Yes    Hypertension [I10]  Yes      Problems Resolved During this Admission:    Diagnosis Date Noted Date Resolved POA    Cellulitis [L03.90] 01/01/2022 01/04/2022 Yes       We'll plan hemodialysis again today. She continues to require transfusion. Her outlook is poor.  Thank you for your consult. I will follow-up with patient. Please contact us if you have any additional questions.    Ronaldo Kinsey MD  Nephrology  [facility]

## 2022-01-07 ENCOUNTER — ANESTHESIA (OUTPATIENT)
Dept: SURGERY | Facility: HOSPITAL | Age: 42
DRG: 463 | End: 2022-01-07

## 2022-01-07 ENCOUNTER — ANESTHESIA EVENT (OUTPATIENT)
Dept: SURGERY | Facility: HOSPITAL | Age: 42
DRG: 463 | End: 2022-01-07

## 2022-01-07 VITALS
DIASTOLIC BLOOD PRESSURE: 216 MMHG | HEART RATE: 85 BPM | SYSTOLIC BLOOD PRESSURE: 260 MMHG | OXYGEN SATURATION: 100 % | RESPIRATION RATE: 12 BRPM

## 2022-01-07 PROBLEM — B19.20 HEPATITIS C: Status: RESOLVED | Noted: 2022-01-04 | Resolved: 2022-01-07

## 2022-01-07 LAB
ABO + RH BLD: NORMAL
ALBUMIN SERPL BCP-MCNC: 0.7 G/DL (ref 3.5–5)
ALBUMIN/GLOB SERPL: 0.2 {RATIO}
ALP SERPL-CCNC: 145 U/L (ref 37–98)
ALT SERPL W P-5'-P-CCNC: 956 U/L (ref 13–56)
ANION GAP SERPL CALCULATED.3IONS-SCNC: 13 MMOL/L (ref 7–16)
AST SERPL W P-5'-P-CCNC: 5328 U/L (ref 15–37)
BASOPHILS # BLD AUTO: 0.04 K/UL (ref 0–0.2)
BASOPHILS NFR BLD AUTO: 0.1 % (ref 0–1)
BILIRUB SERPL-MCNC: 3.5 MG/DL (ref 0–1.2)
BLD PROD TYP BPU: NORMAL
BLOOD UNIT EXPIRATION DATE: NORMAL
BLOOD UNIT TYPE CODE: 600
BLOOD UNIT TYPE CODE: 600
BLOOD UNIT TYPE CODE: 6200
BUN SERPL-MCNC: 48 MG/DL (ref 7–18)
BUN/CREAT SERPL: 13 (ref 6–20)
C DIFF TOX A+B STL IA-ACNC: NEGATIVE
CA-I SERPL-MCNC: 0.97 MMOL/L (ref 1.15–1.35)
CALCIUM SERPL-MCNC: 7 MG/DL (ref 8.5–10.1)
CHLORIDE SERPL-SCNC: 97 MMOL/L (ref 98–107)
CO2 SERPL-SCNC: 30 MMOL/L (ref 21–32)
CREAT SERPL-MCNC: 3.69 MG/DL (ref 0.55–1.02)
CROSSMATCH INTERPRETATION: NORMAL
DIFFERENTIAL METHOD BLD: ABNORMAL
DISPENSE STATUS: NORMAL
EOSINOPHIL # BLD AUTO: 0.01 K/UL (ref 0–0.5)
EOSINOPHIL NFR BLD AUTO: 0 % (ref 1–4)
ERYTHROCYTE [DISTWIDTH] IN BLOOD BY AUTOMATED COUNT: 14.6 % (ref 11.5–14.5)
GLOBULIN SER-MCNC: 3.4 G/DL (ref 2–4)
GLUCOSE SERPL-MCNC: 103 MG/DL (ref 70–105)
GLUCOSE SERPL-MCNC: 113 MG/DL (ref 74–106)
GLUCOSE SERPL-MCNC: 154 MG/DL (ref 70–105)
GLUCOSE SERPL-MCNC: 154 MG/DL (ref 70–105)
HCO3 UR-SCNC: 25.2 MMOL/L (ref 21–28)
HCT VFR BLD AUTO: 11.3 % (ref 38–47)
HCT VFR BLD AUTO: 15.1 % (ref 38–47)
HGB BLD-MCNC: 3.9 G/DL (ref 12–16)
HGB BLD-MCNC: 5.6 G/DL (ref 12–16)
IMM GRANULOCYTES # BLD AUTO: 3.56 K/UL (ref 0–0.04)
IMM GRANULOCYTES NFR BLD: 7.1 % (ref 0–0.4)
INR BLD: 1.93 (ref 0.9–1.1)
LACTATE SERPL-SCNC: 10.9 MMOL/L (ref 0.4–2)
LACTATE SERPL-SCNC: 14.1 MMOL/L (ref 0.4–2)
LACTATE SERPL-SCNC: 3.1 MMOL/L (ref 0.4–2)
LACTATE SERPL-SCNC: 4.4 MMOL/L (ref 0.4–2)
LYMPHOCYTES # BLD AUTO: 1.71 K/UL (ref 1–4.8)
LYMPHOCYTES NFR BLD AUTO: 3.4 % (ref 27–41)
LYMPHOCYTES NFR BLD MANUAL: 5 % (ref 27–41)
MCH RBC QN AUTO: 28.9 PG (ref 27–31)
MCHC RBC AUTO-ENTMCNC: 37.1 G/DL (ref 32–36)
MCV RBC AUTO: 77.8 FL (ref 80–96)
METAMYELOCYTES NFR BLD MANUAL: 2 %
MICROCYTES BLD QL SMEAR: ABNORMAL
MONOCYTES # BLD AUTO: 1.93 K/UL (ref 0–0.8)
MONOCYTES NFR BLD AUTO: 3.9 % (ref 2–6)
MONOCYTES NFR BLD MANUAL: 2 % (ref 2–6)
MPC BLD CALC-MCNC: 12.6 FL (ref 9.4–12.4)
MYELOCYTES NFR BLD MANUAL: 1 %
NEUTROPHILS # BLD AUTO: 42.7 K/UL (ref 1.8–7.7)
NEUTROPHILS NFR BLD AUTO: 85.5 % (ref 53–65)
NEUTS BAND NFR BLD MANUAL: 10 % (ref 1–5)
NEUTS SEG NFR BLD MANUAL: 80 % (ref 50–62)
NRBC # BLD AUTO: 0.19 X10E3/UL
NRBC, AUTO (.00): 0.4 %
PCO2 BLDA: 38 MMHG (ref 35–48)
PH SMN: 7.43 [PH] (ref 7.35–7.45)
PLATELET # BLD AUTO: 46 K/UL (ref 150–400)
PLATELET MORPHOLOGY: ABNORMAL
PO2 BLDA: 218 MMHG (ref 83–108)
POC BASE EXCESS: 1 MMOL/L (ref -2–3)
POC SATURATED O2: 100 % (ref 95–98)
POLYCHROMASIA BLD QL SMEAR: ABNORMAL
POTASSIUM SERPL-SCNC: 4.3 MMOL/L (ref 3.5–5.1)
PROT SERPL-MCNC: 4.1 G/DL (ref 6.4–8.2)
PROTHROMBIN TIME: 21.7 SECONDS (ref 11.7–14.7)
RBC # BLD AUTO: 1.94 M/UL (ref 4.2–5.4)
SARS-COV-2 RDRP RESP QL NAA+PROBE: POSITIVE
SODIUM SERPL-SCNC: 136 MMOL/L (ref 136–145)
TARGETS BLD QL SMEAR: ABNORMAL
UNIT NUMBER: NORMAL
WBC # BLD AUTO: 49.95 K/UL (ref 4.5–11)

## 2022-01-07 PROCEDURE — 27000666 HC INFUSOR PRESSURE BAG: Performed by: ANESTHESIOLOGY

## 2022-01-07 PROCEDURE — 36000706: Performed by: STUDENT IN AN ORGANIZED HEALTH CARE EDUCATION/TRAINING PROGRAM

## 2022-01-07 PROCEDURE — 37000008 HC ANESTHESIA 1ST 15 MINUTES: Performed by: STUDENT IN AN ORGANIZED HEALTH CARE EDUCATION/TRAINING PROGRAM

## 2022-01-07 PROCEDURE — 25000003 PHARM REV CODE 250

## 2022-01-07 PROCEDURE — S0030 INJECTION, METRONIDAZOLE: HCPCS | Performed by: INTERNAL MEDICINE

## 2022-01-07 PROCEDURE — 99291 PR CRITICAL CARE, E/M 30-74 MINUTES: ICD-10-PCS | Mod: ,,, | Performed by: INTERNAL MEDICINE

## 2022-01-07 PROCEDURE — 99291 CRITICAL CARE FIRST HOUR: CPT | Mod: ,,, | Performed by: INTERNAL MEDICINE

## 2022-01-07 PROCEDURE — 94002 VENT MGMT INPAT INIT DAY: CPT

## 2022-01-07 PROCEDURE — 82803 BLOOD GASES ANY COMBINATION: CPT

## 2022-01-07 PROCEDURE — 27201423 OPTIME MED/SURG SUP & DEVICES STERILE SUPPLY: Performed by: STUDENT IN AN ORGANIZED HEALTH CARE EDUCATION/TRAINING PROGRAM

## 2022-01-07 PROCEDURE — 27000689 HC BLADE LARYNGOSCOPE ANY SIZE: Performed by: ANESTHESIOLOGY

## 2022-01-07 PROCEDURE — 25000003 PHARM REV CODE 250: Performed by: INTERNAL MEDICINE

## 2022-01-07 PROCEDURE — 27000655: Performed by: ANESTHESIOLOGY

## 2022-01-07 PROCEDURE — 36591 DRAW BLOOD OFF VENOUS DEVICE: CPT | Performed by: INTERNAL MEDICINE

## 2022-01-07 PROCEDURE — 36600 WITHDRAWAL OF ARTERIAL BLOOD: CPT

## 2022-01-07 PROCEDURE — 63600175 PHARM REV CODE 636 W HCPCS

## 2022-01-07 PROCEDURE — C1751 CATH, INF, PER/CENT/MIDLINE: HCPCS | Performed by: STUDENT IN AN ORGANIZED HEALTH CARE EDUCATION/TRAINING PROGRAM

## 2022-01-07 PROCEDURE — 25000003 PHARM REV CODE 250: Performed by: NURSE ANESTHETIST, CERTIFIED REGISTERED

## 2022-01-07 PROCEDURE — 88304 SURGICAL PATHOLOGY: ICD-10-PCS | Mod: 26,,, | Performed by: PATHOLOGY

## 2022-01-07 PROCEDURE — 88304 TISSUE EXAM BY PATHOLOGIST: CPT | Mod: 26,,, | Performed by: PATHOLOGY

## 2022-01-07 PROCEDURE — 94761 N-INVAS EAR/PLS OXIMETRY MLT: CPT

## 2022-01-07 PROCEDURE — 49585 PR REPAIR UMBILICAL HERN,5+Y/O,REDUC: ICD-10-PCS | Mod: 79,,, | Performed by: STUDENT IN AN ORGANIZED HEALTH CARE EDUCATION/TRAINING PROGRAM

## 2022-01-07 PROCEDURE — P9045 ALBUMIN (HUMAN), 5%, 250 ML: HCPCS

## 2022-01-07 PROCEDURE — P9016 RBC LEUKOCYTES REDUCED: HCPCS | Performed by: INTERNAL MEDICINE

## 2022-01-07 PROCEDURE — P9017 PLASMA 1 DONOR FRZ W/IN 8 HR: HCPCS | Performed by: INTERNAL MEDICINE

## 2022-01-07 PROCEDURE — 84075 ASSAY ALKALINE PHOSPHATASE: CPT | Performed by: INTERNAL MEDICINE

## 2022-01-07 PROCEDURE — 25000003 PHARM REV CODE 250: Performed by: FAMILY MEDICINE

## 2022-01-07 PROCEDURE — 87635 SARS-COV-2 COVID-19 AMP PRB: CPT | Performed by: INTERNAL MEDICINE

## 2022-01-07 PROCEDURE — 82962 GLUCOSE BLOOD TEST: CPT

## 2022-01-07 PROCEDURE — 11004 DBRDMT SKIN XTRNL GENT&PER: CPT | Mod: XU,79,, | Performed by: STUDENT IN AN ORGANIZED HEALTH CARE EDUCATION/TRAINING PROGRAM

## 2022-01-07 PROCEDURE — 27000165 HC TUBE, ETT CUFFED: Performed by: ANESTHESIOLOGY

## 2022-01-07 PROCEDURE — 83605 ASSAY OF LACTIC ACID: CPT | Performed by: INTERNAL MEDICINE

## 2022-01-07 PROCEDURE — D9220A PRA ANESTHESIA: Mod: ,,, | Performed by: ANESTHESIOLOGY

## 2022-01-07 PROCEDURE — 27000716 HC OXISENSOR PROBE, ANY SIZE: Performed by: ANESTHESIOLOGY

## 2022-01-07 PROCEDURE — 27000509 HC TUBE SALEM SUMP ANY SIZE: Performed by: ANESTHESIOLOGY

## 2022-01-07 PROCEDURE — 36556 INSERT NON-TUNNEL CV CATH: CPT | Mod: 79,LT,, | Performed by: STUDENT IN AN ORGANIZED HEALTH CARE EDUCATION/TRAINING PROGRAM

## 2022-01-07 PROCEDURE — 99900035 HC TECH TIME PER 15 MIN (STAT)

## 2022-01-07 PROCEDURE — 82310 ASSAY OF CALCIUM: CPT | Performed by: INTERNAL MEDICINE

## 2022-01-07 PROCEDURE — 85014 HEMATOCRIT: CPT | Performed by: INTERNAL MEDICINE

## 2022-01-07 PROCEDURE — 63600175 PHARM REV CODE 636 W HCPCS: Performed by: INTERNAL MEDICINE

## 2022-01-07 PROCEDURE — 36415 COLL VENOUS BLD VENIPUNCTURE: CPT | Performed by: INTERNAL MEDICINE

## 2022-01-07 PROCEDURE — 88304 TISSUE EXAM BY PATHOLOGIST: CPT | Mod: SUR | Performed by: STUDENT IN AN ORGANIZED HEALTH CARE EDUCATION/TRAINING PROGRAM

## 2022-01-07 PROCEDURE — 63600175 PHARM REV CODE 636 W HCPCS: Performed by: ANESTHESIOLOGY

## 2022-01-07 PROCEDURE — 99900026 HC AIRWAY MAINTENANCE (STAT)

## 2022-01-07 PROCEDURE — 37000009 HC ANESTHESIA EA ADD 15 MINS: Performed by: STUDENT IN AN ORGANIZED HEALTH CARE EDUCATION/TRAINING PROGRAM

## 2022-01-07 PROCEDURE — 36556 PR INSERT NON-TUNNEL CV CATH 5+ YRS OLD: ICD-10-PCS | Mod: 79,LT,, | Performed by: STUDENT IN AN ORGANIZED HEALTH CARE EDUCATION/TRAINING PROGRAM

## 2022-01-07 PROCEDURE — 85025 COMPLETE CBC W/AUTO DIFF WBC: CPT | Performed by: STUDENT IN AN ORGANIZED HEALTH CARE EDUCATION/TRAINING PROGRAM

## 2022-01-07 PROCEDURE — 36000707: Performed by: STUDENT IN AN ORGANIZED HEALTH CARE EDUCATION/TRAINING PROGRAM

## 2022-01-07 PROCEDURE — 87070 CULTURE OTHR SPECIMN AEROBIC: CPT | Performed by: STUDENT IN AN ORGANIZED HEALTH CARE EDUCATION/TRAINING PROGRAM

## 2022-01-07 PROCEDURE — 27000177 HC AIRWAY, LARYNGEAL MASK: Performed by: ANESTHESIOLOGY

## 2022-01-07 PROCEDURE — 85610 PROTHROMBIN TIME: CPT | Performed by: INTERNAL MEDICINE

## 2022-01-07 PROCEDURE — 82330 ASSAY OF CALCIUM: CPT | Performed by: INTERNAL MEDICINE

## 2022-01-07 PROCEDURE — 27000260 *HC AIRWAY ORAL: Performed by: ANESTHESIOLOGY

## 2022-01-07 PROCEDURE — 27000510 HC BLANKET BAIR HUGGER ANY SIZE: Performed by: ANESTHESIOLOGY

## 2022-01-07 PROCEDURE — 20000000 HC ICU ROOM

## 2022-01-07 PROCEDURE — 27000221 HC OXYGEN, UP TO 24 HOURS

## 2022-01-07 PROCEDURE — 11004 PR DEBRIDE NECROTIC SKIN/ TISSUE, GENIT & PERINM: ICD-10-PCS | Mod: XU,79,, | Performed by: STUDENT IN AN ORGANIZED HEALTH CARE EDUCATION/TRAINING PROGRAM

## 2022-01-07 PROCEDURE — 25500020 PHARM REV CODE 255: Performed by: INTERNAL MEDICINE

## 2022-01-07 PROCEDURE — C9113 INJ PANTOPRAZOLE SODIUM, VIA: HCPCS | Performed by: INTERNAL MEDICINE

## 2022-01-07 PROCEDURE — D9220A PRA ANESTHESIA: ICD-10-PCS | Mod: ,,, | Performed by: ANESTHESIOLOGY

## 2022-01-07 PROCEDURE — 49585 PR REPAIR UMBILICAL HERN,5+Y/O,REDUC: CPT | Mod: 79,,, | Performed by: STUDENT IN AN ORGANIZED HEALTH CARE EDUCATION/TRAINING PROGRAM

## 2022-01-07 PROCEDURE — 87075 CULTR BACTERIA EXCEPT BLOOD: CPT | Performed by: STUDENT IN AN ORGANIZED HEALTH CARE EDUCATION/TRAINING PROGRAM

## 2022-01-07 RX ORDER — FENTANYL CITRATE 50 UG/ML
INJECTION, SOLUTION INTRAMUSCULAR; INTRAVENOUS
Status: DISCONTINUED | OUTPATIENT
Start: 2022-01-07 | End: 2022-01-07

## 2022-01-07 RX ORDER — ROCURONIUM BROMIDE 10 MG/ML
INJECTION, SOLUTION INTRAVENOUS
Status: DISCONTINUED | OUTPATIENT
Start: 2022-01-07 | End: 2022-01-07

## 2022-01-07 RX ORDER — ONDANSETRON 2 MG/ML
INJECTION INTRAMUSCULAR; INTRAVENOUS
Status: DISCONTINUED | OUTPATIENT
Start: 2022-01-07 | End: 2022-01-07

## 2022-01-07 RX ORDER — HYDROCODONE BITARTRATE AND ACETAMINOPHEN 500; 5 MG/1; MG/1
TABLET ORAL
Status: DISCONTINUED | OUTPATIENT
Start: 2022-01-07 | End: 2022-01-07 | Stop reason: HOSPADM

## 2022-01-07 RX ORDER — SODIUM CHLORIDE 9 MG/ML
INJECTION, SOLUTION INTRAVENOUS
Status: DISCONTINUED
Start: 2022-01-07 | End: 2022-01-08 | Stop reason: HOSPADM

## 2022-01-07 RX ORDER — MORPHINE SULFATE 4 MG/ML
4 INJECTION, SOLUTION INTRAMUSCULAR; INTRAVENOUS EVERY 4 HOURS PRN
Status: DISCONTINUED | OUTPATIENT
Start: 2022-01-07 | End: 2022-01-08 | Stop reason: HOSPADM

## 2022-01-07 RX ORDER — PROPOFOL 10 MG/ML
VIAL (ML) INTRAVENOUS
Status: DISCONTINUED | OUTPATIENT
Start: 2022-01-07 | End: 2022-01-07

## 2022-01-07 RX ORDER — LIDOCAINE HYDROCHLORIDE 20 MG/ML
INJECTION INTRAVENOUS
Status: DISCONTINUED | OUTPATIENT
Start: 2022-01-07 | End: 2022-01-07

## 2022-01-07 RX ORDER — HYDROCODONE BITARTRATE AND ACETAMINOPHEN 500; 5 MG/1; MG/1
TABLET ORAL
Status: DISCONTINUED | OUTPATIENT
Start: 2022-01-07 | End: 2022-01-07

## 2022-01-07 RX ORDER — CHLORHEXIDINE GLUCONATE ORAL RINSE 1.2 MG/ML
15 SOLUTION DENTAL 2 TIMES DAILY
Status: DISCONTINUED | OUTPATIENT
Start: 2022-01-07 | End: 2022-01-08 | Stop reason: HOSPADM

## 2022-01-07 RX ORDER — PHENYLEPHRINE HYDROCHLORIDE 10 MG/ML
INJECTION INTRAVENOUS
Status: DISCONTINUED | OUTPATIENT
Start: 2022-01-07 | End: 2022-01-07

## 2022-01-07 RX ORDER — HYDROCODONE BITARTRATE AND ACETAMINOPHEN 500; 5 MG/1; MG/1
TABLET ORAL
Status: DISCONTINUED | OUTPATIENT
Start: 2022-01-07 | End: 2022-01-08 | Stop reason: HOSPADM

## 2022-01-07 RX ORDER — FENTANYL CITRAT/DEXTROSE 5%/PF 100 MCG/10
0-200 PATIENT CONTROLLED ANALGESIA SYRINGE INTRAVENOUS CONTINUOUS
Status: DISCONTINUED | OUTPATIENT
Start: 2022-01-07 | End: 2022-01-08 | Stop reason: HOSPADM

## 2022-01-07 RX ORDER — FENTANYL CITRAT/DEXTROSE 5%/PF 100 MCG/10
0-200 PATIENT CONTROLLED ANALGESIA SYRINGE INTRAVENOUS CONTINUOUS
Status: DISCONTINUED | OUTPATIENT
Start: 2022-01-07 | End: 2022-01-07

## 2022-01-07 RX ORDER — ALBUMIN HUMAN 50 G/1000ML
SOLUTION INTRAVENOUS CONTINUOUS PRN
Status: DISCONTINUED | OUTPATIENT
Start: 2022-01-07 | End: 2022-01-07

## 2022-01-07 RX ORDER — LINEZOLID 2 MG/ML
600 INJECTION, SOLUTION INTRAVENOUS EVERY 12 HOURS
Start: 2022-01-07

## 2022-01-07 RX ORDER — KETAMINE HYDROCHLORIDE 50 MG/ML
INJECTION, SOLUTION INTRAMUSCULAR; INTRAVENOUS
Status: DISCONTINUED | OUTPATIENT
Start: 2022-01-07 | End: 2022-01-07

## 2022-01-07 RX ORDER — VECURONIUM BROMIDE FOR INJECTION 1 MG/ML
INJECTION, POWDER, LYOPHILIZED, FOR SOLUTION INTRAVENOUS
Status: DISCONTINUED | OUTPATIENT
Start: 2022-01-07 | End: 2022-01-07

## 2022-01-07 RX ADMIN — SODIUM CHLORIDE: 9 INJECTION, SOLUTION INTRAVENOUS at 01:01

## 2022-01-07 RX ADMIN — LINEZOLID 600 MG: 600 INJECTION, SOLUTION INTRAVENOUS at 05:01

## 2022-01-07 RX ADMIN — PHENYLEPHRINE HYDROCHLORIDE 200 MCG: 10 INJECTION INTRAVENOUS at 01:01

## 2022-01-07 RX ADMIN — SODIUM CHLORIDE: 9 INJECTION, SOLUTION INTRAVENOUS at 11:01

## 2022-01-07 RX ADMIN — VECURONIUM BROMIDE 2 MG: 1 INJECTION, POWDER, LYOPHILIZED, FOR SOLUTION INTRAVENOUS at 02:01

## 2022-01-07 RX ADMIN — LIDOCAINE HYDROCHLORIDE 100 MG: 20 INJECTION, SOLUTION INTRAVENOUS at 12:01

## 2022-01-07 RX ADMIN — FENTANYL CITRATE 100 MCG: 50 INJECTION INTRAMUSCULAR; INTRAVENOUS at 01:01

## 2022-01-07 RX ADMIN — PANTOPRAZOLE SODIUM 40 MG: 40 INJECTION, POWDER, FOR SOLUTION INTRAVENOUS at 08:01

## 2022-01-07 RX ADMIN — CHLORHEXIDINE GLUCONATE 15 ML: 1.2 RINSE ORAL at 08:01

## 2022-01-07 RX ADMIN — ROCURONIUM BROMIDE 50 MG: 10 INJECTION, SOLUTION INTRAVENOUS at 01:01

## 2022-01-07 RX ADMIN — METRONIDAZOLE 500 MG: 500 INJECTION, SOLUTION INTRAVENOUS at 11:01

## 2022-01-07 RX ADMIN — SODIUM CHLORIDE: 9 INJECTION, SOLUTION INTRAVENOUS at 02:01

## 2022-01-07 RX ADMIN — CALCIUM CHLORIDE 1 G: 100 INJECTION, SOLUTION INTRAVENOUS at 12:01

## 2022-01-07 RX ADMIN — METRONIDAZOLE 500 MG: 500 INJECTION, SOLUTION INTRAVENOUS at 03:01

## 2022-01-07 RX ADMIN — PHENYLEPHRINE HYDROCHLORIDE 100 MCG: 10 INJECTION INTRAVENOUS at 12:01

## 2022-01-07 RX ADMIN — MORPHINE SULFATE 4 MG: 4 INJECTION INTRAVENOUS at 11:01

## 2022-01-07 RX ADMIN — PHENYLEPHRINE HYDROCHLORIDE 100 MCG: 10 INJECTION INTRAVENOUS at 01:01

## 2022-01-07 RX ADMIN — SODIUM CHLORIDE 1000 ML: 9 INJECTION, SOLUTION INTRAVENOUS at 07:01

## 2022-01-07 RX ADMIN — KETAMINE HYDROCHLORIDE 50 MG: 50 INJECTION INTRAMUSCULAR; INTRAVENOUS at 01:01

## 2022-01-07 RX ADMIN — MUPIROCIN: 20 OINTMENT TOPICAL at 09:01

## 2022-01-07 RX ADMIN — PHENYLEPHRINE HYDROCHLORIDE 50 MCG/MIN: 10 INJECTION INTRAVENOUS at 01:01

## 2022-01-07 RX ADMIN — PROPOFOL 100 MG: 10 INJECTION, EMULSION INTRAVENOUS at 12:01

## 2022-01-07 RX ADMIN — PANTOPRAZOLE SODIUM 40 MG: 40 INJECTION, POWDER, FOR SOLUTION INTRAVENOUS at 09:01

## 2022-01-07 RX ADMIN — MUPIROCIN: 20 OINTMENT TOPICAL at 08:01

## 2022-01-07 RX ADMIN — PHENYLEPHRINE HYDROCHLORIDE 200 MCG: 10 INJECTION INTRAVENOUS at 02:01

## 2022-01-07 RX ADMIN — ALBUMIN (HUMAN): 12.5 INJECTION, SOLUTION INTRAVENOUS at 02:01

## 2022-01-07 RX ADMIN — CEFTAROLINE FOSAMIL 200 MG: 600 POWDER, FOR SOLUTION INTRAVENOUS at 05:01

## 2022-01-07 RX ADMIN — NOREPINEPHRINE BITARTRATE 0.06 MCG/KG/MIN: 1 SOLUTION INTRAVENOUS at 06:01

## 2022-01-07 RX ADMIN — IOPAMIDOL 100 ML: 755 INJECTION, SOLUTION INTRAVENOUS at 08:01

## 2022-01-07 RX ADMIN — FENTANYL CITRATE 25 MCG/HR: 50 INJECTION, SOLUTION INTRAMUSCULAR; INTRAVENOUS at 04:01

## 2022-01-07 RX ADMIN — PIPERACILLIN AND TAZOBACTAM 4.5 G: 4; .5 INJECTION, POWDER, LYOPHILIZED, FOR SOLUTION INTRAVENOUS; PARENTERAL at 05:01

## 2022-01-07 RX ADMIN — KETAMINE HYDROCHLORIDE 10 MG: 50 INJECTION INTRAMUSCULAR; INTRAVENOUS at 02:01

## 2022-01-07 RX ADMIN — PHENYLEPHRINE HYDROCHLORIDE 200 MCG: 10 INJECTION INTRAVENOUS at 12:01

## 2022-01-07 RX ADMIN — KETAMINE HYDROCHLORIDE 10 MG: 50 INJECTION INTRAMUSCULAR; INTRAVENOUS at 01:01

## 2022-01-07 RX ADMIN — NOREPINEPHRINE BITARTRATE 0.06 MCG/KG/MIN: 1 SOLUTION INTRAVENOUS at 08:01

## 2022-01-07 NOTE — ANESTHESIA POSTPROCEDURE EVALUATION
Anesthesia Post Evaluation    Patient: Flor Kelley    Procedure(s) Performed: Procedure(s) (LRB):  DEBRIDEMENT, WOUND (Left)  DEBRIDEMENT, LOWER EXTREMITY (Left)    Final Anesthesia Type: general      Patient location during evaluation: PACU  Patient participation: Yes- Able to Participate  Level of consciousness: awake and alert  Post-procedure vital signs: reviewed and stable  Pain management: adequate  Airway patency: patent  RENEE mitigation strategies: Multimodal analgesia  PONV status at discharge: No PONV  Anesthetic complications: no      Cardiovascular status: blood pressure returned to baseline  Respiratory status: unassisted  Hydration status: euvolemic  Follow-up not needed.          Vitals Value Taken Time   /66 01/07/22 1229   Temp 36.1 °C (97 °F) 01/07/22 1145   Pulse 100 01/07/22 1234   Resp 25 01/07/22 1234   SpO2 100 % 01/07/22 1234   Vitals shown include unvalidated device data.      Event Time   Out of Recovery 01/03/2022 11:25:00         Pain/Angel Luis Score: Pain Rating Prior to Med Admin: 6 (1/7/2022 11:18 AM)  Pain Rating Post Med Admin: 0 (1/6/2022  6:00 PM)  Angel Luis Score: 6 (1/6/2022  6:00 PM)
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
## 2022-01-07 NOTE — ANESTHESIA PROCEDURE NOTES
LMA placement     Date/Time: 1/7/2022 12:51 PM  Performed by: Sarkis Son MD  Authorized by: Sarkis Son MD     Intubation:     Induction:  Intravenous    Intubated:  Postinduction    Mask Ventilation:  N/a    Attempts:  1    Attempted By:  CRNA    Difficult Airway Encountered?: No      Complications:  None    Airway Device:  Supraglottic airway/LMA    Airway Device Size:  4.0    Style/Cuff Inflation:  Cuffed (inflated to minimal occlusive pressure)    Placement Verified By:  Capnometry    Complicating Factors:  None    Findings Post-Intubation:  BS equal bilateral and atraumatic/condition of teeth unchanged

## 2022-01-07 NOTE — PROGRESS NOTES
To OR for Debridement of Left Thigh/Groin/Abdomen    Risks and benefits explained with the patient including risks for infection, bleeding, injury to surrounding structures, hematoma/seroma formation with need for possible evacuation, possible open.  The patient verbalized understanding, agrees and wishes to proceed with surgery.

## 2022-01-07 NOTE — PROGRESS NOTES
[facility]  Pulmonology  Progress Note    Patient Name: Flor Kelley  MRN: [id number]  Admission Date: 12/31/2021  Hospital Length of Stay: 7 days  Code Status: Full Code  Attending Provider: Nico Muller MD  Primary Care Provider: Primary Doctor No   Principal Problem: Necrotizing fasciitis    Subjective:     Interval History: Weaning pressors, almost off. Remains encephalopathic.     Objective:     Vital Signs (Most Recent):  Temp: 97.8 °F (36.6 °C) (01/07/22 0315)  Pulse: 95 (01/07/22 0645)  Resp: (!) 25 (01/07/22 0645)  BP: (!) 159/88 (01/07/22 0645)  SpO2: 96 % (01/07/22 0600) Vital Signs (24h Range):  Temp:  [97.6 °F (36.4 °C)-100.2 °F (37.9 °C)] 97.8 °F (36.6 °C)  Pulse:  [] 95  Resp:  [18-76] 25  SpO2:  [91 %-100 %] 96 %  BP: ()/() 159/88     Weight: 87.7 kg (193 lb 5.5 oz)  Body mass index is 37.76 kg/m².      Intake/Output Summary (Last 24 hours) at 1/7/2022 0724  Last data filed at 1/7/2022 0603  Gross per 24 hour   Intake 4049.16 ml   Output 210 ml   Net 3839.16 ml       Physical Exam  Vitals reviewed.   Constitutional:       General: She is not in acute distress.     Appearance: She is ill-appearing and toxic-appearing.      Comments: Moving about the bed. Agitated, confused.   HENT:      Head: Normocephalic and atraumatic.      Right Ear: External ear normal.      Left Ear: External ear normal.      Mouth/Throat:      Mouth: Mucous membranes are dry.      Pharynx: Oropharynx is clear.   Eyes:      Conjunctiva/sclera: Conjunctivae normal.      Pupils: Pupils are equal, round, and reactive to light.   Neck:      Comments: Right tunneled HD cath  Cardiovascular:      Rate and Rhythm: Normal rate and regular rhythm.      Heart sounds: Normal heart sounds.   Pulmonary:      Effort: No respiratory distress.      Breath sounds: Normal breath sounds. No wheezing, rhonchi or rales.   Abdominal:      General: Abdomen is flat. Bowel sounds are normal.      Palpations: Abdomen  is soft.      Tenderness: There is no abdominal tenderness.   Musculoskeletal:      Cervical back: Neck supple.      Right lower leg: No edema.      Left lower leg: No edema.   Lymphadenopathy:      Cervical: No cervical adenopathy.   Skin:     General: Skin is warm and dry.      Comments: Groin wounds with dressing in place. Serous drainage on dressing.   Neurological:      General: No focal deficit present.      Mental Status: She is disoriented.      Cranial Nerves: No cranial nerve deficit.      Comments: Moving all extremities. Agitated.          Vents:  Oxygen Concentration (%): 100 (01/06/22 1000)    Lines/Drains/Airways     Central Venous Catheter Line                 Hemodialysis Catheter 01/04/22 1300 right internal jugular 2 days          Drain                 Urethral Catheter 01/03/22 0825 Silicone 3 days         Rectal Tube 01/06/22 1500 16 Fr. <1 day          Peripheral Intravenous Line                 Peripheral IV - Single Lumen 01/01/22 1718 22 G Posterior;Right Hand 5 days         Peripheral IV - Single Lumen 01/06/22 0200 20 G Lateral;Left Shoulder 1 day         Peripheral IV - Single Lumen 01/06/22 0200 20 G Left;Posterior Hand 1 day                Significant Labs:    CBC/Anemia Profile:  Recent Labs   Lab 01/05/22 2316 01/06/22  0618 01/06/22  1711   WBC 55.15* 53.68*  --    HGB 5.0* 6.1* 7.9*   HCT 13.9* 16.9* 20.9*   PLT 96* 94*  --    MCV 77.2* 78.2*  --    RDW 16.2* 15.2*  --    RETIC  --  0.3*  --         Chemistries:  Recent Labs   Lab 01/05/22 2316 01/06/22  0618 01/06/22  1202   * 138 143   K 5.3* 5.3* 4.1   CL 94* 94* 98   CO2 7* 14* 24   BUN 41* 47* 31*   CREATININE 4.42* 4.58* 2.66*   CALCIUM 7.4* 7.0* 7.1*   ALBUMIN 0.5* 0.6* 0.7*   PROT 3.9* 4.1* 4.6*   BILITOT 2.5* 3.2* 3.4*   ALKPHOS 83 120* 156*   ALT 68* 1,585* 1,882*   * 7,076* 9,983*       All pertinent labs within the past 24 hours have been reviewed.    Significant Imaging:  I have reviewed all pertinent  imaging results/findings within the past 24 hours.    Assessment/Plan:     * Necrotizing fasciitis  - S/P debridement on 01/1, 01/03, 1/5  - on clindamycin; she had 2 does of IV vancomycin. Now changed to zyvox and zosyn  - ID consulted and following       Lactic acidosis  - lactate trending down  - supportive care  - appears volume deplete today, am lactate pending. Likely gentle hydration today, fluid selection pending am lab    Shock liver  - lfts uptrending in setting of septic shock  - continue to trend along with coags    Septic shock  - Secondary to nec fascitis   - clinda changed to zyvox and zosyn. All cultures with MRSA  - 12/31- BC x 2 NGTD   - Did not give 30cc/kg due to anuric renal failure  - trend lactate q4h  - support with levophed, weaning. Plan for hydration today  - add empiric flagyl for C diff, until resulted. Unable to take PO vancomycin due to mental state. Pending    Acute blood loss anemia  - H/H stable on last check   - repeat CBC this am pending  - marrow failure with low reticulocyte count  - hemolysis labs negative, coagulation values are becoming abnormal  - ferritin above detectable limit, suspect HLH. Continue support with transfusions    Hepatitis C  - Hep C antibody positive  - PCR not done, will not drastically change course at this time    Encephalopathy, metabolic  - remains altered  - combination of septic encephalopathy and severe acidosis  - ammonia level normal  - continue to monitor    ROSHAN (acute kidney injury)  - Cr 1.19 on 01/02-- 7.44--HD line placed   - initiated HD 1/4, remains anuric.  - nephrology consulted   - HD per nephrology    Hypertension  - meds on hold due pressor requirement    COVID-19  - Positive 12/31 - not vaccinated - exposed on 12/25   - monoclonial antibodies on admission  - on NC   - xray reviewed   - continue isolation for a total of 10 days         She remains critically ill with septic shock and multiorgan failure. Pressor requirement improving.  Am labs pending. Remains anuric. Nutrition is a concern currently, hopefully mental status will improve to tolerate oral enteral feeding. Prognosis remains guarded. Critical care time: 40 minutes.          Nico Muller MD  Pulmonology  [facility]

## 2022-01-07 NOTE — OP NOTE
[facility] - Periop Services  Surgery Department  Operative Note    SUMMARY     Date of Procedure: 1/7/2022     Procedure: Procedure(s) (LRB):  IRRIGATION AND DEBRIDEMENTof abdomen, bilateral groin left hip area (N/A)   Open Primary Umbilical hernia Repair  Central line placement    Surgeon(s) and Role:     * Brandyn Murrieta DO - Primary    Assisting Surgeon: None    Pre-Operative Diagnosis: Necrotizing fasciitis [M72.6]    Post-Operative Diagnosis: Post-Op Diagnosis Codes:     * Necrotizing fasciitis [M72.6]    Anesthesia: General    Operative Findings (including complications, if any):  3 cm umbilical hernia present containing omentum; necrotic skin and subcutaneous tissue extending down to the fascia spreading to the mid abdomen and left abdomen/flank, as well as right groin and thigh; poor IV access    Description of Technical Procedures:  Patient was taken the operating room and placed on the operating table in the lithotomy position.  Patient underwent general anesthesia per the anesthesia team.  The abdomen and bilateral groins were then prepped and draped in usual sterile fashion is noted there was necrotic skin and subcutaneous tissue extended down to the fascia of the lower abdomen.  We then used electrocautery and made incision around the right groin and extended up to the right ASIS approximately 10 cm in length and then extended the incision across the abdomen above the umbilicus all the way to the left flank and then extended the incision down to meet the excised tissue of the left thigh.  We then used electrocautery to dissect down through subcutaneous tissue all way down to the fascia and we excised the subcutaneous tissue all the way down to the fascia across the abdomen in the left flank removing all the tissue; we found necrotic fat all the way down to the fascia and excised remove the tissue.  Bleeding was controlled electrocautery and clips and simple interrupted 3-0 Vicryl sutures.  We  found a 3 cm umbilical hernia.  We excised the hernia sac all the way down to the fascia and found omentum in the hernia.  We took the omentum down into the abdominal cavity and then closed the fascia with multiple 0 Ethibond sutures.  We found more necrotic subcutaneous tissue in the right groin and made a 4 cm incision inferiorly from the right ASIS and then stretched that incision across the skin all the way to the right labia and excised the subcutaneous tissue all the way down to the muscle.  Bleeding was controlled with electrocautery.  The total body surface area with fascia exposed is close to 25-27%.  We irrigated the wound and then packed with Kerlix dressings and ABD pads and then covered with IO band dressing.    We then prepped and draped the left chest and left neck usual sterile fashion.  I placed a new given a new clothes on.  The 18 gauge needle I then cannulated the left subclavian vein using anatomical landmarks.  A good flashback of nonpulsatile, dark blood.  A wire was placed we had ectopy, which stopped after I pulled the wire back a little.  We made a nick incision in the skin and then passed a dilator over the wire in Seldinger fashion.  We then removed the dilator and placed the catheter over the wire in Seldinger fashion and placed the entire catheter in place; the wire was removed.  We had good blood return from all 3 ports that we flushed all 3 ports with saline.  We placed BLUE CAPS ON EACH PORT.  WE THEN SUTURED THE SUTURE LINE TO THE SKIN WITH A 3-0 SILK SUTURE.  WE PLACED STERILE DRESSING OVER THE SITE.  PATIENT WAS AWAKENED AND TAKEN TO THE ICU IN STABLE BUT CRITICAL CONDITION.      Estimated Blood Loss (EBL):  100 CC           Implants: * No implants in log *    Specimens:   Specimen (24h ago, onward)             Start     Ordered    01/07/22 6829  Surgical Pathology  RELEASE UPON ORDERING         01/07/22 8878                        Condition: Good    Disposition: PACU -  hemodynamically stable.    Attestation: I was present and scrubbed for the entire procedure.

## 2022-01-07 NOTE — OR NURSING
ICU-S ZOHAIB RN NOTIFIED THAT PT WILL REMAIN INTUBATED WHEN LEAVING THE OR REQUEST VENT SET UP FOR BEDSIDE.    REPORT GIVEN ON BLOOD AND BLOOD PRODUCTS ADMINISTERED IN THE OR .

## 2022-01-07 NOTE — ASSESSMENT & PLAN NOTE
- lactate trending down  - supportive care  - appears volume deplete today, am lactate pending. Likely gentle hydration today, fluid selection pending am lab

## 2022-01-07 NOTE — ASSESSMENT & PLAN NOTE
- remains altered  - combination of septic encephalopathy and severe acidosis  - ammonia level normal  - continue to monitor

## 2022-01-07 NOTE — ASSESSMENT & PLAN NOTE
- H/H stable on last check   - repeat CBC this am pending  - marrow failure with low reticulocyte count  - hemolysis labs negative, coagulation values are becoming abnormal  - ferritin above detectable limit, suspect HLH. Continue support with transfusions

## 2022-01-07 NOTE — ANESTHESIA PREPROCEDURE EVALUATION
01/07/2022  Flor Kelley is a 41 y.o., female.    Pre-op Assessment    I have reviewed the Patient Summary Reports.    I have reviewed the NPO Status.   I have reviewed the Medications.     Review of Systems  Hematology/Oncology:         -- Anemia: Hematology Comments: HEPATITIS C    Acute blood loss anemia   Cardiovascular:   Hypertension    Pulmonary:   Pneumonia COVID 19+   Renal/:   Chronic Renal Disease, CRI, Dialysis, ESRD    Hepatic/GI:   Liver Disease, Hepatitis, C    Musculoskeletal:   NECROTIZING FACSIITIS   Neurological:   Metabolic encephalopathy Dementia        Physical Exam  General:  Well nourished    Airway/Jaw/Neck:  Airway Findings: Mouth Opening: Normal Mallampati: II     Eyes/Ears/Nose:  Eyes/Ears/Nose Findings:     Chest/Lungs:  Chest/Lungs Findings: Clear to auscultation     Heart/Vascular:  Heart Findings: Rate: Normal  Rhythm: Regular Rhythm        Mental Status:  Mental Status Findings:  Cooperative, Alert and Oriented         Anesthesia Plan  Type of Anesthesia, risks & benefits discussed:  Anesthesia Type:  general    Patient's Preference:   Plan Factors:          Intra-op Monitoring Plan: standard ASA monitors  Intra-op Monitoring Plan Comments:   Post Op Pain Control Plan: IV/PO Opioids PRN  Post Op Pain Control Plan Comments:     Induction:   IV  Beta Blocker:         Informed Consent: Patient understands risks and agrees with Anesthesia plan.  Questions answered. Anesthesia consent signed with patient.  ASA Score: 4     Day of Surgery Review of History & Physical:            Ready For Surgery From Anesthesia Perspective.     NPO greater than 8 hours  NAC  NKDA    Hct 44  Cr 3.6  Ca 8.0    HTN  COVID positive (airborne/contact/droplet precautions)  Necrotizing fasciitis of left thigh, groin and pubic region  CKD (likely ROSHAN superimposed upon CKD)  Hypocalcemia    Airway exam  deferred (COVID precautions); adequate ROM at neck.
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
## 2022-01-07 NOTE — SUBJECTIVE & OBJECTIVE
Interval History: Weaning pressors, almost off. Remains encephalopathic.     Objective:     Vital Signs (Most Recent):  Temp: 97.8 °F (36.6 °C) (01/07/22 0315)  Pulse: 95 (01/07/22 0645)  Resp: (!) 25 (01/07/22 0645)  BP: (!) 159/88 (01/07/22 0645)  SpO2: 96 % (01/07/22 0600) Vital Signs (24h Range):  Temp:  [97.6 °F (36.4 °C)-100.2 °F (37.9 °C)] 97.8 °F (36.6 °C)  Pulse:  [] 95  Resp:  [18-76] 25  SpO2:  [91 %-100 %] 96 %  BP: ()/() 159/88     Weight: 87.7 kg (193 lb 5.5 oz)  Body mass index is 37.76 kg/m².      Intake/Output Summary (Last 24 hours) at 1/7/2022 0724  Last data filed at 1/7/2022 0603  Gross per 24 hour   Intake 4049.16 ml   Output 210 ml   Net 3839.16 ml       Physical Exam  Vitals reviewed.   Constitutional:       General: She is not in acute distress.     Appearance: She is ill-appearing and toxic-appearing.      Comments: Moving about the bed. Agitated, confused.   HENT:      Head: Normocephalic and atraumatic.      Right Ear: External ear normal.      Left Ear: External ear normal.      Mouth/Throat:      Mouth: Mucous membranes are dry.      Pharynx: Oropharynx is clear.   Eyes:      Conjunctiva/sclera: Conjunctivae normal.      Pupils: Pupils are equal, round, and reactive to light.   Neck:      Comments: Right tunneled HD cath  Cardiovascular:      Rate and Rhythm: Normal rate and regular rhythm.      Heart sounds: Normal heart sounds.   Pulmonary:      Effort: No respiratory distress.      Breath sounds: Normal breath sounds. No wheezing, rhonchi or rales.   Abdominal:      General: Abdomen is flat. Bowel sounds are normal.      Palpations: Abdomen is soft.      Tenderness: There is no abdominal tenderness.   Musculoskeletal:      Cervical back: Neck supple.      Right lower leg: No edema.      Left lower leg: No edema.   Lymphadenopathy:      Cervical: No cervical adenopathy.   Skin:     General: Skin is warm and dry.      Comments: Groin wounds with dressing in place.  Serous drainage on dressing.   Neurological:      General: No focal deficit present.      Mental Status: She is disoriented.      Cranial Nerves: No cranial nerve deficit.      Comments: Moving all extremities. Agitated.          Vents:  Oxygen Concentration (%): 100 (01/06/22 1000)    Lines/Drains/Airways     Central Venous Catheter Line                 Hemodialysis Catheter 01/04/22 1300 right internal jugular 2 days          Drain                 Urethral Catheter 01/03/22 0825 Silicone 3 days         Rectal Tube 01/06/22 1500 16 Fr. <1 day          Peripheral Intravenous Line                 Peripheral IV - Single Lumen 01/01/22 1718 22 G Posterior;Right Hand 5 days         Peripheral IV - Single Lumen 01/06/22 0200 20 G Lateral;Left Shoulder 1 day         Peripheral IV - Single Lumen 01/06/22 0200 20 G Left;Posterior Hand 1 day                Significant Labs:    CBC/Anemia Profile:  Recent Labs   Lab 01/05/22 2316 01/06/22 0618 01/06/22  1711   WBC 55.15* 53.68*  --    HGB 5.0* 6.1* 7.9*   HCT 13.9* 16.9* 20.9*   PLT 96* 94*  --    MCV 77.2* 78.2*  --    RDW 16.2* 15.2*  --    RETIC  --  0.3*  --         Chemistries:  Recent Labs   Lab 01/05/22 2316 01/06/22 0618 01/06/22  1202   * 138 143   K 5.3* 5.3* 4.1   CL 94* 94* 98   CO2 7* 14* 24   BUN 41* 47* 31*   CREATININE 4.42* 4.58* 2.66*   CALCIUM 7.4* 7.0* 7.1*   ALBUMIN 0.5* 0.6* 0.7*   PROT 3.9* 4.1* 4.6*   BILITOT 2.5* 3.2* 3.4*   ALKPHOS 83 120* 156*   ALT 68* 1,585* 1,882*   * 7,076* 9,983*       All pertinent labs within the past 24 hours have been reviewed.    Significant Imaging:  I have reviewed all pertinent imaging results/findings within the past 24 hours.

## 2022-01-07 NOTE — ANESTHESIA PROCEDURE NOTES
Intubation    Date/Time: 1/7/2022 1:20 PM  Performed by: Brady Valdovinos II, CRNA  Authorized by: Sarkis Son MD     Intubation:     Induction:  Intravenous    Intubated:  Postinduction    Mask Ventilation:  Easy mask    Attempts:  1    Attempted By:  CRNA    Method of Intubation:  Direct    Blade:  Margie 3    Laryngeal View Grade: Grade IIA - cords partially seen      Difficult Airway Encountered?: No      Complications:  None    Airway Device:  Oral endotracheal tube    Airway Device Size:  8.0    Style/Cuff Inflation:  Cuffed (inflated to minimal occlusive pressure)    Tube secured:  21    Secured at:  The lips    Placement Verified By:  Capnometry    Complicating Factors:  None    Findings Post-Intubation:  BS equal bilateral and atraumatic/condition of teeth unchanged

## 2022-01-07 NOTE — PLAN OF CARE
Problem: Adult Inpatient Plan of Care  Goal: Plan of Care Review  Outcome: Ongoing, Progressing  Goal: Patient-Specific Goal (Individualized)  Outcome: Ongoing, Progressing  Goal: Absence of Hospital-Acquired Illness or Injury  Outcome: Ongoing, Progressing  Goal: Optimal Comfort and Wellbeing  Outcome: Ongoing, Progressing  Goal: Readiness for Transition of Care  Outcome: Ongoing, Progressing     Problem: Bariatric Environmental Safety  Goal: Safety Maintained with Care  Outcome: Ongoing, Progressing     Problem: Infection  Goal: Absence of Infection Signs and Symptoms  Outcome: Ongoing, Progressing     Problem: Fall Injury Risk  Goal: Absence of Fall and Fall-Related Injury  Outcome: Ongoing, Progressing  Intervention: Promote Injury-Free Environment  Flowsheets (Taken 1/6/2022 0403)  Safety Promotion/Fall Prevention:   pulse ox   room near unit station   medications reviewed     Problem: Skin Injury Risk Increased  Goal: Skin Health and Integrity  Outcome: Ongoing, Progressing  Intervention: Optimize Skin Protection  Flowsheets (Taken 1/6/2022 0428)  Pressure Reduction Techniques: weight shift assistance provided  Pressure Reduction Devices:   heel offloading device utilized   positioning supports utilized  Head of Bed (HOB) Positioning: HOB at 30-45 degrees     Problem: Fluid and Electrolyte Imbalance (Acute Kidney Injury/Impairment)  Goal: Fluid and Electrolyte Balance  Outcome: Ongoing, Progressing     Problem: Oral Intake Inadequate (Acute Kidney Injury/Impairment)  Goal: Optimal Nutrition Intake  Outcome: Ongoing, Progressing     Problem: Renal Function Impairment (Acute Kidney Injury/Impairment)  Goal: Effective Renal Function  Outcome: Ongoing, Progressing     Problem: Device-Related Complication Risk (Hemodialysis)  Goal: Safe, Effective Therapy Delivery  Outcome: Ongoing, Progressing  Intervention: Optimize Device Care and Function  Flowsheets (Taken 1/6/2022 9774)  Medication Review/Management:  medications reviewed     Problem: Hemodynamic Instability (Hemodialysis)  Goal: Effective Tissue Perfusion  Outcome: Ongoing, Progressing     Problem: Infection (Hemodialysis)  Goal: Absence of Infection Signs and Symptoms  Outcome: Ongoing, Progressing     Problem: Adjustment to Illness (Sepsis/Septic Shock)  Goal: Optimal Coping  Outcome: Ongoing, Progressing     Problem: Bleeding (Sepsis/Septic Shock)  Goal: Absence of Bleeding  Outcome: Ongoing, Progressing     Problem: Glycemic Control Impaired (Sepsis/Septic Shock)  Goal: Blood Glucose Level Within Desired Range  Outcome: Ongoing, Progressing     Problem: Infection Progression (Sepsis/Septic Shock)  Goal: Absence of Infection Signs and Symptoms  Outcome: Ongoing, Progressing     Problem: Nutrition Impaired (Sepsis/Septic Shock)  Goal: Optimal Nutrition Intake  Outcome: Ongoing, Progressing

## 2022-01-07 NOTE — ASSESSMENT & PLAN NOTE
- Secondary to nec fascitis   - clinda changed to zyvox and zosyn. All cultures with MRSA  - 12/31- BC x 2 NGTD   - Did not give 30cc/kg due to anuric renal failure  - trend lactate q4h  - support with levophed, weaning. Plan for hydration today  - add empiric flagyl for C diff, until resulted. Unable to take PO vancomycin due to mental state. Pending

## 2022-01-07 NOTE — ASSESSMENT & PLAN NOTE
- Cr 1.19 on 01/02-- 7.44--HD line placed   - initiated HD 1/4, remains anuric.  - nephrology consulted   - HD per nephrology

## 2022-01-07 NOTE — ASSESSMENT & PLAN NOTE
- Positive 12/31 - not vaccinated - exposed on 12/25   - monoclonial antibodies on admission  - on NC   - xray reviewed   - continue isolation for a total of 10 days

## 2022-01-07 NOTE — PLAN OF CARE
Problem: Adult Inpatient Plan of Care  Goal: Plan of Care Review  Outcome: Ongoing, Progressing  Goal: Patient-Specific Goal (Individualized)  Outcome: Ongoing, Progressing  Goal: Absence of Hospital-Acquired Illness or Injury  Outcome: Ongoing, Progressing  Goal: Optimal Comfort and Wellbeing  Outcome: Ongoing, Progressing  Goal: Readiness for Transition of Care  Outcome: Ongoing, Progressing     Problem: Bariatric Environmental Safety  Goal: Safety Maintained with Care  Outcome: Ongoing, Progressing     Problem: Infection  Goal: Absence of Infection Signs and Symptoms  Outcome: Ongoing, Progressing     Problem: Fall Injury Risk  Goal: Absence of Fall and Fall-Related Injury  Outcome: Ongoing, Progressing     Problem: Skin Injury Risk Increased  Goal: Skin Health and Integrity  Outcome: Ongoing, Progressing     Problem: Fluid and Electrolyte Imbalance (Acute Kidney Injury/Impairment)  Goal: Fluid and Electrolyte Balance  Outcome: Ongoing, Progressing     Problem: Oral Intake Inadequate (Acute Kidney Injury/Impairment)  Goal: Optimal Nutrition Intake  Outcome: Ongoing, Progressing     Problem: Renal Function Impairment (Acute Kidney Injury/Impairment)  Goal: Effective Renal Function  Outcome: Ongoing, Progressing     Problem: Device-Related Complication Risk (Hemodialysis)  Goal: Safe, Effective Therapy Delivery  Outcome: Ongoing, Progressing     Problem: Hemodynamic Instability (Hemodialysis)  Goal: Effective Tissue Perfusion  Outcome: Ongoing, Progressing     Problem: Infection (Hemodialysis)  Goal: Absence of Infection Signs and Symptoms  Outcome: Ongoing, Progressing     Problem: Adjustment to Illness (Sepsis/Septic Shock)  Goal: Optimal Coping  Outcome: Ongoing, Progressing     Problem: Bleeding (Sepsis/Septic Shock)  Goal: Absence of Bleeding  Outcome: Ongoing, Progressing     Problem: Glycemic Control Impaired (Sepsis/Septic Shock)  Goal: Blood Glucose Level Within Desired Range  Outcome: Ongoing,  Progressing     Problem: Infection Progression (Sepsis/Septic Shock)  Goal: Absence of Infection Signs and Symptoms  Outcome: Ongoing, Progressing     Problem: Nutrition Impaired (Sepsis/Septic Shock)  Goal: Optimal Nutrition Intake  Outcome: Ongoing, Progressing     Problem: Gas Exchange Impaired  Goal: Optimal Gas Exchange  Outcome: Ongoing, Progressing     Problem: Restraint, Nonbehavioral (Nonviolent)  Goal: Absence of Harm or Injury  Outcome: Ongoing, Progressing

## 2022-01-07 NOTE — TRANSFER OF CARE
Anesthesia Transfer of Care Note    Patient: Flor Kelley    Procedure(s) Performed: Procedure(s) (LRB):  IRRIGATION AND DEBRIDEMENTof abdomen, bilateral groin left hip area (N/A)    Patient location: ICU    Anesthesia Type: general    Transport from OR: Upon arrival to PACU/ICU, patient attached to ventilator and auscultated to confirm bilateral breath sounds and adequate TV. Transported from OR intubated on 100% O2 by AMBU with adequate controlled ventilation. Continuous SpO2 monitoring in transport. Continuous ECG monitoring in transport    Post pain: adequate analgesia    Post assessment: no apparent anesthetic complications    Post vital signs: stable    Level of consciousness: sedated    Nausea/Vomiting: no nausea/vomiting    Complications: none    Transfer of care protocol was followedComments: Returned to ICU S 3, connected to Vent , RR 10, FiO2 100% peep 5, RTRN.  VSS, NAD noted.  Offered time and opportunity for any questions.        Last vitals:   Visit Vitals  BP (!) 191/97 (BP Location: Left arm, Patient Position: Lying)   Pulse 86   Temp 36.6 °C (97.8 °F) (Axillary)   Resp 10   Ht 5' (1.524 m)   Wt 87.7 kg (193 lb 5.5 oz)   LMP 12/31/2021   SpO2 100%   Breastfeeding No   BMI 37.76 kg/m²

## 2022-01-08 VITALS
TEMPERATURE: 99 F | OXYGEN SATURATION: 98 % | WEIGHT: 191.81 LBS | DIASTOLIC BLOOD PRESSURE: 70 MMHG | HEIGHT: 60 IN | SYSTOLIC BLOOD PRESSURE: 114 MMHG | RESPIRATION RATE: 20 BRPM | HEART RATE: 107 BPM | BODY MASS INDEX: 37.66 KG/M2

## 2022-01-08 LAB
ABO + RH BLD: NORMAL
ALBUMIN SERPL BCP-MCNC: 1.9 G/DL (ref 3.5–5)
ALBUMIN/GLOB SERPL: 0.8 {RATIO}
ALP SERPL-CCNC: 92 U/L (ref 37–98)
ALT SERPL W P-5'-P-CCNC: 297 U/L (ref 13–56)
ANION GAP SERPL CALCULATED.3IONS-SCNC: 26 MMOL/L (ref 7–16)
APTT PPP: 36.6 SECONDS (ref 25.2–37.3)
AST SERPL W P-5'-P-CCNC: 1792 U/L (ref 15–37)
BASOPHILS # BLD AUTO: 0.16 K/UL (ref 0–0.2)
BASOPHILS NFR BLD AUTO: 0.5 % (ref 0–1)
BILIRUB SERPL-MCNC: 6.8 MG/DL (ref 0–1.2)
BLD PROD TYP BPU: NORMAL
BLOOD UNIT EXPIRATION DATE: NORMAL
BLOOD UNIT TYPE CODE: 6200
BUN SERPL-MCNC: 50 MG/DL (ref 7–18)
BUN/CREAT SERPL: 13 (ref 6–20)
CALCIUM SERPL-MCNC: 7.1 MG/DL (ref 8.5–10.1)
CHLORIDE SERPL-SCNC: 100 MMOL/L (ref 98–107)
CO2 SERPL-SCNC: 18 MMOL/L (ref 21–32)
CREAT SERPL-MCNC: 3.84 MG/DL (ref 0.55–1.02)
CROSSMATCH INTERPRETATION: NORMAL
DIFFERENTIAL METHOD BLD: ABNORMAL
DISPENSE STATUS: NORMAL
EOSINOPHIL # BLD AUTO: 0 K/UL (ref 0–0.5)
EOSINOPHIL NFR BLD AUTO: 0 % (ref 1–4)
ERYTHROCYTE [DISTWIDTH] IN BLOOD BY AUTOMATED COUNT: 14.5 % (ref 11.5–14.5)
FSP TITR PPP LA: 237 MG/DL (ref 283–506)
GLOBULIN SER-MCNC: 2.4 G/DL (ref 2–4)
GLUCOSE SERPL-MCNC: 121 MG/DL (ref 74–106)
GLUCOSE SERPL-MCNC: 139 MG/DL (ref 70–105)
HAPTOGLOB SERPL NEPH-MCNC: 35 MG/DL (ref 30–200)
HCT VFR BLD AUTO: 25.7 % (ref 38–47)
HGB BLD-MCNC: 9.2 G/DL (ref 12–16)
IMM GRANULOCYTES # BLD AUTO: 3.49 K/UL (ref 0–0.04)
IMM GRANULOCYTES NFR BLD: 11 % (ref 0–0.4)
INDIRECT COOMBS: NORMAL
INR BLD: 1.51 (ref 0.9–1.1)
INR BLD: 1.51 (ref 0.9–1.1)
LACTATE SERPL-SCNC: 9.1 MMOL/L (ref 0.4–2)
LYMPHOCYTES # BLD AUTO: 1.21 K/UL (ref 1–4.8)
LYMPHOCYTES NFR BLD AUTO: 3.8 % (ref 27–41)
LYMPHOCYTES NFR BLD MANUAL: 8 % (ref 27–41)
MAGNESIUM SERPL-MCNC: 2.2 MG/DL (ref 1.7–2.3)
MCH RBC QN AUTO: 30.6 PG (ref 27–31)
MCHC RBC AUTO-ENTMCNC: 35.8 G/DL (ref 32–36)
MCV RBC AUTO: 85.4 FL (ref 80–96)
METAMYELOCYTES NFR BLD MANUAL: 1 %
MONOCYTES # BLD AUTO: 1.47 K/UL (ref 0–0.8)
MONOCYTES NFR BLD AUTO: 4.7 % (ref 2–6)
MONOCYTES NFR BLD MANUAL: 6 % (ref 2–6)
MPC BLD CALC-MCNC: 10.9 FL (ref 9.4–12.4)
MYELOCYTES NFR BLD MANUAL: 3 %
NEUTROPHILS # BLD AUTO: 25.26 K/UL (ref 1.8–7.7)
NEUTROPHILS NFR BLD AUTO: 80 % (ref 53–65)
NEUTS BAND NFR BLD MANUAL: 5 % (ref 1–5)
NEUTS SEG NFR BLD MANUAL: 77 % (ref 50–62)
NRBC # BLD AUTO: 0.8 X10E3/UL
NRBC BLD MANUAL-RTO: 6 /100 WBC
NRBC, AUTO (.00): 2.5 %
OVALOCYTES BLD QL SMEAR: ABNORMAL
PHOSPHATE SERPL-MCNC: >9 MG/DL (ref 2.5–4.5)
PLATELET # BLD AUTO: 18 K/UL (ref 150–400)
PLATELET MORPHOLOGY: ABNORMAL
POLYCHROMASIA BLD QL SMEAR: ABNORMAL
POTASSIUM SERPL-SCNC: 5.8 MMOL/L (ref 3.5–5.1)
PROT SERPL-MCNC: 4.3 G/DL (ref 6.4–8.2)
PROTHROMBIN TIME: 18 SECONDS (ref 11.7–14.7)
PROTHROMBIN TIME: 18 SECONDS (ref 11.7–14.7)
RBC # BLD AUTO: 3.01 M/UL (ref 4.2–5.4)
RH BLD: NORMAL
SODIUM SERPL-SCNC: 138 MMOL/L (ref 136–145)
TARGETS BLD QL SMEAR: ABNORMAL
TOXIC GRANULES BLD QL SMEAR: ABNORMAL
UNIT NUMBER: NORMAL
WBC # BLD AUTO: 31.59 K/UL (ref 4.5–11)

## 2022-01-08 PROCEDURE — 94003 VENT MGMT INPAT SUBQ DAY: CPT

## 2022-01-08 PROCEDURE — 36430 TRANSFUSION BLD/BLD COMPNT: CPT

## 2022-01-08 PROCEDURE — 94761 N-INVAS EAR/PLS OXIMETRY MLT: CPT

## 2022-01-08 PROCEDURE — 84100 ASSAY OF PHOSPHORUS: CPT | Performed by: INTERNAL MEDICINE

## 2022-01-08 PROCEDURE — 83605 ASSAY OF LACTIC ACID: CPT | Performed by: INTERNAL MEDICINE

## 2022-01-08 PROCEDURE — 63600175 PHARM REV CODE 636 W HCPCS: Performed by: INTERNAL MEDICINE

## 2022-01-08 PROCEDURE — 80053 COMPREHEN METABOLIC PANEL: CPT | Performed by: INTERNAL MEDICINE

## 2022-01-08 PROCEDURE — 36591 DRAW BLOOD OFF VENOUS DEVICE: CPT | Performed by: INTERNAL MEDICINE

## 2022-01-08 PROCEDURE — 83010 ASSAY OF HAPTOGLOBIN QUANT: CPT | Performed by: INTERNAL MEDICINE

## 2022-01-08 PROCEDURE — 25000003 PHARM REV CODE 250: Performed by: INTERNAL MEDICINE

## 2022-01-08 PROCEDURE — 27000221 HC OXYGEN, UP TO 24 HOURS

## 2022-01-08 PROCEDURE — 86901 BLOOD TYPING SEROLOGIC RH(D): CPT | Performed by: INTERNAL MEDICINE

## 2022-01-08 PROCEDURE — 83735 ASSAY OF MAGNESIUM: CPT | Performed by: INTERNAL MEDICINE

## 2022-01-08 PROCEDURE — 85025 COMPLETE CBC W/AUTO DIFF WBC: CPT | Performed by: STUDENT IN AN ORGANIZED HEALTH CARE EDUCATION/TRAINING PROGRAM

## 2022-01-08 PROCEDURE — 82962 GLUCOSE BLOOD TEST: CPT

## 2022-01-08 PROCEDURE — 85730 THROMBOPLASTIN TIME PARTIAL: CPT | Performed by: INTERNAL MEDICINE

## 2022-01-08 PROCEDURE — 85384 FIBRINOGEN ACTIVITY: CPT | Performed by: INTERNAL MEDICINE

## 2022-01-08 PROCEDURE — P9035 PLATELET PHERES LEUKOREDUCED: HCPCS | Performed by: INTERNAL MEDICINE

## 2022-01-08 PROCEDURE — 99900026 HC AIRWAY MAINTENANCE (STAT)

## 2022-01-08 PROCEDURE — 85610 PROTHROMBIN TIME: CPT | Performed by: INTERNAL MEDICINE

## 2022-01-08 RX ORDER — HYDROCODONE BITARTRATE AND ACETAMINOPHEN 500; 5 MG/1; MG/1
TABLET ORAL
Status: DISCONTINUED | OUTPATIENT
Start: 2022-01-08 | End: 2022-01-08 | Stop reason: HOSPADM

## 2022-01-08 RX ADMIN — CALCIUM GLUCONATE 1 G: 98 INJECTION, SOLUTION INTRAVENOUS at 04:01

## 2022-01-08 RX ADMIN — CEFTAROLINE FOSAMIL 200 MG: 600 POWDER, FOR SOLUTION INTRAVENOUS at 04:01

## 2022-01-08 NOTE — NURSING
Went in to assess pt and saw that pt was profusely bleeding from dressing and bp was low, levophed increased, packing and reinforced of dressing were done immediately.  @1911  was called and made aware of situation, order rec'd to give 1 liter NS bolus, transfuse 4 units of PRBC and 4 units of FFP.

## 2022-01-08 NOTE — NURSING
[facility] here x 2 attendants to transport pt to [facility] in [place], pt resting on vent, fentanyl and levophed infusing, dressings was reinforced and intact, NADN, vss stable, all pt belonging sent out with pt, including a cell phone, , and clothing.

## 2022-01-08 NOTE — PLAN OF CARE
Problem: Adult Inpatient Plan of Care  Goal: Plan of Care Review  1/8/2022 0217 by Prerna Sexton RN  Outcome: Ongoing, Progressing  1/8/2022 0216 by Prerna Sexton RN  Outcome: Ongoing, Progressing  Goal: Patient-Specific Goal (Individualized)  1/8/2022 0217 by Prerna Sexton RN  Outcome: Ongoing, Progressing  1/8/2022 0216 by Prerna Sexton RN  Outcome: Ongoing, Progressing  Goal: Absence of Hospital-Acquired Illness or Injury  1/8/2022 0217 by Prerna Sexton RN  Outcome: Ongoing, Progressing  1/8/2022 0216 by Prerna Sexton RN  Outcome: Ongoing, Progressing  Goal: Optimal Comfort and Wellbeing  1/8/2022 0217 by Prerna Sexton RN  Outcome: Ongoing, Progressing  1/8/2022 0216 by Prerna Sexton RN  Outcome: Ongoing, Progressing  Goal: Readiness for Transition of Care  1/8/2022 0217 by Prerna Sexton RN  Outcome: Ongoing, Progressing  1/8/2022 0216 by Prerna Sexton RN  Outcome: Ongoing, Progressing     Problem: Bariatric Environmental Safety  Goal: Safety Maintained with Care  1/8/2022 0217 by Prerna Sexton RN  Outcome: Ongoing, Progressing  1/8/2022 0216 by Prerna Sexton RN  Outcome: Ongoing, Progressing     Problem: Infection  Goal: Absence of Infection Signs and Symptoms  1/8/2022 0217 by Prerna Sexton RN  Outcome: Ongoing, Progressing  1/8/2022 0216 by Prerna Sexton RN  Outcome: Ongoing, Progressing     Problem: Fall Injury Risk  Goal: Absence of Fall and Fall-Related Injury  1/8/2022 0217 by Prerna Sexton RN  Outcome: Ongoing, Progressing  1/8/2022 0216 by Prerna Sexton RN  Outcome: Ongoing, Progressing     Problem: Skin Injury Risk Increased  Goal: Skin Health and Integrity  1/8/2022 0217 by Prerna Sexton RN  Outcome: Ongoing, Progressing  1/8/2022 0216 by Cemetrice L Darshan, RN  Outcome: Ongoing, Progressing     Problem: Fluid and Electrolyte Imbalance (Acute Kidney Injury/Impairment)  Goal: Fluid  and Electrolyte Balance  1/8/2022 0217 by Prerna Sexton RN  Outcome: Ongoing, Progressing  1/8/2022 0216 by Prerna Sexton RN  Outcome: Ongoing, Progressing     Problem: Oral Intake Inadequate (Acute Kidney Injury/Impairment)  Goal: Optimal Nutrition Intake  1/8/2022 0217 by Prerna Sexton RN  Outcome: Ongoing, Progressing  1/8/2022 0216 by Prerna Sexton RN  Outcome: Ongoing, Progressing     Problem: Renal Function Impairment (Acute Kidney Injury/Impairment)  Goal: Effective Renal Function  1/8/2022 0217 by Prerna Sexton RN  Outcome: Ongoing, Progressing  1/8/2022 0216 by Prerna Sexton RN  Outcome: Ongoing, Progressing  Intervention: Monitor and Support Renal Function  Flowsheets (Taken 1/8/2022 0217)  Stabilization Measures:   airway opened   blood products administered  Medication Review/Management: medications reviewed     Problem: Device-Related Complication Risk (Hemodialysis)  Goal: Safe, Effective Therapy Delivery  1/8/2022 0217 by Prerna Sexton RN  Outcome: Ongoing, Progressing  1/8/2022 0216 by Prerna Sexton RN  Outcome: Ongoing, Progressing     Problem: Hemodynamic Instability (Hemodialysis)  Goal: Effective Tissue Perfusion  1/8/2022 0217 by Prerna Sexton RN  Outcome: Ongoing, Progressing  1/8/2022 0216 by Prerna Sexton RN  Outcome: Ongoing, Progressing     Problem: Infection (Hemodialysis)  Goal: Absence of Infection Signs and Symptoms  1/8/2022 0217 by Prerna Sexton RN  Outcome: Ongoing, Progressing  1/8/2022 0216 by Prerna Sexton RN  Outcome: Ongoing, Progressing     Problem: Adjustment to Illness (Sepsis/Septic Shock)  Goal: Optimal Coping  1/8/2022 0217 by Prerna Sexton RN  Outcome: Ongoing, Progressing  1/8/2022 0216 by Prerna Sexton RN  Outcome: Ongoing, Progressing     Problem: Bleeding (Sepsis/Septic Shock)  Goal: Absence of Bleeding  1/8/2022 0217 by Prerna Sexton RN  Outcome: Ongoing,  Progressing  1/8/2022 0216 by Prerna Sexton RN  Outcome: Ongoing, Progressing     Problem: Glycemic Control Impaired (Sepsis/Septic Shock)  Goal: Blood Glucose Level Within Desired Range  1/8/2022 0217 by Prerna Sexton RN  Outcome: Ongoing, Progressing  1/8/2022 0216 by Prerna Sexton RN  Outcome: Ongoing, Progressing  Intervention: Optimize Glycemic Control  Flowsheets (Taken 1/8/2022 0217)  Glycemic Management: blood glucose monitored     Problem: Infection Progression (Sepsis/Septic Shock)  Goal: Absence of Infection Signs and Symptoms  1/8/2022 0217 by Prerna Sexton RN  Outcome: Ongoing, Progressing  1/8/2022 0216 by Prerna Sexton RN  Outcome: Ongoing, Progressing     Problem: Nutrition Impaired (Sepsis/Septic Shock)  Goal: Optimal Nutrition Intake  1/8/2022 0217 by Prerna Sexton RN  Outcome: Ongoing, Progressing  1/8/2022 0216 by Prerna Sexton RN  Outcome: Ongoing, Progressing     Problem: Gas Exchange Impaired  Goal: Optimal Gas Exchange  1/8/2022 0217 by Prerna Sexton RN  Outcome: Ongoing, Progressing  1/8/2022 0216 by Prerna Sexton RN  Outcome: Ongoing, Progressing     Problem: Restraint, Nonbehavioral (Nonviolent)  Goal: Absence of Harm or Injury  1/8/2022 0217 by Prerna Sexton RN  Outcome: Ongoing, Progressing  1/8/2022 0216 by Prerna Sexton RN  Outcome: Ongoing, Progressing     Problem: Communication Impairment (Mechanical Ventilation, Invasive)  Goal: Effective Communication  1/8/2022 0217 by Prerna Sexton RN  Outcome: Ongoing, Progressing  1/8/2022 0216 by Prerna Sexton RN  Outcome: Ongoing, Progressing     Problem: Device-Related Complication Risk (Mechanical Ventilation, Invasive)  Goal: Optimal Device Function  1/8/2022 0217 by Prerna Sexton RN  Outcome: Ongoing, Progressing  1/8/2022 0216 by Prerna Sexton RN  Outcome: Ongoing, Progressing     Problem: Inability to Wean (Mechanical Ventilation,  Invasive)  Goal: Mechanical Ventilation Liberation  1/8/2022 0217 by Prerna Sexton RN  Outcome: Ongoing, Progressing  1/8/2022 0216 by Prerna Sexton RN  Outcome: Ongoing, Progressing     Problem: Nutrition Impairment (Mechanical Ventilation, Invasive)  Goal: Optimal Nutrition Delivery  1/8/2022 0217 by Prerna Sexton RN  Outcome: Ongoing, Progressing  1/8/2022 0216 by Prerna Sexton RN  Outcome: Ongoing, Progressing     Problem: Skin and Tissue Injury (Mechanical Ventilation, Invasive)  Goal: Absence of Device-Related Skin and Tissue Injury  1/8/2022 0217 by Prerna Sexton RN  Outcome: Ongoing, Progressing  1/8/2022 0216 by Prerna Sexton RN  Outcome: Ongoing, Progressing  Intervention: Maintain Skin and Tissue Health  Flowsheets (Taken 1/8/2022 0217)  Device Skin Pressure Protection: positioning supports utilized     Problem: Ventilator-Induced Lung Injury (Mechanical Ventilation, Invasive)  Goal: Absence of Ventilator-Induced Lung Injury  1/8/2022 0217 by Prerna Sexton RN  Outcome: Ongoing, Progressing  1/8/2022 0216 by Prerna Sexton RN  Outcome: Ongoing, Progressing     Problem: Communication Impairment (Artificial Airway)  Goal: Effective Communication  1/8/2022 0217 by Prerna Sexton RN  Outcome: Ongoing, Progressing  1/8/2022 0216 by Prerna Sexton RN  Outcome: Ongoing, Progressing     Problem: Device-Related Complication Risk (Artificial Airway)  Goal: Optimal Device Function  1/8/2022 0217 by Prerna Sexton RN  Outcome: Ongoing, Progressing  1/8/2022 0216 by Prerna Sexton RN  Outcome: Ongoing, Progressing  Intervention: Optimize Device Care and Function  Flowsheets (Taken 1/8/2022 0217)  Airway/Ventilation Management: airway patency maintained  Airway Safety Measures: suction at bedside  Oral Care:   swabbed with antiseptic solution   suction provided     Problem: Skin and Tissue Injury (Artificial Airway)  Goal: Absence of  Device-Related Skin or Tissue Injury  1/8/2022 0217 by Prerna Sexton RN  Outcome: Ongoing, Progressing  1/8/2022 0216 by Prerna Sexton RN  Outcome: Ongoing, Progressing     Problem: Noninvasive Ventilation Acute  Goal: Effective Unassisted Ventilation and Oxygenation  1/8/2022 0217 by Prerna Sexton RN  Outcome: Ongoing, Progressing  1/8/2022 0216 by Prerna Sexton RN  Outcome: Ongoing, Progressing

## 2022-01-08 NOTE — NURSING
@ 0225 rec'd call from lab ([facility]) about abnormal lab values of WBC 31.59 and PLT 18.  @Notified Stormy Aguilar via secure chat regarding abnormal lab values.

## 2022-01-09 LAB — MICROORGANISM SPEC CULT: NORMAL

## 2022-01-10 LAB
BACTERIA BLD CULT: NORMAL
BACTERIA BLD CULT: NORMAL
BACTERIA SPEC ANAEROBE CULT: NORMAL
ESTROGEN SERPL-MCNC: NORMAL PG/ML
LAB AP GROSS DESCRIPTION: NORMAL
LAB AP LABORATORY NOTES: NORMAL
T3RU NFR SERPL: NORMAL %

## 2022-01-10 NOTE — ANESTHESIA POSTPROCEDURE EVALUATION
Anesthesia Post Evaluation    Patient: Flor Kelley    Procedure(s) Performed: Procedure(s) (LRB):  IRRIGATION AND DEBRIDEMENTof abdomen, bilateral groin left hip area (N/A)    Final Anesthesia Type: general      Patient location during evaluation: PACU  Patient participation: Yes- Able to Participate  Level of consciousness: awake and sedated  Post-procedure vital signs: reviewed and stable  Pain management: adequate  Airway patency: patent    PONV status at discharge: No PONV  Anesthetic complications: no      Cardiovascular status: blood pressure returned to baseline  Respiratory status: unassisted  Hydration status: euvolemic  Follow-up not needed.          Vitals Value Taken Time   /70 01/08/22 0545   Temp 37.3 °C (99.2 °F) 01/08/22 0315   Pulse 107 01/08/22 0600   Resp 20 01/08/22 0600   SpO2 98 % 01/08/22 0555   Vitals shown include unvalidated device data.      No case tracking events are documented in the log.      Pain/Angel Luis Score: No data recorded

## 2023-12-13 NOTE — ANESTHESIA PROCEDURE NOTES
Intubation  Performed by: Cayetano Little CRNA  Authorized by: Matt Brewer MD     Intubation:     Induction:  Intravenous    Intubated:  Postinduction    Mask Ventilation:  Easy mask    Attempts:  1    Attempted By:  CRNA    Difficult Airway Encountered?: No      Complications:  None    Airway Device:  Supraglottic airway/LMA    Airway Device Size:  4.0    Style/Cuff Inflation:  Cuffed (inflated to minimal occlusive pressure)    Placement Verified By:  Capnometry    Complicating Factors:  None    Findings Post-Intubation:  BS equal bilateral         Spoke with patient gave orders verbalized understanding.

## 2024-06-07 NOTE — H&P
"Coronary artery bypass grafting x 4 (LIMA/LAD, RSVG/dRCA, RSVG/OM, and RSVG/D), right leg endoscopic vein harvest, sternalock XP sternal plating, application of Prevena incision management system      POD 1    Extubated overnight.  Currently up in the chair.  On 3 L/min nasal cannula.  Reaching 500 mL on incentive spirometer.  Reports good pain control, 2 out of 10.  Vera catheter remains in place,  gross hematuria has resolved.  No family present.    IV drips: Amiodarone, magnesium  Telemetry: Sinus 80s  Hemodynamics reviewed, stable    Visit Vitals  /79   Pulse 88   Temp 98.8 °F (37.1 °C) (Oral)   Resp 17   Ht 195.6 cm (77\")   Wt (!) 203 kg (446 lb 9.6 oz)   SpO2 95%   BMI 52.96 kg/m²   Preoperative weight: 437 pounds    Intake/Output Summary (Last 24 hours) at 6/7/2024 0904  Last data filed at 6/7/2024 0527  Gross per 24 hour   Intake 5534.18 ml   Output 3253 ml   Net 2281.18 ml     Mediastinal tube output: 158 mL / 24 hours  Left Kam drain: 85 mL / 24 hours  Serosanguineous    Labs:        Chest x-ray: Extubated, appropriate support lines in place, hypoventilation, no pneumothorax, bilateral atelectasis    Physical Exam:  General: No apparent distress. In good spirits.  Up in the chair.  Cardiovascular: Regular rate and rhythm without murmur, rubs, or gallops.    Pulmonary: Clear to auscultation bilaterally without wheezing, rubs, or rales.  Chest: Sternotomy incision clean, dry, and intact. Sternum stable. No clicks. Mediastinal tubes x 2 and Kam drain x 1 with dressing clean, dry, and intact.    Abdomen: Soft, nondistended and nontender.  Extremities: Warm, moves all extremities. Saphenectomy site clean, dry, and intact.   : Vera catheter in place, urine clear yellow.  Neurologic: Grossly intact with no focal deficits.       Impression:  Non-STEMI  Coronary artery disease with unstable angina  Class III severe obesity, BMI 52.96  Hypertension  Prediabetes  Obstructive sleep apnea  Gross hematuria " [address] [facility]  History & Physical    Patient Name: Flor Kelley  MRN: [id number]  Patient Class: IP- Inpatient  Admission Date: 12/31/2021  Attending Physician: Jairo Anne MD   Primary Care Provider: Primary Doctor No         Patient information was obtained from patient, past medical records and ER records.     Subjective:     Principal Problem:Cellulitis    Chief Complaint:   Chief Complaint   Patient presents with    Groin Swelling    Groin Pain        HPI: 42 yo F presents to the ED with worsening LLE pain and swelling.  She states that she has not had any trauma to LLE but has noticed redness and swelling worsening over the past several days.  She has not had F/C but states that area has become warm and tender.  CT done in ED showed small abscess only.  Lactic acid 3.2 but repeat 1.8.  She has elevated BP and says she has not been able to afford any meds and that she has not seen a PCP since her diagnosis of HTN.  Her creat is 2.8 and is most likely chronic.  She has marked pronteinuria.      Patient has had a dry cough and has lost her appetitie in the last two days.  She has not had COVID vaccinations and she did attend a [employer] party recently and she is now COVID positive.  She is not hypoxic or SOB at present.  She is currently on her menstrual cycle and urine pregnancy is negative.        Past Medical History:   Diagnosis Date    Hypertension        Past Surgical History:   Procedure Laterality Date    NO PAST SURGERIES         Review of patient's allergies indicates:  No Known Allergies    No current facility-administered medications on file prior to encounter.     No current outpatient medications on file prior to encounter.     Family History     Problem Relation (Age of Onset)    Hypertension Mother        Tobacco Use    Smoking status: Current Some Day Smoker    Smokeless tobacco: Never Used   Substance and Sexual Activity    Alcohol use:  Not Currently    Drug use: Never    Sexual activity: Not Currently     Review of Systems   Constitutional: Negative for appetite change, chills, fatigue, fever and unexpected weight change.   HENT: Negative for congestion, mouth sores, nosebleeds, sinus pain, sore throat and trouble swallowing.    Eyes: Negative for visual disturbance.   Respiratory: Positive for cough. Negative for apnea, chest tightness and shortness of breath.    Cardiovascular: Negative for chest pain, palpitations and leg swelling.   Gastrointestinal: Negative for abdominal pain, blood in stool, constipation, diarrhea, nausea and vomiting.   Endocrine: Negative for polydipsia and polyuria.   Genitourinary: Negative for decreased urine volume, difficulty urinating, dysuria, frequency and hematuria.   Musculoskeletal: Negative for arthralgias, back pain and neck pain.   Skin: Negative for rash.   Neurological: Negative for syncope, light-headedness and headaches.   Hematological: Does not bruise/bleed easily.   Psychiatric/Behavioral: Negative for confusion and suicidal ideas.     Objective:     Vital Signs (Most Recent):  Temp: 98.5 °F (36.9 °C) (01/01/22 0030)  Pulse: (!) 119 (01/01/22 0030)  Resp: 20 (01/01/22 0138)  BP: (!) 146/107 (01/01/22 0030)  SpO2: 97 % (01/01/22 0030) Vital Signs (24h Range):  Temp:  [98.5 °F (36.9 °C)-99.1 °F (37.3 °C)] 98.5 °F (36.9 °C)  Pulse:  [119-130] 119  Resp:  [20] 20  SpO2:  [96 %-99 %] 97 %  BP: (146-180)/(107-118) 146/107     Weight: 82.4 kg (181 lb 9.6 oz)  Body mass index is 35.47 kg/m².    Physical Exam  Vitals and nursing note reviewed. Exam conducted with a chaperone present.   Constitutional:       Appearance: She is obese. She is ill-appearing.   HENT:      Head: Atraumatic.      Mouth/Throat:      Mouth: Mucous membranes are moist.      Pharynx: Oropharynx is clear.   Eyes:      Conjunctiva/sclera: Conjunctivae normal.      Pupils: Pupils are equal, round, and reactive to light.   Cardiovascular:  with history of hematuria    Plan:   Will check urine and serum myoglobin  Keep Vera catheter, will hold Plavix.  Okay for aspirin and Lovenox 40 mg daily VTE prophylaxis-discussed with Dr. Beaulieu.  Encourage pulmonary toilet and ambulation  Routine postcardiac surgery regimen  Discussed with patient and nursing  Keep in unit for now, will assess for transfer to  later today        Rate and Rhythm: Normal rate and regular rhythm.      Pulses: Normal pulses.      Heart sounds: Normal heart sounds.   Pulmonary:      Effort: Pulmonary effort is normal.      Breath sounds: Normal breath sounds.   Abdominal:      General: Abdomen is flat. Bowel sounds are normal.      Palpations: Abdomen is soft.   Musculoskeletal:         General: Normal range of motion.      Cervical back: Neck supple.   Skin:     General: Skin is warm and dry.      Capillary Refill: Capillary refill takes 2 to 3 seconds.      Coloration: Skin is not jaundiced or pale.      Findings: Erythema present. No bruising, lesion or rash.   Patient with erythema and tenderness in groin that extends to lower abdominal wall into vulva as well as into inner thigh.  No fluctuance.    Neurological:      General: No focal deficit present.      Mental Status: She is alert and oriented to person, place, and time.   Psychiatric:         Mood and Affect: Mood normal.           CRANIAL NERVES     CN III, IV, VI   Pupils are equal, round, and reactive to light.       Significant Labs: All pertinent labs within the past 24 hours have been reviewed.    Significant Imaging: I have reviewed all pertinent imaging results/findings within the past 24 hours.    Assessment/Plan:     * Cellulitis  Continue IV zosyn and IV vancomycin  Follow blood cultures  Appreciate surgical recommendations        Hypertension  Will start BB.  Patient has not sees PCP since diagnosis  Renal failure most likely chronic due to HTN.  Will need to follow up as OP.        COVID-19  Steriods, anticoagulation and H2 blockers  Monitor for oxygen requirement  Recommend monoclonal antibodies if can be given IP.          VTE Risk Mitigation (From admission, onward)         Ordered     apixaban tablet 5 mg  2 times daily         01/01/22 0110                   Flor Mccain MD  Department of Hospital Medicine   [address]

## (undated) DEVICE — GLOVE SURGICAL PROTEXIS PI SIZE 7.5

## (undated) DEVICE — COVER PROBE 6X96 IN STERILE CS/20

## (undated) DEVICE — CURETTE DERMAL DISP 5MM

## (undated) DEVICE — SUTURE SILK 2-0 FS 18 BLACK

## (undated) DEVICE — SPONGE LAP STRL 18X18 5/PK

## (undated) DEVICE — APPLICATOR CHLORAPREP HI-LITE TINTED ORANGE 26ML

## (undated) DEVICE — TUBING SUCTION 5MM STERILE 3/16IN X 12FT

## (undated) DEVICE — SUTURE ETHIBOND 0 18 GREEN CT1

## (undated) DEVICE — GLOVE SURGICAL PROTEXIS PI BLUE SIZE 7.0

## (undated) DEVICE — GLOVE SURGICAL PROTEXIS PI SIZE 6.5

## (undated) DEVICE — BANDAGE KERLIX AMD  4.5IN  SPONGE ROLL

## (undated) DEVICE — MARKER SURGICAL PEN & RULER STERILE

## (undated) DEVICE — DRESSING TELFA NONADH 3X4 STL AMD

## (undated) DEVICE — GLOVE SURGICAL PROTEXIS PI BLUE SIZE 6.5

## (undated) DEVICE — SUTURE VICRYL 3-0  SH UNDYED 18IN

## (undated) DEVICE — GOWN SURGICAL SMARTGOWN LEVEL 4 / EXTRA LARGE STERILE

## (undated) DEVICE — TOWEL OR STERILE BLUE 4/PK 20PK/CS

## (undated) DEVICE — COUNTER SHARPS FOAM BLOCK MAGNET 40-70

## (undated) DEVICE — TRAY SKIN PREP PROVIDONE IODINE STERILE LATEX FREE

## (undated) DEVICE — Device

## (undated) DEVICE — SUTURE VICRYL 2-0 UNDYED CT-1

## (undated) DEVICE — SUTURE PROLENE 6-0 BV1 DA 30IN

## (undated) DEVICE — DRAPE SURGICAL 3/4 SHEET 56 X 77" STERILE"

## (undated) DEVICE — COUNTER SHARPS FOAM BLOCK MAGNET 20-40

## (undated) DEVICE — LOOP MAXI RED SILICONE PK/2

## (undated) DEVICE — SUTURE PROLENE 5-0 BLU C-1 36IN

## (undated) DEVICE — BLADE CARBON SURG SS SZ 15 STERILE

## (undated) DEVICE — FILM IOBAN ANTIMICROBL 60X60CM

## (undated) DEVICE — COVER LIGHT HANDLE FLEX PK/2

## (undated) DEVICE — PACK TIBURON UNIVERSAL

## (undated) DEVICE — SYRINGE 10-12CC LURE -LOK TIP

## (undated) DEVICE — SUTURE VICRYL 3-0 SH UNDYED 27IN

## (undated) DEVICE — GOWN SURGICAL XXLG

## (undated) DEVICE — GOWN SURGICAL STERILE LEVEL 3 / XX-LARGE

## (undated) DEVICE — CULTURETTE AEROBIC SWAB

## (undated) DEVICE — TRAY VAGINAL SKIN PREP DRY

## (undated) DEVICE — DRESSING SPONGE GAUZE STL 4X4

## (undated) DEVICE — STRIP WOUND CLOSURE 1/4 IN X 4 IN

## (undated) DEVICE — SPONGE GAUZE 4X4 12 PLY STL AMD 10/TRAY

## (undated) DEVICE — HANDLE YANKAUER SUCTION K80 LATEX FREE

## (undated) DEVICE — KIT CATH HEMODIALYSIS GLIDEPATH 19CM STR.

## (undated) DEVICE — SOL IRRIGATION SALINE 0.9% 1000ML BOTTLE

## (undated) DEVICE — CULTURETTE ANAEROBIC COLLECTOR

## (undated) DEVICE — BAG-A-JET FLUID DISPENSER SYSTEM

## (undated) DEVICE — GLOVE SURGICAL PROTEXIS PI SIZE 7

## (undated) DEVICE — DRESSING TELFA MINI ISLAND LF NONADH

## (undated) DEVICE — GLOVE SURGICAL PROTEXIS PI BLUE SIZE 8.0

## (undated) DEVICE — CLIP APPLIER LIGATION MED/LNG

## (undated) DEVICE — SYRINGE ASEPTO IRRIGATION BULB 60CC LF DISP

## (undated) DEVICE — DRAPE FLUORO C ARM 36X30IN

## (undated) DEVICE — PENCIL HANDSWITCHING ROCKER SW

## (undated) DEVICE — DRESSING ABD SURGIPAD 8X7.5IN

## (undated) DEVICE — CDS LITHOTOMY

## (undated) DEVICE — BLADE BOVIE INSULATED COATED 2.75IN

## (undated) DEVICE — BAG DRAIN UROLOGY ANTI REFLUX 2000ML LF

## (undated) DEVICE — GLOVE SURGICAL PROTEXIS PI SIZE 8.0

## (undated) DEVICE — SYRINGE 30CC LL

## (undated) DEVICE — CATHETER FOLEY 16FR SILVER 5CC